# Patient Record
Sex: FEMALE | Race: WHITE | NOT HISPANIC OR LATINO | Employment: FULL TIME | URBAN - METROPOLITAN AREA
[De-identification: names, ages, dates, MRNs, and addresses within clinical notes are randomized per-mention and may not be internally consistent; named-entity substitution may affect disease eponyms.]

---

## 2018-06-21 ENCOUNTER — OFFICE VISIT CONVERTED (OUTPATIENT)
Dept: ONCOLOGY | Facility: HOSPITAL | Age: 33
End: 2018-06-21
Attending: NURSE PRACTITIONER

## 2018-07-03 ENCOUNTER — CONVERSION ENCOUNTER (OUTPATIENT)
Dept: GENERAL RADIOLOGY | Facility: HOSPITAL | Age: 33
End: 2018-07-03

## 2018-08-22 ENCOUNTER — OFFICE VISIT CONVERTED (OUTPATIENT)
Dept: GASTROENTEROLOGY | Facility: CLINIC | Age: 33
End: 2018-08-22
Attending: PHYSICIAN ASSISTANT

## 2018-08-22 ENCOUNTER — CONVERSION ENCOUNTER (OUTPATIENT)
Dept: GASTROENTEROLOGY | Facility: CLINIC | Age: 33
End: 2018-08-22

## 2018-10-29 ENCOUNTER — OFFICE VISIT CONVERTED (OUTPATIENT)
Dept: ONCOLOGY | Facility: HOSPITAL | Age: 33
End: 2018-10-29
Attending: INTERNAL MEDICINE

## 2019-02-06 ENCOUNTER — OFFICE VISIT CONVERTED (OUTPATIENT)
Dept: ONCOLOGY | Facility: HOSPITAL | Age: 34
End: 2019-02-06
Attending: INTERNAL MEDICINE

## 2019-02-06 ENCOUNTER — HOSPITAL ENCOUNTER (OUTPATIENT)
Dept: ONCOLOGY | Facility: HOSPITAL | Age: 34
Discharge: HOME | End: 2019-02-06
Attending: INTERNAL MEDICINE

## 2019-04-23 ENCOUNTER — HOSPITAL ENCOUNTER (OUTPATIENT)
Dept: OTHER | Facility: HOSPITAL | Age: 34
Discharge: HOME | End: 2019-04-23
Attending: INTERNAL MEDICINE

## 2019-04-23 LAB
ALBUMIN SERPL-MCNC: 4.4 G/DL (ref 3.5–5)
ALBUMIN/GLOB SERPL: 1.9 {RATIO} (ref 1.4–2.6)
ALP SERPL-CCNC: 48 U/L (ref 42–98)
ALT SERPL-CCNC: 14 U/L (ref 10–40)
ANION GAP SERPL CALC-SCNC: 15 MMOL/L (ref 8–19)
AST SERPL-CCNC: 13 U/L (ref 15–50)
BASOPHILS # BLD AUTO: 0.02 10*3/UL (ref 0–0.2)
BASOPHILS NFR BLD AUTO: 0.6 % (ref 0–3)
BILIRUB SERPL-MCNC: 0.5 MG/DL (ref 0.2–1.3)
BUN SERPL-MCNC: 8 MG/DL (ref 5–25)
BUN/CREAT SERPL: 10 {RATIO} (ref 6–20)
CALCIUM SERPL-MCNC: 8.6 MG/DL (ref 8.7–10.4)
CHLORIDE SERPL-SCNC: 104 MMOL/L (ref 99–111)
CONV ABS IMM GRAN: 0 10*3/UL (ref 0–0.2)
CONV CO2: 25 MMOL/L (ref 22–32)
CONV IMMATURE GRAN: 0 % (ref 0–1.8)
CONV TOTAL PROTEIN: 6.7 G/DL (ref 6.3–8.2)
CREAT UR-MCNC: 0.84 MG/DL (ref 0.5–0.9)
DEPRECATED RDW RBC AUTO: 45.9 FL (ref 36.4–46.3)
EOSINOPHIL # BLD AUTO: 0.05 10*3/UL (ref 0–0.7)
EOSINOPHIL # BLD AUTO: 1.6 % (ref 0–7)
ERYTHROCYTE [DISTWIDTH] IN BLOOD BY AUTOMATED COUNT: 12.8 % (ref 11.7–14.4)
GFR SERPLBLD BASED ON 1.73 SQ M-ARVRAT: >60 ML/MIN/{1.73_M2}
GLOBULIN UR ELPH-MCNC: 2.3 G/DL (ref 2–3.5)
GLUCOSE SERPL-MCNC: 98 MG/DL (ref 65–99)
HBA1C MFR BLD: 13.4 G/DL (ref 12–16)
HCT VFR BLD AUTO: 39.7 % (ref 37–47)
LDH SERPL-CCNC: 189 U/L (ref 120–240)
LYMPHOCYTES # BLD AUTO: 1.32 10*3/UL (ref 1–5)
MCH RBC QN AUTO: 33 PG (ref 27–31)
MCHC RBC AUTO-ENTMCNC: 33.8 G/DL (ref 33–37)
MCV RBC AUTO: 97.8 FL (ref 81–99)
MONOCYTES # BLD AUTO: 0.16 10*3/UL (ref 0.2–1.2)
MONOCYTES NFR BLD AUTO: 5.2 % (ref 3–10)
NEUTROPHILS # BLD AUTO: 1.55 10*3/UL (ref 2–8)
NEUTROPHILS NFR BLD AUTO: 50 % (ref 30–85)
NRBC CBCN: 0 % (ref 0–0.7)
OSMOLALITY SERPL CALC.SUM OF ELEC: 288 MOSM/KG (ref 273–304)
PLATELET # BLD AUTO: 217 10*3/UL (ref 130–400)
PMV BLD AUTO: 9.7 FL (ref 9.4–12.3)
POTASSIUM SERPL-SCNC: 3.8 MMOL/L (ref 3.5–5.3)
RBC # BLD AUTO: 4.06 10*6/UL (ref 4.2–5.4)
SODIUM SERPL-SCNC: 140 MMOL/L (ref 135–147)
VARIANT LYMPHS NFR BLD MANUAL: 42.6 % (ref 20–45)
WBC # BLD AUTO: 3.1 10*3/UL (ref 4.8–10.8)

## 2019-04-30 LAB
CONV BCR-ABL1  SOURCE: NORMAL
CONV BCR-ABL1 T(9;22) QUAL BY RT-PCR: NOT DETECTED

## 2019-05-01 ENCOUNTER — HOSPITAL ENCOUNTER (OUTPATIENT)
Dept: ONCOLOGY | Facility: HOSPITAL | Age: 34
Discharge: HOME | End: 2019-05-01
Attending: INTERNAL MEDICINE

## 2019-05-01 ENCOUNTER — OFFICE VISIT CONVERTED (OUTPATIENT)
Dept: ONCOLOGY | Facility: HOSPITAL | Age: 34
End: 2019-05-01
Attending: INTERNAL MEDICINE

## 2019-07-26 ENCOUNTER — HOSPITAL ENCOUNTER (OUTPATIENT)
Dept: OTHER | Facility: HOSPITAL | Age: 34
Discharge: HOME | End: 2019-07-26
Attending: NURSE PRACTITIONER

## 2019-07-26 LAB
ALBUMIN SERPL-MCNC: 4.6 G/DL (ref 3.5–5)
ALBUMIN/GLOB SERPL: 2.1 {RATIO} (ref 1.4–2.6)
ALP SERPL-CCNC: 50 U/L (ref 42–98)
ALT SERPL-CCNC: 12 U/L (ref 10–40)
ANION GAP SERPL CALC-SCNC: 15 MMOL/L (ref 8–19)
AST SERPL-CCNC: 12 U/L (ref 15–50)
BASOPHILS # BLD AUTO: 0.02 10*3/UL (ref 0–0.2)
BASOPHILS NFR BLD AUTO: 0.4 % (ref 0–3)
BILIRUB SERPL-MCNC: 0.69 MG/DL (ref 0.2–1.3)
BUN SERPL-MCNC: 9 MG/DL (ref 5–25)
BUN/CREAT SERPL: 12 {RATIO} (ref 6–20)
CALCIUM SERPL-MCNC: 9.1 MG/DL (ref 8.7–10.4)
CHLORIDE SERPL-SCNC: 105 MMOL/L (ref 99–111)
CONV ABS IMM GRAN: 0.01 10*3/UL (ref 0–0.2)
CONV CO2: 25 MMOL/L (ref 22–32)
CONV IMMATURE GRAN: 0.2 % (ref 0–1.8)
CONV TOTAL PROTEIN: 6.8 G/DL (ref 6.3–8.2)
CREAT UR-MCNC: 0.78 MG/DL (ref 0.5–0.9)
DEPRECATED RDW RBC AUTO: 50.2 FL (ref 36.4–46.3)
EOSINOPHIL # BLD AUTO: 0.04 10*3/UL (ref 0–0.7)
EOSINOPHIL # BLD AUTO: 0.8 % (ref 0–7)
ERYTHROCYTE [DISTWIDTH] IN BLOOD BY AUTOMATED COUNT: 13.5 % (ref 11.7–14.4)
GFR SERPLBLD BASED ON 1.73 SQ M-ARVRAT: >60 ML/MIN/{1.73_M2}
GLOBULIN UR ELPH-MCNC: 2.2 G/DL (ref 2–3.5)
GLUCOSE SERPL-MCNC: 99 MG/DL (ref 65–99)
HBA1C MFR BLD: 13.8 G/DL (ref 12–16)
HCT VFR BLD AUTO: 41.2 % (ref 37–47)
LDH SERPL-CCNC: 202 U/L (ref 120–240)
LYMPHOCYTES # BLD AUTO: 1.51 10*3/UL (ref 1–5)
MCH RBC QN AUTO: 33.8 PG (ref 27–31)
MCHC RBC AUTO-ENTMCNC: 33.5 G/DL (ref 33–37)
MCV RBC AUTO: 101 FL (ref 81–99)
MONOCYTES # BLD AUTO: 0.33 10*3/UL (ref 0.2–1.2)
MONOCYTES NFR BLD AUTO: 6.9 % (ref 3–10)
NEUTROPHILS # BLD AUTO: 2.86 10*3/UL (ref 2–8)
NEUTROPHILS NFR BLD AUTO: 60 % (ref 30–85)
NRBC CBCN: 0 % (ref 0–0.7)
OSMOLALITY SERPL CALC.SUM OF ELEC: 291 MOSM/KG (ref 273–304)
PLATELET # BLD AUTO: 224 10*3/UL (ref 130–400)
PMV BLD AUTO: 9.5 FL (ref 9.4–12.3)
POTASSIUM SERPL-SCNC: 4.3 MMOL/L (ref 3.5–5.3)
RBC # BLD AUTO: 4.08 10*6/UL (ref 4.2–5.4)
SODIUM SERPL-SCNC: 141 MMOL/L (ref 135–147)
VARIANT LYMPHS NFR BLD MANUAL: 31.7 % (ref 20–45)
WBC # BLD AUTO: 4.77 10*3/UL (ref 4.8–10.8)

## 2019-08-06 LAB
CONV BCR-ABL1  SOURCE: ABNORMAL
CONV BCR-ABL1  SOURCE: NORMAL
CONV BCR-ABL1 T(9;22) QUAL BY RT-PCR: ABNORMAL
CONV BCR-ABL1, INTERNATIONAL SCALE (PERCENT): 0.07 %
CONV EER BCR-ABL1, MAJOR (P210): NORMAL
P210 BCR-ABL: DETECTED

## 2019-08-07 ENCOUNTER — HOSPITAL ENCOUNTER (OUTPATIENT)
Dept: ONCOLOGY | Facility: HOSPITAL | Age: 34
Discharge: HOME | End: 2019-08-07
Attending: INTERNAL MEDICINE

## 2019-08-07 ENCOUNTER — OFFICE VISIT CONVERTED (OUTPATIENT)
Dept: ONCOLOGY | Facility: HOSPITAL | Age: 34
End: 2019-08-07
Attending: INTERNAL MEDICINE

## 2019-11-19 ENCOUNTER — HOSPITAL ENCOUNTER (OUTPATIENT)
Dept: OTHER | Facility: HOSPITAL | Age: 34
Discharge: HOME | End: 2019-11-19
Attending: NURSE PRACTITIONER

## 2019-11-19 LAB
ALBUMIN SERPL-MCNC: 4.6 G/DL (ref 3.5–5)
ALBUMIN/GLOB SERPL: 1.8 {RATIO} (ref 1.4–2.6)
ALP SERPL-CCNC: 56 U/L (ref 42–98)
ALT SERPL-CCNC: 13 U/L (ref 10–40)
ANION GAP SERPL CALC-SCNC: 21 MMOL/L (ref 8–19)
AST SERPL-CCNC: 13 U/L (ref 15–50)
BASOPHILS # BLD AUTO: 0.02 10*3/UL (ref 0–0.2)
BASOPHILS NFR BLD AUTO: 0.5 % (ref 0–3)
BILIRUB SERPL-MCNC: 0.46 MG/DL (ref 0.2–1.3)
BUN SERPL-MCNC: 9 MG/DL (ref 5–25)
BUN/CREAT SERPL: 11 {RATIO} (ref 6–20)
CALCIUM SERPL-MCNC: 9.3 MG/DL (ref 8.7–10.4)
CHLORIDE SERPL-SCNC: 102 MMOL/L (ref 99–111)
CONV ABS IMM GRAN: 0 10*3/UL (ref 0–0.2)
CONV CO2: 22 MMOL/L (ref 22–32)
CONV IMMATURE GRAN: 0 % (ref 0–1.8)
CONV TOTAL PROTEIN: 7.1 G/DL (ref 6.3–8.2)
CREAT UR-MCNC: 0.84 MG/DL (ref 0.5–0.9)
DEPRECATED RDW RBC AUTO: 47 FL (ref 36.4–46.3)
EOSINOPHIL # BLD AUTO: 0.03 10*3/UL (ref 0–0.7)
EOSINOPHIL # BLD AUTO: 0.8 % (ref 0–7)
ERYTHROCYTE [DISTWIDTH] IN BLOOD BY AUTOMATED COUNT: 12.9 % (ref 11.7–14.4)
GFR SERPLBLD BASED ON 1.73 SQ M-ARVRAT: >60 ML/MIN/{1.73_M2}
GLOBULIN UR ELPH-MCNC: 2.5 G/DL (ref 2–3.5)
GLUCOSE SERPL-MCNC: 97 MG/DL (ref 65–99)
HCT VFR BLD AUTO: 42.5 % (ref 37–47)
HGB BLD-MCNC: 13.8 G/DL (ref 12–16)
LYMPHOCYTES # BLD AUTO: 1.54 10*3/UL (ref 1–5)
LYMPHOCYTES NFR BLD AUTO: 39.2 % (ref 20–45)
MCH RBC QN AUTO: 32.4 PG (ref 27–31)
MCHC RBC AUTO-ENTMCNC: 32.5 G/DL (ref 33–37)
MCV RBC AUTO: 99.8 FL (ref 81–99)
MONOCYTES # BLD AUTO: 0.33 10*3/UL (ref 0.2–1.2)
MONOCYTES NFR BLD AUTO: 8.4 % (ref 3–10)
NEUTROPHILS # BLD AUTO: 2.01 10*3/UL (ref 2–8)
NEUTROPHILS NFR BLD AUTO: 51.1 % (ref 30–85)
NRBC CBCN: 0 % (ref 0–0.7)
OSMOLALITY SERPL CALC.SUM OF ELEC: 291 MOSM/KG (ref 273–304)
PLATELET # BLD AUTO: 241 10*3/UL (ref 130–400)
PMV BLD AUTO: 9.5 FL (ref 9.4–12.3)
POTASSIUM SERPL-SCNC: 4 MMOL/L (ref 3.5–5.3)
RBC # BLD AUTO: 4.26 10*6/UL (ref 4.2–5.4)
SODIUM SERPL-SCNC: 141 MMOL/L (ref 135–147)
WBC # BLD AUTO: 3.93 10*3/UL (ref 4.8–10.8)

## 2019-11-24 LAB
CONV BCR-ABL1  SOURCE: ABNORMAL
CONV BCR-ABL1 T(9;22) QUAL BY RT-PCR: ABNORMAL

## 2019-11-26 LAB
CONV BCR-ABL1  SOURCE: NORMAL
CONV BCR-ABL1, INTERNATIONAL SCALE (PERCENT): 0.03 %
CONV EER BCR-ABL1, MAJOR (P210): NORMAL
P210 BCR-ABL: DETECTED

## 2019-12-31 ENCOUNTER — OFFICE VISIT CONVERTED (OUTPATIENT)
Dept: ONCOLOGY | Facility: HOSPITAL | Age: 34
End: 2019-12-31
Attending: INTERNAL MEDICINE

## 2019-12-31 ENCOUNTER — HOSPITAL ENCOUNTER (OUTPATIENT)
Dept: ONCOLOGY | Facility: HOSPITAL | Age: 34
Discharge: HOME | End: 2019-12-31
Attending: INTERNAL MEDICINE

## 2020-04-27 ENCOUNTER — HOSPITAL ENCOUNTER (OUTPATIENT)
Dept: URGENT CARE | Facility: CLINIC | Age: 35
Discharge: HOME | End: 2020-04-27

## 2020-06-24 ENCOUNTER — HOSPITAL ENCOUNTER (OUTPATIENT)
Dept: OTHER | Facility: HOSPITAL | Age: 35
Discharge: HOME | End: 2020-06-24
Attending: INTERNAL MEDICINE

## 2020-06-24 LAB
ALBUMIN SERPL-MCNC: 4.5 G/DL (ref 3.5–5)
ALBUMIN/GLOB SERPL: 2.3 {RATIO} (ref 1.4–2.6)
ALP SERPL-CCNC: 45 U/L (ref 42–98)
ALT SERPL-CCNC: 10 U/L (ref 10–40)
ANION GAP SERPL CALC-SCNC: 15 MMOL/L (ref 8–19)
AST SERPL-CCNC: 13 U/L (ref 15–50)
BASOPHILS # BLD AUTO: 0.03 10*3/UL (ref 0–0.2)
BASOPHILS NFR BLD AUTO: 0.8 % (ref 0–3)
BILIRUB SERPL-MCNC: 0.49 MG/DL (ref 0.2–1.3)
BUN SERPL-MCNC: 7 MG/DL (ref 5–25)
BUN/CREAT SERPL: 9 {RATIO} (ref 6–20)
CALCIUM SERPL-MCNC: 9 MG/DL (ref 8.7–10.4)
CHLORIDE SERPL-SCNC: 104 MMOL/L (ref 99–111)
CONV ABS IMM GRAN: 0.01 10*3/UL (ref 0–0.2)
CONV CO2: 22 MMOL/L (ref 22–32)
CONV IMMATURE GRAN: 0.3 % (ref 0–1.8)
CONV TOTAL PROTEIN: 6.5 G/DL (ref 6.3–8.2)
CREAT UR-MCNC: 0.79 MG/DL (ref 0.5–0.9)
DEPRECATED RDW RBC AUTO: 43.2 FL (ref 36.4–46.3)
EOSINOPHIL # BLD AUTO: 0.04 10*3/UL (ref 0–0.7)
EOSINOPHIL # BLD AUTO: 1 % (ref 0–7)
ERYTHROCYTE [DISTWIDTH] IN BLOOD BY AUTOMATED COUNT: 11.9 % (ref 11.7–14.4)
GFR SERPLBLD BASED ON 1.73 SQ M-ARVRAT: >60 ML/MIN/{1.73_M2}
GLOBULIN UR ELPH-MCNC: 2 G/DL (ref 2–3.5)
GLUCOSE SERPL-MCNC: 96 MG/DL (ref 65–99)
HCT VFR BLD AUTO: 40.3 % (ref 37–47)
HGB BLD-MCNC: 13.4 G/DL (ref 12–16)
LYMPHOCYTES # BLD AUTO: 1.46 10*3/UL (ref 1–5)
LYMPHOCYTES NFR BLD AUTO: 37 % (ref 20–45)
MCH RBC QN AUTO: 32.4 PG (ref 27–31)
MCHC RBC AUTO-ENTMCNC: 33.3 G/DL (ref 33–37)
MCV RBC AUTO: 97.3 FL (ref 81–99)
MONOCYTES # BLD AUTO: 0.27 10*3/UL (ref 0.2–1.2)
MONOCYTES NFR BLD AUTO: 6.8 % (ref 3–10)
NEUTROPHILS # BLD AUTO: 2.14 10*3/UL (ref 2–8)
NEUTROPHILS NFR BLD AUTO: 54.1 % (ref 30–85)
NRBC CBCN: 0 % (ref 0–0.7)
OSMOLALITY SERPL CALC.SUM OF ELEC: 282 MOSM/KG (ref 273–304)
PLATELET # BLD AUTO: 247 10*3/UL (ref 130–400)
PMV BLD AUTO: 9.6 FL (ref 9.4–12.3)
POTASSIUM SERPL-SCNC: 4.2 MMOL/L (ref 3.5–5.3)
RBC # BLD AUTO: 4.14 10*6/UL (ref 4.2–5.4)
SODIUM SERPL-SCNC: 137 MMOL/L (ref 135–147)
WBC # BLD AUTO: 3.95 10*3/UL (ref 4.8–10.8)

## 2020-06-30 ENCOUNTER — HOSPITAL ENCOUNTER (OUTPATIENT)
Dept: ONCOLOGY | Facility: HOSPITAL | Age: 35
Discharge: HOME | End: 2020-06-30
Attending: INTERNAL MEDICINE

## 2020-06-30 ENCOUNTER — OFFICE VISIT CONVERTED (OUTPATIENT)
Dept: ONCOLOGY | Facility: HOSPITAL | Age: 35
End: 2020-06-30
Attending: INTERNAL MEDICINE

## 2020-06-30 LAB
CONV BCR-ABL1  SOURCE: ABNORMAL
CONV BCR-ABL1 T(9;22) QUAL BY RT-PCR: ABNORMAL

## 2020-07-01 LAB
CONV BCR-ABL1  SOURCE: NORMAL
CONV BCR-ABL1, INTERNATIONAL SCALE (PERCENT): 0.06 %
CONV EER BCR-ABL1, MAJOR (P210): NORMAL
P210 BCR-ABL: DETECTED

## 2020-09-29 ENCOUNTER — HOSPITAL ENCOUNTER (OUTPATIENT)
Dept: MAMMOGRAPHY | Facility: HOSPITAL | Age: 35
Discharge: HOME | End: 2020-09-29
Attending: OBSTETRICS & GYNECOLOGY

## 2021-01-07 ENCOUNTER — HOSPITAL ENCOUNTER (OUTPATIENT)
Dept: OTHER | Facility: HOSPITAL | Age: 36
Discharge: HOME | End: 2021-01-07
Attending: INTERNAL MEDICINE

## 2021-01-07 LAB
ALBUMIN SERPL-MCNC: 4.4 G/DL (ref 3.5–5)
ALBUMIN/GLOB SERPL: 1.9 {RATIO} (ref 1.4–2.6)
ALP SERPL-CCNC: 52 U/L (ref 42–98)
ALT SERPL-CCNC: 13 U/L (ref 10–40)
ANION GAP SERPL CALC-SCNC: 16 MMOL/L (ref 8–19)
AST SERPL-CCNC: 17 U/L (ref 15–50)
BASOPHILS # BLD AUTO: 0.02 10*3/UL (ref 0–0.2)
BASOPHILS NFR BLD AUTO: 0.5 % (ref 0–3)
BILIRUB SERPL-MCNC: 0.64 MG/DL (ref 0.2–1.3)
BUN SERPL-MCNC: 9 MG/DL (ref 5–25)
BUN/CREAT SERPL: 11 {RATIO} (ref 6–20)
CALCIUM SERPL-MCNC: 9 MG/DL (ref 8.7–10.4)
CHLORIDE SERPL-SCNC: 105 MMOL/L (ref 99–111)
CONV ABS IMM GRAN: 0.01 10*3/UL (ref 0–0.2)
CONV CO2: 23 MMOL/L (ref 22–32)
CONV IMMATURE GRAN: 0.3 % (ref 0–1.8)
CONV TOTAL PROTEIN: 6.7 G/DL (ref 6.3–8.2)
CREAT UR-MCNC: 0.81 MG/DL (ref 0.5–0.9)
DEPRECATED RDW RBC AUTO: 45 FL (ref 36.4–46.3)
EOSINOPHIL # BLD AUTO: 0.03 10*3/UL (ref 0–0.7)
EOSINOPHIL # BLD AUTO: 0.8 % (ref 0–7)
ERYTHROCYTE [DISTWIDTH] IN BLOOD BY AUTOMATED COUNT: 12.7 % (ref 11.7–14.4)
GFR SERPLBLD BASED ON 1.73 SQ M-ARVRAT: >60 ML/MIN/{1.73_M2}
GLOBULIN UR ELPH-MCNC: 2.3 G/DL (ref 2–3.5)
GLUCOSE SERPL-MCNC: 90 MG/DL (ref 65–99)
HCT VFR BLD AUTO: 39.6 % (ref 37–47)
HGB BLD-MCNC: 13.3 G/DL (ref 12–16)
LYMPHOCYTES # BLD AUTO: 1.27 10*3/UL (ref 1–5)
LYMPHOCYTES NFR BLD AUTO: 33.2 % (ref 20–45)
MCH RBC QN AUTO: 32.4 PG (ref 27–31)
MCHC RBC AUTO-ENTMCNC: 33.6 G/DL (ref 33–37)
MCV RBC AUTO: 96.6 FL (ref 81–99)
MONOCYTES # BLD AUTO: 0.27 10*3/UL (ref 0.2–1.2)
MONOCYTES NFR BLD AUTO: 7.1 % (ref 3–10)
NEUTROPHILS # BLD AUTO: 2.22 10*3/UL (ref 2–8)
NEUTROPHILS NFR BLD AUTO: 58.1 % (ref 30–85)
NRBC CBCN: 0 % (ref 0–0.7)
OSMOLALITY SERPL CALC.SUM OF ELEC: 288 MOSM/KG (ref 273–304)
PLATELET # BLD AUTO: 245 10*3/UL (ref 130–400)
PMV BLD AUTO: 9.8 FL (ref 9.4–12.3)
POTASSIUM SERPL-SCNC: 3.8 MMOL/L (ref 3.5–5.3)
RBC # BLD AUTO: 4.1 10*6/UL (ref 4.2–5.4)
SODIUM SERPL-SCNC: 140 MMOL/L (ref 135–147)
WBC # BLD AUTO: 3.82 10*3/UL (ref 4.8–10.8)

## 2021-01-14 ENCOUNTER — OFFICE VISIT CONVERTED (OUTPATIENT)
Dept: ONCOLOGY | Facility: HOSPITAL | Age: 36
End: 2021-01-14
Attending: INTERNAL MEDICINE

## 2021-01-14 ENCOUNTER — HOSPITAL ENCOUNTER (OUTPATIENT)
Dept: ONCOLOGY | Facility: HOSPITAL | Age: 36
Discharge: HOME | End: 2021-01-14
Attending: INTERNAL MEDICINE

## 2021-01-18 ENCOUNTER — OFFICE VISIT CONVERTED (OUTPATIENT)
Dept: ORTHOPEDIC SURGERY | Facility: CLINIC | Age: 36
End: 2021-01-18
Attending: ORTHOPAEDIC SURGERY

## 2021-01-19 LAB
CONV BCR-ABL1  SOURCE: ABNORMAL
CONV BCR-ABL1 T(9;22) QUAL BY RT-PCR: ABNORMAL

## 2021-01-21 LAB
CONV BCR-ABL1  SOURCE: NORMAL
CONV BCR-ABL1, INTERNATIONAL SCALE (PERCENT): 0.04 %
CONV EER BCR-ABL1, MAJOR (P210): NORMAL
P210 BCR-ABL: DETECTED

## 2021-04-13 ENCOUNTER — HOSPITAL ENCOUNTER (OUTPATIENT)
Dept: MRI IMAGING | Facility: HOSPITAL | Age: 36
Discharge: HOME | End: 2021-04-13
Attending: OBSTETRICS & GYNECOLOGY

## 2021-04-22 ENCOUNTER — HOSPITAL ENCOUNTER (OUTPATIENT)
Dept: ULTRASOUND IMAGING | Facility: HOSPITAL | Age: 36
Discharge: HOME | End: 2021-04-22
Attending: OBSTETRICS & GYNECOLOGY

## 2021-04-29 ENCOUNTER — HOSPITAL ENCOUNTER (OUTPATIENT)
Dept: MRI IMAGING | Facility: HOSPITAL | Age: 36
Discharge: HOME | End: 2021-04-29
Attending: OBSTETRICS & GYNECOLOGY

## 2021-05-10 NOTE — H&P
History and Physical      Patient Name: Sailaja Mercer   Patient ID: [id number]   Sex: Female   YOB: 1985    Referring Provider: Pepe Aguilar MD    Visit Date: January 18, 2021    Provider: Eric Devine MD   Location: [facility]   Location Address: [address] [place] [address]  [id number]   Location Phone: [phone]          Chief Complaint  · Left shoulder pain       History Of Present Illness  Sailaja Mercer is a 35 year old /White female who presents today to [facility].      Patient presents today for an evaluation of left shoulder pain. Patient started having pain when doing a bar class. She was doing some lifting with a bar when she started having an onset of left shoulder pain in July 2020. She still is working out now and states she has pain around the AC joint and more posteriorly. She denies pain shooting down her arm or under her arm. She denies any trauma or injury to her left shoulder. Patient uses Ibuprofen periodically for relief.       Past Medical History  Anxiety; Leukemia         Past Surgical History  Appendectomy; Colonoscopy; EGD         Medication List  Gleevec 100 mg oral tablet; ibuprofen 200 mg oral tablet; Lo Loestrin Fe 1 mg-10 mcg (24)/10 mcg (2) oral tablet; Zofran 4 mg oral tablet; Zyrtec 10 mg oral tablet         Allergy List  NO KNOWN DRUG ALLERGIES       Allergies Reconciled  Family Medical History  - No Family History of Colorectal Cancer         Social History  Alcohol (Current some day); Tobacco (Never)         Review of Systems  · Constitutional  o Denies  o : fever, chills, weight loss  · Cardiovascular  o Denies  o : chest pain, shortness of breath  · Gastrointestinal  o Denies  o : liver disease, heartburn, nausea, blood in stools  · Genitourinary  o Denies  o : painful urination, blood in urine  · Integument  o Denies  o : rash, itching  · Neurologic  o Denies  o : headache, weakness, loss of  consciousness  · Musculoskeletal  o Denies  o : painful, swollen joints  · Psychiatric  o Denies  o : drug/alcohol addiction, anxiety, depression      Vitals  Date Time BP Position Site L\R Cuff Size HR RR TEMP (F) WT  HT  BMI kg/m2 BSA m2 O2 Sat FR L/min FiO2 HC       01/18/2021 12:59 PM      80 - R   118lbs 8oz 5'   23.14 1.51 98 %            Physical Examination  · Constitutional  o Appearance  o : well developed, well-nourished, no obvious deformities present  · Head and Face  o Head  o :   § Inspection  § : normocephalic  o Face  o :   § Inspection  § : no facial lesions  · Eyes  o Conjunctivae  o : conjunctivae normal  o Sclerae  o : sclerae white  · Ears, Nose, Mouth and Throat  o Ears  o :   § External Ears  § : appearance within normal limits  § Hearing  § : intact  o Nose  o :   § External Nose  § : appearance normal  · Neck  o Inspection/Palpation  o : normal appearance  o Range of Motion  o : full range of motion  · Respiratory  o Respiratory Effort  o : breathing unlabored  o Inspection of Chest  o : normal appearance  o Auscultation of Lungs  o : no audible wheezing or rales  · Cardiovascular  o Heart  o : regular rate  · Gastrointestinal  o Abdominal Examination  o : soft and non-tender  · Skin and Subcutaneous Tissue  o General Inspection  o : intact, no rashes  · Psychiatric  o General  o : Alert and oriented x3  o Judgement and Insight  o : judgment and insight intact  o Mood and Affect  o : mood normal, affect appropriate  · Left Shoulder  o Inspection  o : Sensation grossly intact. Neurovascular intact. Skin intact. Pulses are pleasant. No swelling, skin discoloration or atrophy. Appearance of her shoulder is normal. Mild crepitus with cross body adduction. Mild impingement testing signs. No instability. Good tone of deltoid, biceps, triceps, wrist extensors, and wrist flexors.   · Injection Note/Aspiration Note  o Site  o : left shoulder   o Procedure  o : Procedure: After educating the patient,  patient gave consent for procedure. After using Chloraprep, the joint space was injected. The patient tolerated the procedure well.   o Medication  o : 80 mg of DepoMedrol with 9cc of 1% Lidocaine  · In Office Procedures  o View  o : AP/LATERAL  o Site  o : Left shoulder   o Indication  o : Left shoulder pain   o Study  o : X-rays ordered, taken in the office, and reviewed today.  o Xray  o : No significant osteoarthritis of the AC joint or glenohumeral joint. No signs of fracture or dislocation.  o Comparative Data  o : No comparative data found          Assessment  · Bursitis of left shoulder     726.10/M75.52  · Left shoulder pain, unspecified chronicity     719.41/M25.512      Plan  · Orders  o Depo-Medrol injection 80mg () - - 01/18/2021   Lot [id number] Exp 01 2022 [employer] Administered by SEVERO Devine MD  o Shoulder Intra-articular Injection without US Guidance [facility] (84692) - - 01/18/2021   Lot [id number] Exp 03 01 2022 [employer] Administered by SEVERO Devine MD  o Scapula (Left) [facility] View (54034-DF) - 719.41/M25.512 - 01/18/2021  · Medications  o Medications have been Reconciled  o Transition of Care or Provider Policy  · Instructions  o Dr. Devine saw and examined the patient and agrees with plan.   o X-rays reviewed by Dr. Devine.  o Reviewed the patient's Past Medical, Social, and Family history as well as the ROS at today's visit, no changes.  o Call or return if worsening symptoms.  o Follow Up in 4 weeks.   o Follow up in 6 weeks.  o This note was transcribed by Lottie Chery. [email or web address]  o Discussed diagnosis and treatment plans with the patient. Patient received a left shoulder steroid injection in the subacromial space and tolerated it well.             Electronically Signed by: Lottie Chery-, Other -Author on January 21, 2021 09:05:24 AM  Electronically Co-signed by: Eric Devine MD -Reviewer on January 24, 2021 07:46:12 PM

## 2021-05-14 VITALS — OXYGEN SATURATION: 98 % | BODY MASS INDEX: 23.26 KG/M2 | HEART RATE: 80 BPM | HEIGHT: 60 IN | WEIGHT: 118.5 LBS

## 2021-05-16 VITALS
HEIGHT: 60 IN | WEIGHT: 118 LBS | RESPIRATION RATE: 16 BRPM | SYSTOLIC BLOOD PRESSURE: 115 MMHG | HEART RATE: 78 BPM | DIASTOLIC BLOOD PRESSURE: 71 MMHG | BODY MASS INDEX: 23.16 KG/M2 | OXYGEN SATURATION: 100 %

## 2021-05-28 VITALS
SYSTOLIC BLOOD PRESSURE: 121 MMHG | OXYGEN SATURATION: 100 % | RESPIRATION RATE: 16 BRPM | TEMPERATURE: 97.1 F | DIASTOLIC BLOOD PRESSURE: 82 MMHG | BODY MASS INDEX: 23.25 KG/M2 | HEART RATE: 103 BPM | WEIGHT: 119.05 LBS

## 2021-05-28 VITALS
DIASTOLIC BLOOD PRESSURE: 75 MMHG | WEIGHT: 119.27 LBS | DIASTOLIC BLOOD PRESSURE: 79 MMHG | BODY MASS INDEX: 22.51 KG/M2 | TEMPERATURE: 98.1 F | SYSTOLIC BLOOD PRESSURE: 111 MMHG | SYSTOLIC BLOOD PRESSURE: 115 MMHG | HEART RATE: 92 BPM | BODY MASS INDEX: 23.16 KG/M2 | RESPIRATION RATE: 4 BRPM | BODY MASS INDEX: 23.29 KG/M2 | WEIGHT: 119.71 LBS | TEMPERATURE: 98.5 F | WEIGHT: 114.64 LBS | BODY MASS INDEX: 23.5 KG/M2 | HEART RATE: 104 BPM | SYSTOLIC BLOOD PRESSURE: 113 MMHG | RESPIRATION RATE: 16 BRPM | BODY MASS INDEX: 23.94 KG/M2 | RESPIRATION RATE: 18 BRPM | OXYGEN SATURATION: 100 % | OXYGEN SATURATION: 100 % | SYSTOLIC BLOOD PRESSURE: 115 MMHG | HEART RATE: 91 BPM | RESPIRATION RATE: 16 BRPM | OXYGEN SATURATION: 100 % | SYSTOLIC BLOOD PRESSURE: 130 MMHG | TEMPERATURE: 98.8 F | WEIGHT: 121.91 LBS | TEMPERATURE: 98.5 F | OXYGEN SATURATION: 100 % | OXYGEN SATURATION: 100 % | WEIGHT: 118.61 LBS | RESPIRATION RATE: 16 BRPM | TEMPERATURE: 98.4 F | DIASTOLIC BLOOD PRESSURE: 70 MMHG | DIASTOLIC BLOOD PRESSURE: 66 MMHG | HEIGHT: 60 IN | BODY MASS INDEX: 22.94 KG/M2 | SYSTOLIC BLOOD PRESSURE: 109 MMHG | HEIGHT: 60 IN | RESPIRATION RATE: 16 BRPM | TEMPERATURE: 98.5 F | HEART RATE: 96 BPM | DIASTOLIC BLOOD PRESSURE: 62 MMHG | WEIGHT: 116.84 LBS | HEIGHT: 60 IN | HEIGHT: 60 IN | DIASTOLIC BLOOD PRESSURE: 74 MMHG | HEART RATE: 84 BPM | OXYGEN SATURATION: 100 % | HEART RATE: 94 BPM

## 2021-05-28 VITALS
HEIGHT: 60 IN | DIASTOLIC BLOOD PRESSURE: 55 MMHG | HEART RATE: 90 BPM | RESPIRATION RATE: 16 BRPM | WEIGHT: 118.61 LBS | TEMPERATURE: 98.8 F | BODY MASS INDEX: 23.29 KG/M2 | OXYGEN SATURATION: 100 % | SYSTOLIC BLOOD PRESSURE: 110 MMHG

## 2021-05-28 NOTE — PROGRESS NOTES
Patient: SAILAJA GARCIA     Acct: [id number]     Report: #[id number]  UNIT #: [id number]     : 1985    Encounter Date:2019  PRIMARY CARE: Pepe Aguilar  ***Signed***  --------------------------------------------------------------------------------------------------------------------  NURSE INTAKE      Visit Type      Established Patient Visit            Chief Complaint      cml            Referring Provider/Copies To      Provider Not Found in Lookup:  CANT REMEMBER      Primary Care Provider:  Pepe Aguilar            History and Present Illness      Past Oncology Illness History      Ms. Sailaja Garcia presents 15, transferring care to [facility], for chronic     myeloid leukemia, diagnosed in 2012.  At that time she had a white count of    38,500 with a platelet count of 835,000.  At that time she also had a     basophilia, eosinophilia.  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  She was then referred to Dr. Pizano in [place], [place]     who has been following her since that time.  Per the patient, she quickly     achieved a complete hematologic response and on our laboratory examination it     appears this occurred roughly in September or 2012. She also states that    she has obtained a major molecular response with a BCR-ABL PCR less than 0.1%.      She was started on Imatinib targeted treatment at a dose of 400 mg daily and     unfortunately had significant side effects including swelling, fatigue and d    ifficulty with sleep.  Her dose was decreased to 200 mg daily and her last BCR-    ABL fusin analysis performed on 2014 unfortunately showed a BCR-ABL ratio     of 0.070% which previously was not detected on 2013.            HPI - Oncology Interim      F/u CML-currently on Gleevec therapy and tolerating w/minimal issues.  Lab work     19 showed BCR ABL 0.0678%,  previously 0.1422 11/6/18 with stable CBC and     CMP noted. Pt reports feeling well. She goes back to work () [employer].  Has been having some neck discomfort and upper bilateral pectoral     neuropathy; however, that resolved.  Her neck is still sore.  Full ROM noted.            Cancer Details            CML-dx June 2012            Clinical Staging      CHRONIC PHASE CML            Treatments      Chemotherapy      Began Gleevec Summer 2013            Clinical Trial Participant      No            ECOG Performance Status      0            Most Recent Lab Findings      Laboratory Tests      7/26/19 09:37            PAST, FAMILY   Past Medical History      Past Medical History:  None      Other PMH:        CML      Other Hematology History:        CML            Past Surgical History      Appendectomy            Family History      Family History:  Breast Cancer (MOTHER)            MOTHER AND FATHER LIVING NO CHRONIC ILLNESSES            Social History      Marital Status:        Lives independently:  Yes      Number of Children:  2      Occupation:  TEACHER            Tobacco Use      Tobacco status:  Never smoker            Alcohol Use      Alcohol intake:  None            Substance Use      Substance use:  Denies use            REVIEW OF SYSTEMS      General:  Denies: Fatigue      Eye:  Denies: Corrective Lenses      ENT:  Denies: Headache      Cardiovascular:  Denies: Chest Pain      Respiratory:  Denies: Productive Cough      Gastrointestinal:  Denies: Constipation      Genitourinary (female):  Denies: Decrease Urine Stream      Musculoskeletal:  Denies: Leg Cramps      Integumentary:  Denies: Hair Changes      Neurologic:  Denies: Dizziness            VITAL SIGNS AND SCORES      Vitals      Height 5 ft  / 152.4 cm      Weight 114 lbs 10.227 oz / 52 kg      BSA 1.47 m2      BMI 22.4 kg/m2      Temperature 98.8 F / 37.11 C - Temporal      Pulse 96      Respirations 16      Blood Pressure  109/66 Sitting, Right Arm      Pulse Oximetry 100%, RM AIR            Pain Score      Pain Scale Used:  Numerical      Pain Intensity:  3 (NECK)            Fatigue Score      Fatigue (0-10 scale):  0 (none)            EXAM      General Appearance:  Positive for: Alert, Oriented x3, Cooperative;          Negative for: Acute Distress      Neck:  Positive for: Supple;          Negative for: JVD, Masses      Respiratory:  Positive for: CTAB, Normal Respiratory Effort      Abdomen/Gastro:  Positive for: Normal Active Bowel Sounds, Soft;          Negative for: Distention, Hepatosplenomegaly, Tenderness      Cardiovascular:  Positive for: RRR;          Negative for: Gallop, Murmur, Peripheral Edema, Rub      Psychiatric:  Positive for: Appropriate Affect, Intact Judgement      Lymphatic:  Negative for: Cervical, Infraclavicular, Supraclavicular            PREVENTION      Hx Influenza Vaccination:  No      Influenza Vaccine Declined:  Yes      2 or More Falls Past Year?:  No      Fall Past Year with Injury?:  No      Hx Pneumococcal Vaccination:  No      Encouraged to follow-up with:  PCP regarding preventative exams.      Chart initiated by      CHESTER GOTTI MA            ALLERGY/MEDS      Allergies      Coded Allergies:             NICKEL (Verified  Allergy, Severe, 8/7/19)            Medications      Last Reconciled on 8/7/19 14:52 by MYRON LIM      Ondansetron Hcl (ONDANSETRON HCL) 8 Mg Tablet      8 MG PO Q8H PRN for NAUSEA, #60 TAB 6 Refills         Prov: TRISTEN KIM         7/23/19       Imatinib Mesylate (Gleevec) 400 Mg Tablet      400 MG PO QDAY for 30 Days, #30 TAB         Reported         5/1/19       Norgestrel/Ethinyl Estradiol (Low-Ogestrel 28) 1 Each Tablet      1 TAB PO QDAY, #28 TAB         Reported         9/13/16       Cetirizine Hcl (ZyrTEC) 5 Mg Tablet      10 MG PO QDAY for 30 Days, #60 TAB         Reported         7/12/16      Medications Reviewed:  No Changes made to meds             IMPRESSION/PLAN      Diagnosis      CML (chronic myeloid leukemia) - C92.10            Notes      CML.  Chronic phase.  Patient is doing great on Gleevec 400 mill grams daily.      Blood counts are normal.  BCR/ABL shows major molecular response with     international scale ratio 0.06.  Continue Gleevec.  RTC 3 months for OV with     labs prior      New Diagnostics      * BCR-ABL1 Qual refl BCR QN BCAB, 3 Months         Dx: CML (chronic myeloid leukemia) - C92.10      * CMP Comp Metabolic Panel, 3 Months         Dx: CML (chronic myeloid leukemia) - C92.10      * CBC With Auto Diff, 3 Months         Dx: CML (chronic myeloid leukemia) - C92.10            Patient Education            Chronic Neck Pain      Patient Education Provided:  Yes                 Disclaimer: Converted document may not contain table formatting or lab diagrams. Please see [employer] for the authenticated document.

## 2021-05-28 NOTE — PROGRESS NOTES
Patient: SAILAJA GARCIA     Acct: [id number]     Report: #[id number]  UNIT #: [id number]     : 1985    Encounter Date:2019  PRIMARY CARE: Pepe Aguilar  ***Signed***  --------------------------------------------------------------------------------------------------------------------  NURSE INTAKE      Visit Type      Established Patient Visit            Chief Complaint      CML HERE FOR 3 MONTH F/U            Referring Provider/Copies To      Provider Not Found in Lookup:  CANT REMEMBER      Primary Care Provider:  Pepe Aguilar            History and Present Illness      Past Oncology Illness History      Ms. Sailaja Garcia presents 15, transferring care to [facility], for chronic     myeloid leukemia, diagnosed in 2012.  At that time she had a white count of    38,500 with a platelet count of 835,000.  At that time she also had a     basophilia, eosinophilia.  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  She was then referred to Dr. Pizano in [place], [place]     who has been following her since that time.  Per the patient, she quickly     achieved a complete hematologic response and on our laboratory examination it     appears this occurred roughly in September or 2012. She also states that    she has obtained a major molecular response with a BCR-ABL PCR less than 0.1%.      She was started on Imatinib targeted treatment at a dose of 400 mg daily and     unfortunately had significant side effects including swelling, fatigue and     difficulty with sleep.  Her dose was decreased to 200 mg daily and her last BCR-    ABL fusin analysis performed on 2014 unfortunately showed a BCR-ABL ratio     of 0.070% which previously was not detected on 2013.            HPI - Oncology Interim      F/U CML--taking Gleevec 400mg qd w/minimal issues--does experience nausea;     however, Zofran  manages-takes together each day.  She does have some fatigue;     however, she is a  and a mother.  She denies n/v or diarrhea     at this time.  Lab work from 4/23/18 BCR ABL undetectable.  No fever, SOA or     distress at this time.            Cancer Details            CML-dx June 2012            Clinical Staging      CHRONIC PHASE CML            Treatments      Chemotherapy      Began Gleevec Summer 2013            Clinical Trial Participant      No            ECOG Performance Status      0            Most Recent Lab Findings      Laboratory Tests      4/23/19 07:40            PAST, FAMILY   Past Medical History      Other PMH      NONE      Other Hematology/Oncology      CML            Past Surgical History      Appendectomy            Family History      Family History:  Breast Cancer (MOTHER)            Social History      Marital Status:        Lives independently:  Yes      Number of Children:  2      Occupation:  TEACHER            REVIEW OF SYSTEMS      General:  Denies: Appetite Change, Fatigue, Fever, Night Sweats, Weight Gain,     Weight Loss      Eye:  Denies: Blurred Vision, Corrective Lenses, Diplopia, Eye Irritation, Eye     Pain, Eye Redness, Spots in Vision, Vision Loss      ENT:  Denies: Headache, Hearing Loss, Hoarseness, Seizures, Sinus Congestion, S    ore Throat      Cardiovascular:  Denies: Chest Pain, Edema Ankles, Edema Legs, Irregular     Heartbeat, Palpitations      Respiratory:  Denies: Coughing Blood, Productive Cough, Shortness of Air,     Wheezing      Gastrointestinal:  Admits: Nausea;          Denies: Bloody Stools, Constipation, Diarrhea, Frequent Heartburn, Problem     Swallowing, Tarry Stools, Unable to Control Bowels, Vomiting      Genitourinary (female):  Denies: Blood in Urine, Decrease Urine Stream, Frequent    Urination, Incontinence, Painful Urination      Musculoskeletal:  Denies: Back Pain, Leg Cramps, Muscle Pain, Muscle Weakness,     Painful  Joints, Swollen Joints      Integumentary:  Denies: Bleeds Easily, Bruises Easily, Hair Changes, Jaundice,     Lesions, Mole Changes, Nail Changes, Pigment Changes, Rash, Skin Discoloration      Neurologic:  Denies: Dizziness, Fainting, Numbness\Tingling, Paralysis, Seizures      Psychiatric:  Denies: Anxiety, Confused, Depression, Disoriented, Memory Loss      Endocrine:  Denies: Cold Intolerance, Diabetes, Excessive Sweating, Excessive     Thirst, Excessive Urination, Heat Intolerance, Flushing, Hyperthyroidism,     Hypothyroidism      Hematologic/Lymphatic:  Denies: Bruising, Bleeding, Enlarged Lymph Nodes,     Recurrent Infections, Transfusions            VITAL SIGNS AND SCORES      Vitals      Height 5 ft  / 152.4 cm      Weight 121 lbs 14.630 oz / 55.3 kg      BSA 1.51 m2      BMI 23.8 kg/m2      Temperature 98.5 F / 36.94 C - Temporal      Pulse 92      Respirations 16      Blood Pressure 111/70 Sitting, Right Arm      Pulse Oximetry 100%, ROOM AIR            Pain Score      Pain Scale Used:  Numerical      Pain Intensity:  0            Fatigue Score      Fatigue (0-10 scale):  0 (none)            EXAM      General Appearance:  Positive for: Alert, Oriented x3, Cooperative;          Negative for: Acute Distress      Neck:  Positive for: Supple;          Negative for: JVD, Masses      Respiratory:  Positive for: CTAB, Normal Respiratory Effort      Abdomen/Gastro:  Positive for: Normal Active Bowel Sounds, Soft;          Negative for: Distention, Hepatosplenomegaly, Tenderness      Cardiovascular:  Positive for: RRR;          Negative for: Gallop, Murmur, Peripheral Edema, Rub      Psychiatric:  Positive for: Appropriate Affect, Intact Judgement            PREVENTION      Influenza Vaccine Declined:  Yes      2 or More Falls Past Year?:  No      Fall Past Year with Injury?:  No      Encouraged to follow-up with:  PCP regarding preventative exams.      Chart initiated by      NAOMY RUFFIN             ALLERGY/MEDS      Allergies      Coded Allergies:             NICKEL (Verified  Allergy, Severe, 2/6/19)            Medications      Last Reconciled on 5/1/19 15:45 by MYRON LIM      Imatinib Mesylate (Gleevec) 400 Mg Tablet      400 MG PO QDAY for 30 Days, #30 TAB         Reported         5/1/19       Ondansetron Hcl (ONDANSETRON HCL) 8 Mg Tablet      8 MG PO Q8H PRN for NAUSEA, #30 TAB 3 Refills         Prov: TRISTEN KIM         10/29/18       Norgestrel/Ethinyl Estradiol (Low-Ogestrel 28) 1 Each Tablet      1 TAB PO QDAY, #28 TAB         Reported         9/13/16       Cetirizine Hcl (ZyrTEC) 5 Mg Tablet      10 MG PO QDAY for 30 Days, #60 TAB         Reported         7/12/16      Medications Reviewed:  No Changes made to meds            IMPRESSION/PLAN      Impression      CML.  Chronic phase.            Diagnosis      CML (chronic myeloid leukemia) - C92.10            Notes      Patient is on Gleevec.  She is doing great.  BCR able is negative.  CBC     demonstrates only very slight leukocytopenia.  No need for dose adjustment.      Continue Gleevec.  Recheck in 3 months      Changed Medications      * Imatinib Mesylate (Gleevec) 400 MG TABLET: 400 MG PO QDAY 30 Days #30         Replaced 100 MG TABLET: 400 MG PO QDAY      New Diagnostics      * BCR-ABL1 Qual refl BCR QN BCAB, 3 Months         Dx: CML (chronic myeloid leukemia) - C92.10      * LDH, 3 Months         Dx: CML (chronic myeloid leukemia) - C92.10      * CMP Comp Metabolic Panel, 3 Months         Dx: CML (chronic myeloid leukemia) - C92.10      * CBC With Auto Diff, 3 Months         Dx: CML (chronic myeloid leukemia) - C92.10            Patient Education      Patient Education Provided:  Yes            Topics Patient Counseled on      Discussed possibility of Gleevec withdrawal after 10yr treatment                 Disclaimer: Converted document may not contain table formatting or lab diagrams. Please see [employer]  [facility] for the authenticated document.

## 2021-05-28 NOTE — PROGRESS NOTES
Patient: IRENA GARCIA     Acct: [id number]     Report: #[id number]  UNIT #: [id number]     : 1985    Encounter Date:2020  PRIMARY CARE: Pepe Aguilar  ***Signed***  --------------------------------------------------------------------------------------------------------------------  NURSE INTAKE      Visit Type      Established Patient Visit            Chief Complaint      CML F/U      Intent of Therapy:  Curative            Referring Provider/Copies To      Primary Care Provider:  Pepe Aguilar      Copies To:   Pepe Aguilar            History and Present Illness      Past Oncology Illness History      chronic myeloid leukemia, diagnosed in 2012.  At that time she had a white     count of 38,500 with a platelet count of 835,000.  At that time she also had a     basophilia, eosinophilia.  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  She was then referred to Dr. Pizano in [place], [place]     who has been following her since that time.  Per the patient, she quickly     achieved a complete hematologic response and on our laboratory examination it     appears this occurred roughly in September or 2012. She also states that    she has obtained a major molecular response with a BCR-ABL PCR less than 0.1%.      She was started on Imatinib targeted treatment at a dose of 400 mg daily and     unfortunately had significant side effects including swelling, fatigue and     difficulty with sleep.  Her dose was decreased to 200 mg daily and her last BCR-    ABL fusin analysis performed on 2014 unfortunately showed a BCR-ABL ratio     of 0.070% which previously was not detected on 2013.            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 She is on gleevec. She is     compliant with the regimen. She reports nausea with the gleevec but zofran is      helpful. She reports good energy and appetite. She denies fever, chills, weight     loss, night sweats, bruising            Cancer Details            CML-dx June 2012            Clinical Staging      CHRONIC PHASE CML            Treatments      Chemotherapy      Began Gleevec Summer 2013            Clinical Trial Participant      No            ECOG Performance Status      0            Most Recent Lab Findings      Laboratory Tests      6/24/20 09:57            PAST, FAMILY   Past Medical History      Past Medical History:  None      Other PMH:        CML      Other Hematology History:        CML            Past Surgical History      Appendectomy            Family History      Family History:  Breast Cancer (MOTHER)            MOTHER AND FATHER LIVING NO CHRONIC ILLNESSES            Social History      Marital Status:        Lives independently:  Yes      Number of Children:  2      Occupation:  TEACHER            Tobacco Use      Tobacco status:  Never smoker            Alcohol Use      Alcohol intake:  None            Substance Use      Substance use:  Denies use            REVIEW OF SYSTEMS      General:  Denies: Fatigue, Fever, Night Sweats, Weight Loss      ENT:  Denies Headache, Denies Hearing Loss      Cardiovascular:  Denies Chest Pain, Denies Palpitations      Respiratory:  Denies: Coughing Blood      Gastrointestinal:  Denies Bloody Stools, Denies Diarrhea      Musculoskeletal:  Denies Back Pain, Denies Muscle Pain      Neurologic:  Denies Numbness\Tingling      Psychiatric:  Denies Anxiety, Denies Depression      Hematologic/Lymphatic:  Denies Bruising, Denies Bleeding            VITAL SIGNS AND SCORES      Vitals      Weight 119 lbs 4.302 oz / 54.1 kg      Temperature 98.1 F / 36.72 C - Temporal      Pulse 84      Respirations 16      Blood Pressure 115/62 Sitting      Pulse Oximetry 100%, RM AIR            Pain Score      Pain Scale Used:  Numerical            Fatigue Score      Fatigue (0-10  scale):  0 (none)            EXAM      General Appearance:  Positive for: Alert, Cooperative;          Negative for: Acute Distress      Neck:  Positive for: Supple;          Negative for: JVD, Masses      Respiratory:  Positive for: CTAB, Normal Respiratory Effort      Abdomen/Gastro:  Positive for: Normal Active Bowel Sounds, Soft;          Negative for: Distention, Hepatosplenomegaly, Tenderness      Cardiovascular:  Positive for: RRR;          Negative for: Gallop, Murmur, Peripheral Edema, Rub      Psychiatric:  Positive for: Appropriate Affect, Intact Judgement      Lymphatic:  Negative for: Cervical, Infraclavicular, Supraclavicular            PREVENTION      Hx Influenza Vaccination:  No      Influenza Vaccine Declined:  Yes      2 or More Falls Past Year?:  No      Fall Past Year with Injury?:  No      Hx Pneumococcal Vaccination:  No      Encouraged to follow-up with:  PCP regarding preventative exams.      Chart initiated by      CHESTER GOTTI MA            ALLERGY/MEDS      Allergies      Coded Allergies:             NICKEL (Verified  Allergy, Severe, 6/30/20)           NO KNOWN DRUG ALLERGIES (Verified  Allergy, Unknown, 6/30/20)            Medications      Last Reconciled on 6/30/20 12:09 by ELVER SHARP      Ondansetron Hcl (ONDANSETRON HCL) 8 Mg Tablet      8 MG PO Q8H PRN for NAUSEA, #60 TAB 6 Refills         Prov: ELVER SHARP         6/30/20       Imatinib Mesylate (Gleevec) 400 Mg Tablet      400 MG PO QDAY for 30 Days, #30 TAB 6 Refills         Prov: ELVER SHARP         4/8/20       Norgestrel/Ethinyl Estradiol (Low-Ogestrel 28) 1 Each Tablet      1 TAB PO QDAY, #28 TAB         Reported         9/13/16       Cetirizine Hcl (zyrTEC) 5 Mg Tablet      10 MG PO QDAY for 30 Days, #60 TAB         Reported         7/12/16      Medications Reviewed:  Changes made to meds            IMPRESSION/PLAN      Diagnosis      CML (chronic myeloid leukemia) - C92.10      Pt is doing well on gleevec. Labs  are good. BCR/ABL is positive on qualititative    PCR, quantitaive level is pending. Cont gleevec. Refill zofran as needed for     nausea. RTC 6 mo for ov and labs.             Notes      Renewed Medications      * ONDANSETRON HCL 8 MG TABLET: 8 MG PO Q8H PRN NAUSEA #60         Dx: CML (chronic myeloid leukemia) - C92.10      Discontinued Medications      * Cephalexin Monohydrate (Keflex) 500 MG CAPSULE: 500 MG PO QID #40      * ACETAMINOPHEN ES (Tylenol Extra Strength) 500 MG TABLET: 500 MG PO Q6H PRN       PAIN OR FEVER #30      New Diagnostics      * CBC With Auto Diff, 6 Months         Dx: CML (chronic myeloid leukemia) - C92.10      * CMP Comp Metabolic Panel, 6 Months         Dx: CML (chronic myeloid leukemia) - C92.10      * BCR-ABL1 Qual refl BCR QN BCAB, 6 Months         Dx: CML (chronic myeloid leukemia) - C92.10            Patient Education      Patient Education Provided:  Yes            Electronically signed by ELVER SHARP  06/30/2020 12:10       Disclaimer: Converted document may not contain table formatting or lab diagrams. Please see [facility] for the authenticated document.

## 2021-05-28 NOTE — PROGRESS NOTES
Patient: IRENA GARCIA     Acct: [id number]     Report: #[id number]  UNIT #: [id number]     : 1985    Encounter Date:2019  PRIMARY CARE: Pepe Aguilar  ***Signed***  --------------------------------------------------------------------------------------------------------------------  NURSE INTAKE      Visit Type      Established Patient Visit            Chief Complaint      CML FOLLOW UP            Referring Provider/Copies To      Primary Care Provider:  Pepe Aguilar      Copies To:   Pepe Aguilar            History and Present Illness      Past Oncology Illness History      chronic myeloid leukemia, diagnosed in 2012.  At that time she had a white     count of 38,500 with a platelet count of 835,000.  At that time she also had a     basophilia, eosinophilia.  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  She was then referred to Dr. Pizano in [place], [place]     who has been following her since that time.  Per the patient, she quickly     achieved a complete hematologic response and on our laboratory examination it     appears this occurred roughly in September or 2012. She also states that    she has obtained a major molecular response with a BCR-ABL PCR less than 0.1%.      She was started on Imatinib targeted treatment at a dose of 400 mg daily and     unfortunately had significant side effects including swelling, fatigue and     difficulty with sleep.  Her dose was decreased to 200 mg daily and her last BCR-    ABL fusin analysis performed on 2014 unfortunately showed a BCR-ABL ratio     of 0.070% which previously was not detected on 2013.            HPI - Oncology Interim      Patient returns for follow-up and ongoing treatment of her CML.  She is taking     Gleevec daily.  She denies any problems or side effects from medication.  She is    compliant with her regimen.   She denies fever, chills, weight loss, night     sweats, lymphadenopathy.  She denies excessive bruising or bleeding.  She     reports good energy level and is working full-time.            Cancer Details            CML-dx June 2012            Clinical Staging      CHRONIC PHASE CML            Treatments      Chemotherapy      Began Gleevec Summer 2013            Clinical Trial Participant      No            ECOG Performance Status      0            Most Recent Lab Findings            Item Value  Date Time             White Blood Count 3.93 10*3/uL L 11/19/19 0727             Red Blood Count 4.26 10*6/uL 11/19/19 0727             Hemoglobin 13.8 g/dL 11/19/19 0727             Hematocrit 42.5 % 11/19/19 0727             Mean Corpuscular Volume 99.8 fL H 11/19/19 0727             Mean Corpuscular Hemoglobin 32.4 pg H 11/19/19 0727             Mean Corpuscular Hemoglobin Concent 32.5 L 11/19/19 0727             Red Cell Distribution Width 12.9 % 11/19/19 0727             RDW Standard Deviation 47.0 fL H 11/19/19 0727             Platelet Count 241 10*3/uL 11/19/19 0727             Mean Platelet Volume 9.5 fL 11/19/19 0727             Granulocytes (%) 51.1 % 11/19/19 0727             Granulocytes # 2.01 10*3/uL 11/19/19 0727             Lymphocytes # (Auto) 1.54 10*3/uL 11/19/19 0727             Monocytes # (Auto) 0.33 10*3/uL 11/19/19 0727             Eosinophils # (Auto) 0.03 10*3/uL 11/19/19 0727             Basophils # (Auto) 0.02 10*3/uL 11/19/19 0727             Immature Granulocytes % 0.0 % 11/19/19 0727             Lymphocytes % 39.2 % 11/19/19 0727             Monocytes % 8.4 % 11/19/19 0727             Eosinophils % 0.8 % 11/19/19 0727             Basophils % 0.5 % 11/19/19 0727             Sodium Level 141 mmol/L 11/19/19 0727             Potassium Level 4.0 mmol/L 11/19/19 0727             Chloride Level 102 mmol/L 11/19/19 0727             Carbon Dioxide Level 22 mmol/L 11/19/19 0727             Anion Gap  21 mmol/L H 11/19/19 0727             Blood Urea Nitrogen 9 mg/dL 11/19/19 0727             Creatinine 0.84 mg/dL 11/19/19 0727             Glomerular Filtration Rate Calc >60 mL/min/{1.73_m2} 11/19/19 0727             BUN/Creatinine Ratio 11 {ratio} 11/19/19 0727             Glucose Level 97 mg/dL 11/19/19 0727             Serum Osmolality 291 11/19/19 0727             Calcium Level 9.3 mg/dL 11/19/19 0727             Total Bilirubin 0.46 mg/dL 11/19/19 0727             Aspartate Amino Transf (AST/SGOT) 13 U/L L 11/19/19 0727             Alanine Aminotransferase (ALT/SGPT) 13 U/L 11/19/19 0727             Alkaline Phosphatase 56 U/L 11/19/19 0727             Total Protein 7.1 g/dL 11/19/19 0727             Albumin 4.6 g/dL 11/19/19 0727             Globulin 2.5 g/dL 11/19/19 0727             Albumin/Globulin Ratio 1.8 {ratio} 11/19/19 0727            Item Value  Date Time             BCR/abl1 International Scale % 0.0311 % 11/26/19 1034            PAST, FAMILY   Past Medical History      Past Medical History:  None      Other PMH:        CML      Other Hematology History:        CML            Past Surgical History      Appendectomy            Family History      Family History:  Breast Cancer (MOTHER)            MOTHER AND FATHER LIVING NO CHRONIC ILLNESSES            Social History      Marital Status:        Lives independently:  Yes      Number of Children:  2      Occupation:  TEACHER            Tobacco Use      Tobacco status:  Never smoker            Alcohol Use      Alcohol intake:  None            Substance Use      Substance use:  Denies use            REVIEW OF SYSTEMS      General:  Denies: Fatigue      ENT:  Denies Headache, Denies Hearing Loss      Gastrointestinal:  Admits Nausea/Vomiting      Musculoskeletal:  Denies Muscle Pain      Integumentary:  Denies Itching      Neurologic:  Denies Dizziness, Denies Numbness\Tingling      Psychiatric:  Denies Anxiety, Denies Depression            VITAL  SIGNS AND SCORES      Vitals      Weight 118 lbs 9.720 oz / 53.8 kg      Temperature 98.4 F / 36.89 C - Temporal      Pulse 94      Respirations 4      Blood Pressure 130/79 Sitting, Left Arm      Pulse Oximetry 100%, RM AIR            Pain Score      Pain Scale Used:  Numerical      Pain Intensity:  0            Fatigue Score      Fatigue (0-10 scale):  0 (none)            EXAM      General Appearance:  Positive for: Alert, Oriented x3, Cooperative;          Negative for: Acute Distress      Eye:  Positive for: Anicteric Sclerae, Moist Conjunctiva      Neck:  Positive for: Supple;          Negative for: JVD, Masses      Respiratory:  Positive for: CTAB, Normal Respiratory Effort      Abdomen/Gastro:  Positive for: Normal Active Bowel Sounds, Soft;          Negative for: Distention, Hepatosplenomegaly, Tenderness      Cardiovascular:  Positive for: RRR;          Negative for: Gallop, Murmur, Peripheral Edema, Rub      Psychiatric:  Positive for: Appropriate Affect, Intact Judgement      Lymphatic:  Negative for: Cervical, Infraclavicular, Supraclavicular            PREVENTION      Hx Influenza Vaccination:  No      Influenza Vaccine Declined:  Yes      2 or More Falls Past Year?:  No      Fall Past Year with Injury?:  No      Hx Pneumococcal Vaccination:  No      Encouraged to follow-up with:  PCP regarding preventative exams.      Chart initiated by      CHESTER GOTTI MA            ALLERGY/MEDS      Allergies      Coded Allergies:             NICKEL (Verified  Allergy, Severe, 12/31/19)            Medications      Last Reconciled on 12/31/19 12:46 by ELVER SHARP      Ondansetron Hcl (ONDANSETRON HCL) 8 Mg Tablet      8 MG PO Q8H PRN for NAUSEA, #60 TAB 6 Refills         Prov: TRISTEN KIM         7/23/19       Imatinib Mesylate (Gleevec) 400 Mg Tablet      400 MG PO QDAY for 30 Days, #30 TAB         Reported         5/1/19       Norgestrel/Ethinyl Estradiol (Low-Ogestrel 28) 1 Each Tablet      1 TAB PO QDAY,  #28 TAB         Reported         9/13/16       Cetirizine Hcl (zyrTEC) 5 Mg Tablet      10 MG PO QDAY for 30 Days, #60 TAB         Reported         7/12/16      Medications Reviewed:  No Changes made to meds            IMPRESSION/PLAN      Diagnosis      CML (chronic myeloid leukemia) - C92.10            Notes      Chronic phase.  On Gleevec.  Tolerating well.  BCR/ABL international scale ratio    0.03%.  Continue Gleevec.  RTC 6 months for OV with labs prior.      New Diagnostics      * CBC With Auto Diff, 6 Months         Dx: CML (chronic myeloid leukemia) - C92.10      * CMP Comp Metabolic Panel, 6 Months         Dx: CML (chronic myeloid leukemia) - C92.10      * BCR-ABL1 Qual refl BCR QN BCAB, 6 Months         Dx: CML (chronic myeloid leukemia) - C92.10            Patient Education      Patient Education Provided:  Yes            Electronically signed by ELVER SHARP  12/31/2019 12:47       Disclaimer: Converted document may not contain table formatting or lab diagrams. Please see [facility] for the authenticated document.

## 2021-05-28 NOTE — PROGRESS NOTES
Patient: IRENA GARCIA     Acct: [id number]     Report: #[id number]  UNIT #: [id number]     : 1985    Encounter Date:2021  PRIMARY CARE: Pepe Aguilar  ***Signed***  --------------------------------------------------------------------------------------------------------------------  NURSE INTAKE      Visit Type      Established Patient Visit            Chief Complaint      CML F/U            History and Present Illness      Past Oncology Illness History      Pt returns for followup of her chronic phase CM diagnosed in 2012.  At that    time she had a white count of 38,500 with a platelet count of 835,000.  At that     time she also had a basophilia, eosinophilia.  She ultimately underwent a bone     marrow biopsy examination which showed chromosomal analysis compatible with CML     with translocation 9;22.  She was then referred to Dr. Pizano in [place], [place] who has been following her since that time.  Per the patient, she     quickly achieved a complete hematologic response and on our laboratory     examination it appears this occurred roughly in September or 2012. She     also states that she has obtained a major molecular response with a BCR-ABL PCR     less than 0.1%.  She was started on Imatinib targeted treatment at a dose of 400    mg daily and unfortunately had significant side effects including swelling,     fatigue and difficulty with sleep.  Her dose was decreased to 200 mg daily and     her last BCR-ABL fusin analysis performed on 2014 unfortunately showed a     BCR-ABL ratio of 0.070% which previously was not detected on 2013.            HPI - Oncology Interim      Patient returns for ongoing treatment of her CML.  She is on Gleevec.  She is     taking her medication daily.  She reports occasional nausea with her Gleevec but    Zofran helps.  She is eating drinking well, her weight is maintained.  She notes    excellent energy level.  She denies  excessive bruising or bleeding.  She denies     fever, chills, infectious signs or symptoms.            Cancer Details            CML-dx June 2012            Clinical Staging      CHRONIC PHASE CML            Treatments      Chemotherapy      Began Gleevec Summer 2013            Clinical Trial Participant      No            ECOG Performance Status      0            Most Recent Lab Findings      Laboratory Tests      1/7/21 08:46            PAST, FAMILY   Past Medical History      Past Medical History:  None      Other PMH:        CML      Other Hematology History:        CML            Past Surgical History      Appendectomy            Family History      Family History:  Breast Cancer            MOTHER AND FATHER LIVING NO CHRONIC ILLNESSES            Social History      Lives independently:  Yes      Number of Children:  2      Occupation:  TEACHER            Tobacco Use      Tobacco status:  Never smoker            Substance Use      Substance use:  Denies use            REVIEW OF SYSTEMS      General:  Denies: Fatigue, Fever      ENT:  Denies Hoarseness      Cardiovascular:  Denies Palpitations      Respiratory:  Denies: Shortness of Air      Gastrointestinal:  Denies Diarrhea, Denies Problem Swallowing      Musculoskeletal:  Denies Painful Joints      Integumentary:  Denies Rash      Neurologic:  Denies Numbness\Tingling            VITAL SIGNS AND SCORES      Vitals      Weight 119 lbs 0.774 oz / 54 kg      Temperature 97.1 F / 36.17 C - Temporal      Pulse 103      Respirations 16      Blood Pressure 121/82 Sitting, Left Arm      Pulse Oximetry 100%, RM AIR            Pain Score      Pain Scale Used:  Numerical      Pain Intensity:  0            Fatigue Score      Fatigue (0-10 scale):  0 (none)            EXAM      General Appearance:  Positive for: Alert, Cooperative;          Negative for: Acute Distress      Neck:  Positive for: Supple;          Negative for: JVD, Masses      Respiratory:  Positive for:  CTAB, Normal Respiratory Effort      Abdomen/Gastro:  Positive for: Normal Active Bowel Sounds, Soft;          Negative for: Hepatosplenomegaly, Tenderness      Cardiovascular:  Positive for: RRR;          Negative for: Gallop, Murmur, Peripheral Edema, Rub      Psychiatric:  Positive for: Appropriate Affect, Intact Judgement      Lymphatic:  Negative for: Cervical, Infraclavicular, Supraclavicular            PREVENTION      Influenza Vaccine Declined:  Yes      2 or More Falls in Past Year?:  No      Fall Past Year with Injury?:  No      Encouraged to follow-up with:  PCP regarding preventative exams.      Chart initiated by      CHESTER GOTTI MA            ALLERGY/MEDS      Allergies      Coded Allergies:             NICKEL (Verified  Allergy, Severe, 1/14/21)           NO KNOWN DRUG ALLERGIES (Verified  Allergy, Unknown, 1/14/21)            Medications      Last Reconciled on 1/14/21 15:54 by ELVER SHARP      Ondansetron Hcl (ONDANSETRON HCL) 8 Mg Tablet      8 MG PO Q8H PRN for NAUSEA, #60 TAB 6 Refills         Prov: ELVER SHARP         6/30/20       Imatinib Mesylate (Gleevec) 400 Mg Tablet      400 MG PO QDAY for 30 Days, #30 TAB 6 Refills         Prov: ELVER SHARP         4/8/20       Norgestrel/Ethinyl Estradiol (Low-Ogestrel 28) 1 Each Tablet      1 TAB PO QDAY, #28 TAB         Reported         9/13/16       Cetirizine Hcl (zyrTEC) 5 Mg Tablet      10 MG PO QDAY for 30 Days, #60 TAB         Reported         7/12/16      Medications Reviewed:  No Changes made to meds            IMPRESSION/PLAN      Diagnosis      CML (chronic myeloid leukemia) - C92.10      Chronic phase.  Patient is on Gleevec.  Tolerating well.  CBC demonstrates     slight leukocytopenia but the hemoglobin, ANC, platelet count are normal.      BCR/ABL is pending but previously showed major molecular response.  This office     will call her with the results when they return.  Continue Gleevec.  RTC 6 mo    nths for OV with labs  prior.            Notes      New Diagnostics      * CCC CBC With Auto Diff, 6 Months         Dx: CML (chronic myeloid leukemia) - C92.10      * CCC Comp Metabolic Panel, 6 Months         Dx: CML (chronic myeloid leukemia) - C92.10      * Bcr-Abl1 Major Quantitative, 6 Months         Dx: CML (chronic myeloid leukemia) - C92.10            Pain      Pain Zero Today            Advanced Care Plan Discussion      Declines Discussion [id number]            Patient Education      Patient Education Provided:  Yes            Electronically signed by ELVER SHARP  01/14/2021 15:54       Disclaimer: Converted document may not contain table formatting or lab diagrams. Please see [facility] for the authenticated document.

## 2021-05-28 NOTE — PROGRESS NOTES
Patient: SAILAJA GARCIA     Acct: [id number]     Report: #[id number]  UNIT #: [id number]     : 1985    Encounter Date:2018  PRIMARY CARE: Pepe Aguilar  ***Signed***  --------------------------------------------------------------------------------------------------------------------  Chief Complaint      2018      CML      Intent of Therapy:  Palliative            Vitals      Height 5 ft  / 152.40 cm      Weight 118 lbs 9.720 oz / 53.8 kg      BSA 1.52 m2      BMI 23.2 kg/m2      Temperature 98.8 F / 37.11 C - Temporal      Pulse 90      Respirations 16      Blood Pressure 110/55 Sitting, Right Arm      Pulse Oximetry 100%, RM AIR            Allergies      Coded Allergies:             NICKEL (Verified  Allergy, Severe, 18)            Medications      Last Reconciled on 18 15:13 by MYRON HIGGINS      Norgestrel/Ethinyl Estradiol (Low-Ogestrel 28) 1 Each Tablet      1 TAB PO QDAY, #28 TAB         Reported         16       Cetirizine Hcl (ZyrTEC*) 5 Mg Tablet      10 MG PO QDAY for 30 Days, #60 TAB         Reported         16       Imatinib Mesylate (Gleevec) 100 Mg Tablet      400 MG PO QDAY, TAB         Reported         16      Current Medications      Current Medications Reviewed 18            Pain and Fatigue Scales      Pain Assessment:           Experiencing any pain?:  No      Fatigue:           Experiencing any fatigue?:  No            Chemo Status      On Oral Chemotherapy?:  Yes      Comment:        Gleevec            Other      Clinical Trial Participant?:  No            Past Oncology Illness History      Ms. Sailaja Garcia presents to my clinic today for a consult for her chronic     myeloid leukemia which was diagnosed in 2012.  At that time she had a     white count of 38,500 with a platelet count of 835,000.  At that time she also     had a basophilia, eosinophilia.  She ultimately underwent a bone marrow biopsy     examination which showed  "chromosomal analysis compatible with CML with     translocation 9;22.  She was then referred to Dr. Pizano in [place], [place]     who has been following her since that time.  Per the patient, she quickly     achieved a complete hematologic response and on our laboratory examination it     appears this occurred roughly in September or October 2012. She also states     that she has obtained a major molecular response with a BCR-ABL PCR less than     0.1%.  She was started on Imatinib targeted treatment at a dose of 400 mg daily     and unfortunately had significant side effects including swelling, fatigue and     difficulty with sleep.  Her dose was decreased to 200 mg daily and her last BCR-    ABL fusin analysis performed on 03/27/2014 unfortunately showed a BCR-ABL ratio     of 0.070% which previously was not detected on 11/27/2013.             She initially presented to my clinic for the first time on 05/23/14 as she     transferred her care to our service as she lives in [place].            Interim History      Date:  Jun 21, 2018      Presents after a long hiatus for her chronic phase CML on Gleevec 400 mg daily.       Last seen November 2017.  She reports \"I had no vacation time to come in\".      Denies fever, chills, night sweats or weight loss.            REVIEW OF SYSTEMS      General:  Denies: Appetite change, Fatigue, Weight loss      Eyes:  Denies: Blurred vision, Corrective lenses      Ears, nose, mouth, throat:  Denies: Headache, Seizures, Visual Changes      Cardiovascular:  Denies: Chest pain, Irregular heartbeat, Palpitations      Respiratory:  Denies: Shortness of Air, Productive cough, Coughing blood      Gastrointestinal:  Denies: Nausea, Vomiting, Constipation, Diarrhea      Kidney/Bladder:  Denies: Painful Urination, Blood in urine      Musculoskeletal:  Denies: New Back pain, Muscle weakness      Skin:  DENIES: Easy Bleeding, Nail changes, Rash      Neurological:  Denies: Dizziness, " Numbness\Tingling      Psychiatric:  Complains of: AAO X 3      Endocrine:  DiabetesThyroid Disorder      Hematologic/lymphatic:  Denies: Bruising, Bleeding, Enlarged Lymph Nodes      Reproductive:  Denies Pregnant, Denies Menopause            EXAM      General Appearance:  Alert, Oriented X3, Cooperative, No acute distress      Eyes:  Anicteric Sclerae, Moist Conjunctiva, PERRLA      HEENT:  Orophraynx clear, No Erythema, No Exudates, No Pallor      Neck:  Supple, Full ROM, No Carotid Bruits      Respiratory:  CTAB, No Diminished Breath, No Crackels      Abdomen\Gastro:  Soft, No Masses      Cardio:  RRR, No Murmur, No, Peripheral Edema, Normal PMI      Skin:  Normal Temperature, Normal Tone, No Rash, lesions, ulcers      Psychiatric:  Appropriate Affect, Intact Judgement, AAO x3      Neuro:  Cranial Nerve II-XII Inta, No Focal Sensory Deficit      Muscularskeletal:  Normal Gait and Station, Full ROM of extremeties      Extremities:  No Digital Cyanosis, No Digital Ischemia, Normal Gait and station    , No Weakness      Lymphatic:  No Cervical, No Supraclavicular, No Infraclavicular, No Axillary            Current Plan      I reviewed with Ms. Mercer her previous work-up and diagnosis as well as     treatment up to today's date.  At her very first appointment I had discussed     the possibility of her getting pregnant in the near future and what this would     entail.             Unfortunately at her initial appointment I noted her BCR-ABL ratio had     increased from undetectable to 0.070% while she was on 300mg Imatinib.  This     indicated that she perhaps might have started to lose control on this decreased     dose.  She was then increased to 400 mg daily with a marked improvement in her     BCR-ABL PCR IS.              In regards to possible planning for pregnancy, I had recommended that she get     back to having an undetectable fusion protein and remain on this medication for     at least a few years prior  to trying to conceive. I had recommended that she     remain on the TKI during her attempt to conceive in the event that it might     take multiple months to get pregnant. I again note that Imatinib is a pregnancy     category D medication. As noted above she self-discontinued her TKI against my     advice prior to getting pregnant and had an approximate 3 month period where     she was without TKI.             As her initial BCR/ABL PCR IS had increased to 15% I felt it prudent for her to     be seen at the [facility] for their opinion     and to have her seen initially for the possibility that she could require care     there at a later time if her CML were to progress. She was seen by the     Malignant Hematology Chair, Dr. Tima Meng, on 09/24/15. The     recommendations were close follow-up with repeat labs every 4-6 weeks during     pregnancy with CBC, CMP, LDH and BCR-ABL. If she were to progress to     accelerated or blast phase Hydrea or interferon could be considered. The goal     at that time would be to delay delivery as long as possible.I would then also     consider a repeat referral to the [facility] for evaluation and     management by both high risk OB and hematology. After she delivers she would     then be put back on the Tyrosine kinase inhibitor and we would monitor her     closely to see if she was able to achieve a complete remission.  She voiced her     understanding of this and was in agreement.            Current Plan: 6/21/18      Discussed with patient hard to assist in managing CML when she does not come     in.  Stressed importance of seeing her every 3 - 4 months.  Discussed with     patient that I would see her at a later day appointment if this would help her     be seen more regularly.  Will obtain CBC, CMP and BCR - Abl today.  Will     continue Gleevec 400 mg daily. Follow up in 3-4 months.              ONCOLOGICAL AND  TREATMENT HISTORY:            1. 06/2012 - Diagnosed with CML (WBC 38.5, Platelet count 835,000, basophilia     and eosinophilia)      2. 06/2012 - Bone marrow with 9;22 translocation      3. 06/2012 - Initially placed on Gleevec 400 mg daily then decreased to 200 mg     secondary to side effects.       4. 10/2012 - Received complete hematological response and major molecular     response with BCR-Abl PCR less garret 0.1%      5. 03/27/14 - BCR-Abl ratio 0.07%  (not detected 11/27/13)      6. 05/23/14 - Transferred to [facility] CCC to Dr. Alatorre, Gleevec increased 400 mg daily      7. 08/10/15 - Attempted to get pregnant and off TKI x 3 months      8. 03/2016 - Gleevec restarted after delivery of child            Available for immediate release. Please forward a copy to Dr. Sher Wilde,     Dr. Ernestine Mg, and Dr. Mohan Aguilar.            Item Value  Date Time             BCR/abl1 to abl1 Internat Scale % 0.0119 % 9/11/17 1911             BCR/abl1 to abl1 Internat Scale % 0.0655 % 3/23/17 0800             BCR/abl1 to abl1 Internat Scale % 0.1270 % 12/15/16 0840             BCR/abl1 to abl1 Internat Scale % 0.2947 % 9/21/16 1402             BCR/abl1 to abl1 Internat Scale % 0.3420 % 7/20/16 1548             BCR/abl1 to abl1 Internat Scale % 4.0290 % 5/18/16 1751             BCR/abl1 to abl1 Internat Scale % 21.2362 % 2/25/16 1914             BCR/abl1 to abl1 Internat Scale % 18.3155 % 1/27/16 1712             BCR/abl1 to abl1 Internat Scale % 15.2277 % 1/5/16 1350             BCR/abl1 to abl1 Internat Scale % 19.1669 % 11/30/15 0920            CML (chronic myeloid leukemia) - C92.10            Notes      New Diagnostics      * CBC, As Soon As Possible       Dx: CML (chronic myeloid leukemia) - C92.10      * CMP Comp Metabolic Panel, Stat       Dx: CML (chronic myeloid leukemia) - C92.10      * BCR-ABL1 Qual refl BCR QN BCAB, Stat       Dx: CML (chronic myeloid leukemia) - C92.10      Follow-up:  3 Months      Next Visit Labs:   CBC, CMP, Other      Intensive Monitor for Toxicity:  Labs Reviewed, Chemo Orders Reviewd, Meds\    Narcotics Reviewed      Review/Other:  Received Lab Test, Ordered Lab Test, Reviewed Medicine Test      ECOG Score:  0      Clinical Staging      CHRONIC PHASE CML      Medications changed:  Medication renewed            PREVENTION      Hx Influenza Vaccination:  Yes      Influenza Vaccine Declined:  No      2 or More Falls Past Year?:  No      Fall Past Year with Injury?:  No      Hx Pneumococcal Vaccination:  No      Encouraged to follow-up with:  PCP regarding preventative exams.      Chart initiated by      CHESTER GOTTI MA                 Disclaimer: Converted document may not contain table formatting or lab diagrams. Please see [employer] for the authenticated document.

## 2021-05-28 NOTE — PROGRESS NOTES
Patient: SAILAJA GARCIA     Acct: [id number]     Report: #[id number]  UNIT #: [id number]     : 1985    Encounter Date:10/29/2018  PRIMARY CARE: Pepe Aguilar  ***Signed***  --------------------------------------------------------------------------------------------------------------------  NURSE INTAKE      Visit Type      Established Patient Visit            Chief Complaint      CML            History and Present Illness      Past Oncology Illness History      Ms. Sailaja Garcia presents 15, transferring care to [facility], for chronic     myeloid leukemia, diagnosed in 2012.  At that time she had a white count of    38,500 with a platelet count of 835,000.  At that time she also had a     basophilia, eosinophilia.  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  She was then referred to Dr. Pizano in [place], [place]     who has been following her since that time.  Per the patient, she quickly     achieved a complete hematologic response and on our laboratory examination it     appears this occurred roughly in September or 2012. She also states that    she has obtained a major molecular response with a BCR-ABL PCR less than 0.1%.      She was started on Imatinib targeted treatment at a dose of 400 mg daily and     unfortunately had significant side effects including swelling, fatigue and     difficulty with sleep.  Her dose was decreased to 200 mg daily and her last BCR-    ABL fusin analysis performed on 2014 unfortunately showed a BCR-ABL ratio     of 0.070% which previously was not detected on 2013.            HPI - Oncology Interim      Returns to office.  Reports feeling well.  Taking Zofran with Gleevec helps     tremendously with nausea.  She remains fatigued; however, she continues to work     as  with minimal difficulty.  She denies fever or      lymphadenopathy at this time.  She denies night sweats or weight loss as well.      Tolerating 400mg Gleevec daily at this time.            Cancer Details            CML-dx June 2012            Clinical Staging      CHRONIC PHASE CML            Treatments      Chemotherapy      Gleevec 400mg qd            Clinical Trial Participant      No            ECOG Performance Status      0            VITAL SIGNS AND SCORES      Vitals      Height 5 ft  / 152.4 cm      Weight 116 lbs 13.501 oz / 53.0 kg      BSA 1.49 m2      BMI 22.8 kg/m2      Temperature 98.5 F / 36.94 C - Temporal      Pulse 91      Respirations 18      Blood Pressure 113/75 Sitting, Right Arm      Pulse Oximetry 100%, RM AIR            Pain Score      Experiencing any pain?:  No      Pain Scale Used:  Numerical      Pain Intensity:  0            Fatigue Score      Experiencing any fatigue?:  Yes      Fatigue (0-10 scale):  3            EXAM      General Appearance:  Positive for: Alert, Oriented x3, Cooperative;          Negative for: Acute Distress      Eye:  Positive for: Anicteric Sclerae, Moist Conjunctiva      Respiratory:  Positive for: CTAB, Normal Respiratory Effort      Abdomen/Gastro:  Positive for: Normal Active Bowel Sounds, Soft;          Negative for: Distention, Hepatosplenomegaly, Tenderness      Cardiovascular:  Positive for: RRR;          Negative for: Gallop, Murmur, Peripheral Edema, Rub      Psychiatric:  Positive for: Appropriate Affect, Intact Judgement      Upper Extremities:  Negative for: Clubbing, Digital Cyanosis, Digital Ischemia      Lymphatic:  Negative for: Axillary, Cervical, Infraclavicular            PREVENTION      Hx Influenza Vaccination:  Yes      Influenza Vaccine Declined:  No      2 or More Falls Past Year?:  No      Fall Past Year with Injury?:  No      Hx Pneumococcal Vaccination:  No      Encouraged to follow-up with:  PCP regarding preventative exams.      Chart initiated by      MISHEL CHILDRESS CMA             ALLERGY/MEDS      Allergies      Coded Allergies:             NICKEL (Verified  Allergy, Severe, 6/21/18)            Medications      Last Reconciled on 6/21/18 15:13 by MYRON HIGGINS      Norgestrel/Ethinyl Estradiol (Low-Ogestrel 28) 1 Each Tablet      1 TAB PO QDAY, #28 TAB         Reported         9/13/16       Cetirizine Hcl (ZyrTEC) 5 Mg Tablet      10 MG PO QDAY for 30 Days, #60 TAB         Reported         7/12/16       Imatinib Mesylate (Gleevec) 100 Mg Tablet      400 MG PO QDAY, TAB         Reported         5/11/16      Current Medications      Current Medications Reviewed 10/29/18            IMPRESSION/PLAN      Impression      CML.  Chronic phase.            Diagnosis      CML (chronic myeloid leukemia) - C92.10      Patient is on Gleevec 400 mg daily.  Doing well.  Continue current therapy.      Recheck next visit.  Continue Zofran as needed for nausea      New Medications      * Ondansetron HCl (Zofran) 8 MG TABLET: 8 MG PO Q8H PRN NAUSEA #30      New Diagnostics      * CBC With Auto Diff, Routine      * CMP Comp Metabolic Panel, Routine      * LDH, Routine      * BCR-ABL1 Qual refl BCR QN BCAB, Routine            Patient Education      Patient Education Provided:  Yes                 Disclaimer: Converted document may not contain table formatting or lab diagrams. Please see [employer] for the authenticated document.

## 2021-05-28 NOTE — PROGRESS NOTES
Patient: SAILAJA GARCIA     Acct: [id number]     Report: #[id number]  UNIT #: [id number]     : 1985    Encounter Date:2019  PRIMARY CARE: Pepe Aguilar  ***Signed***  --------------------------------------------------------------------------------------------------------------------  NURSE INTAKE      Visit Type      Established Patient Visit            Chief Complaint      CML            Referring Provider/Copies To      Provider Not Found in Lookup:  CANT REMEMBER      Primary Care Provider:  Pepe Aguilar            History and Present Illness      Past Oncology Illness History      Ms. Sailaja Garcia presents 15, transferring care to [facility], for chronic     myeloid leukemia, diagnosed in 2012.  At that time she had a white count of    38,500 with a platelet count of 835,000.  At that time she also had a     basophilia, eosinophilia.  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  She was then referred to Dr. Pizano in [place], [place]     who has been following her since that time.  Per the patient, she quickly     achieved a complete hematologic response and on our laboratory examination it     appears this occurred roughly in September or 2012. She also states that    she has obtained a major molecular response with a BCR-ABL PCR less than 0.1%.      She was started on Imatinib targeted treatment at a dose of 400 mg daily and     unfortunately had significant side effects including swelling, fatigue and     difficulty with sleep.  Her dose was decreased to 200 mg daily and her last BCR-    ABL fusin analysis performed on 2014 unfortunately showed a BCR-ABL ratio     of 0.070% which previously was not detected on 2013.            HPI - Oncology Interim      33-year-old white female with history of chronic phase CML.  She is on Gleevec.     She is taking her medication  daily.  She denies any problems or side effects     from the medication.  She continues to work full-time as a .  She     reports good energy level.  She denies fever, chills, weight loss, night sweats     or lymphadenopathy.  She denies excessive bruising or bleeding.  She states that    she recently had the flu but has had no other infections since her last visit.            Cancer Details            CML-dx June 2012            Clinical Staging      CHRONIC PHASE CML            Clinical Trial Participant      No            ECOG Performance Status      0            Most Recent Lab Findings      Laboratory Tests            Item Value  Date Time             White Blood Count 3.35 10*3/uL L 1/28/19 0436             Red Blood Count 3.76 10*6/uL L 1/28/19 0436             Hemoglobin 12.50 g/dL 1/28/19 0436             Hematocrit 37.2 % 1/28/19 0436             Mean Corpuscular Volume 98.9 fL 1/28/19 0436             Mean Corpuscular Hemoglobin 33.4 pg H 1/28/19 0436             Mean Corpuscular Hemoglobin Concent 33.7 % 1/28/19 0436             Red Cell Distribution Width 11.7 % 1/28/19 0436             Platelet Count 143.00 10*3/uL 1/28/19 0436             Mean Platelet Volume 7.2 fL L 1/28/19 0436             Granulocytes (%) 74.2 % 1/28/19 0436             Lymphocytes (%) (Auto) 16.30 % L 1/28/19 0436             Monocytes (%) (Auto) 8.97 % 1/28/19 0436             Eosinophils (%) (Auto) 0.27 % 1/28/19 0436             Basophils (%) (Auto) 0.27 % 1/28/19 0436             Granulocytes # 2.49 10*3/uL 1/28/19 0436             Lymphocytes # (Auto) 0.55 10*3/uL L 1/28/19 0436             Monocytes # (Auto) 0.30 10*3/uL 1/28/19 0436             Eosinophils # (Auto) 0.01 10*3/uL 1/28/19 0436             Basophils # (Auto) 0.01 10*3/uL 1/28/19 0436             Sodium Level 137 mmol/L 1/28/19 0436             Potassium Level 3.6 mmol/L 1/28/19 0436             Chloride Level 104 mmol/L 1/28/19 0436              Carbon Dioxide Level 23 mmol/L 1/28/19 0436             Anion Gap 14 mmol/L 1/28/19 0436             Blood Urea Nitrogen 5 mg/dL 1/28/19 0436             Creatinine 0.71 mg/dL 1/28/19 0436             Glomerular Filtration Rate Calc >60 mL/min/{1.73_m2} 1/28/19 0436             BUN/Creatinine Ratio 7 {ratio} 1/28/19 0436             Glucose Level 105 mg/dL H 1/28/19 0436             Serum Osmolality 282 1/28/19 0436             Calcium Level 8.2 mg/dL L 1/28/19 0436             Total Bilirubin 0.28 mg/dL 1/28/19 0436             Aspartate Amino Transf (AST/SGOT) 12 U/L L 1/28/19 0436             Alanine Aminotransferase (ALT/SGPT) 10 U/L 1/28/19 0436             Alkaline Phosphatase 43 U/L 1/28/19 0436             Total Protein 6.3 g/dL 1/28/19 0436             Albumin 4.0 g/dL 1/28/19 0436             Globulin 2.3 g/dL 1/28/19 0436             Albumin/Globulin Ratio 1.7 {ratio} 1/28/19 0436             Lipase 26 U/L 1/28/19 0436             Total Beta HCG <0.5 m(iU)/mL 1/28/19 0436            PAST, FAMILY   Past Medical History      Other PMH      CML      Other Hematology/Oncology      CML            Past Surgical History      Appendectomy            Family History      Family History:  Breast Cancer (MOTHER)      MOTHER AND FATHER STILL LIVING.            Social History      Marital Status:        Lives independently:  Yes      Number of Children:  2      Occupation:  TEACHER            REVIEW OF SYSTEMS      General:  Denies: Appetite Change, Fatigue, Fever, Night Sweats, Weight Gain,     Weight Loss      Eye:  Denies: Blurred Vision, Corrective Lenses, Diplopia, Eye Irritation, Eye     Pain, Eye Redness, Spots in Vision, Vision Loss      ENT:  Denies: Headache, Hearing Loss, Hoarseness, Seizures, Sinus Congestion,     Sore Throat      Cardiovascular:  Denies: Chest Pain, Edema Ankles, Edema Legs, Irregular     Heartbeat, Palpitations      Respiratory:  Denies: Coughing Blood, Productive Cough,  Shortness of Air,     Wheezing      Gastrointestinal:  Denies: Bloody Stools, Constipation, Diarrhea, Frequent     Heartburn, Nausea, Problem Swallowing, Tarry Stools, Unable to Control Bowels,     Vomiting      Genitourinary (female):  Denies: Blood in Urine, Decrease Urine Stream, Frequent     Urination, Incontinence, Painful Urination      Musculoskeletal:  Denies: Back Pain, Leg Cramps, Muscle Pain, Muscle Weakness,     Painful Joints, Swollen Joints      Integumentary:  Denies: Bleeds Easily, Bruises Easily, Hair Changes, Jaundice,     Lesions, Mole Changes, Nail Changes, Pigment Changes, Rash, Skin Discoloration      Neurologic:  Denies: Dizziness, Fainting, Numbness\Tingling, Paralysis, Seizures      Psychiatric:  Denies: Anxiety, Confused, Depression, Disoriented, Memory Loss      Endocrine:  Denies: Cold Intolerance, Diabetes, Excessive Sweating, Excessive     Thirst, Excessive Urination, Heat Intolerance, Flushing, Hyperthyroidism, H    ypothyroidism      Hematologic/Lymphatic:  Denies: Bruising, Bleeding, Enlarged Lymph Nodes,     Recurrent Infections, Transfusions            VITAL SIGNS AND SCORES      Vitals      Height 5 ft  / 152.4 cm      Weight 119 lbs 11.356 oz / 54.3 kg      BSA 1.50 m2      BMI 23.4 kg/m2      Temperature 98.5 F / 36.94 C - Temporal      Pulse 104      Respirations 16      Blood Pressure 115/74 Sitting, Left Arm      Pulse Oximetry 100%, RM AIR            Pain Score      Pain Scale Used:  Numerical      Pain Intensity:  0            Fatigue Score      Fatigue (0-10 scale):  0 (none)            EXAM      General Appearance:  Positive for: Alert, Oriented x3, Cooperative;          Negative for: Acute Distress      Respiratory:  Positive for: CTAB, Normal Respiratory Effort      Abdomen/Gastro:  Positive for: Normal Active Bowel Sounds, Soft;          Negative for: Distention, Hepatosplenomegaly, Tenderness      Cardiovascular:  Positive for: RRR;          Negative for: Gallop,  Murmur, Peripheral Edema, Rub      Psychiatric:  Positive for: Appropriate Affect, Intact Judgement            PREVENTION      Hx Influenza Vaccination:  Yes      Influenza Vaccine Declined:  No      2 or More Falls Past Year?:  No      Fall Past Year with Injury?:  No      Hx Pneumococcal Vaccination:  No      Encouraged to follow-up with:  PCP regarding preventative exams.      Chart initiated by      CHESTER GOTTI MA            ALLERGY/MEDS      Allergies      Coded Allergies:             NICKEL (Verified  Allergy, Severe, 2/6/19)            Medications      Last Reconciled on 2/6/19 17:19 by ELVER SHARP      Ondansetron HCl (Zofran) 8 Mg Tablet      8 MG PO Q8H PRN for NAUSEA, #30 TAB 3 Refills         Prov: TRISTEN KIM         10/29/18       Norgestrel/Ethinyl Estradiol (Low-Ogestrel 28) 1 Each Tablet      1 TAB PO QDAY, #28 TAB         Reported         9/13/16       Cetirizine Hcl (ZyrTEC) 5 Mg Tablet      10 MG PO QDAY for 30 Days, #60 TAB         Reported         7/12/16       Imatinib Mesylate (Gleevec) 100 Mg Tablet      400 MG PO QDAY, TAB         Reported         5/11/16      Medications Reviewed:  No Changes made to meds            IMPRESSION/PLAN      Impression      CML.  Chronic phase.            Diagnosis      CML (chronic myeloid leukemia) - C92.10      Patient is on Gleevec 400 mill grams daily.  Tolerating well.  Excellent     response to therapy.  Continue Gleevec 400 mill grams daily.  RTC 3 months for     OV with labs prior including BCR/ABL.            Notes      New Diagnostics      * CBC With Auto Diff, 3 Months         Dx: CML (chronic myeloid leukemia) - C92.10      * CMP Comp Metabolic Panel, 3 Months         Dx: CML (chronic myeloid leukemia) - C92.10      * BCR-ABL1 Qual refl BCR QN BCAB, 3 Months         Dx: CML (chronic myeloid leukemia) - C92.10      * LDH, 3 Months         Dx: CML (chronic myeloid leukemia) - C92.10            Patient Education      Patient Education  Provided:  Yes                 Disclaimer: Converted document may not contain table formatting or lab diagrams. Please see [employer] for the authenticated document.

## 2021-06-16 DIAGNOSIS — C92.10 CHRONIC MYELOID LEUKEMIA (HCC): Primary | ICD-10-CM

## 2021-06-28 PROBLEM — F41.9 ANXIETY: Status: ACTIVE | Noted: 2021-06-28

## 2021-06-28 PROBLEM — C80.1 CANCER: Status: ACTIVE | Noted: 2021-06-28

## 2021-06-28 PROBLEM — C95.90 LEUKEMIA: Status: ACTIVE | Noted: 2021-06-28

## 2021-06-28 RX ORDER — BUPROPION HYDROCHLORIDE 75 MG/1
TABLET ORAL
COMMUNITY
End: 2022-06-01

## 2021-06-28 RX ORDER — IMATINIB MESYLATE 100 MG/1
TABLET, FILM COATED ORAL
COMMUNITY
End: 2022-01-06

## 2021-06-28 RX ORDER — AMOXICILLIN AND CLAVULANATE POTASSIUM 500; 125 MG/1; MG/1
1 TABLET, FILM COATED ORAL 2 TIMES DAILY
COMMUNITY
Start: 2021-04-30 | End: 2022-02-21 | Stop reason: SDUPTHER

## 2021-06-28 RX ORDER — IBUPROFEN 200 MG
TABLET ORAL
COMMUNITY
End: 2022-06-01

## 2021-06-28 RX ORDER — NORETHINDRONE ACETATE AND ETHINYL ESTRADIOL, ETHINYL ESTRADIOL AND FERROUS FUMARATE 1MG-10(24)
KIT ORAL
COMMUNITY
End: 2022-06-01 | Stop reason: SDUPTHER

## 2021-06-28 RX ORDER — CETIRIZINE HYDROCHLORIDE 10 MG/1
TABLET ORAL
COMMUNITY
End: 2022-06-01

## 2021-06-28 RX ORDER — BROMPHENIRAMINE MALEATE, PSEUDOEPHEDRINE HYDROCHLORIDE, AND DEXTROMETHORPHAN HYDROBROMIDE 2; 30; 10 MG/5ML; MG/5ML; MG/5ML
SYRUP ORAL
COMMUNITY
Start: 2021-05-16 | End: 2022-02-21 | Stop reason: SDUPTHER

## 2021-06-28 RX ORDER — LORATADINE 10 MG/1
TABLET ORAL
COMMUNITY
End: 2022-06-01

## 2021-06-28 RX ORDER — ONDANSETRON 4 MG/1
TABLET, FILM COATED ORAL
COMMUNITY
End: 2021-08-02 | Stop reason: SDUPTHER

## 2021-06-29 ENCOUNTER — LAB (OUTPATIENT)
Dept: ONCOLOGY | Facility: HOSPITAL | Age: 36
End: 2021-06-29

## 2021-06-29 DIAGNOSIS — C92.10 CHRONIC MYELOID LEUKEMIA (HCC): ICD-10-CM

## 2021-06-29 LAB
ALBUMIN SERPL-MCNC: 4.52 G/DL (ref 3.5–5.2)
ALBUMIN/GLOB SERPL: 2.4 G/DL
ALP SERPL-CCNC: 53 U/L (ref 39–117)
ALT SERPL W P-5'-P-CCNC: 10 U/L (ref 1–33)
ANION GAP SERPL CALCULATED.3IONS-SCNC: 7.2 MMOL/L (ref 5–15)
AST SERPL-CCNC: 11 U/L (ref 1–32)
BASOPHILS # BLD AUTO: 0.01 10*3/MM3 (ref 0–0.2)
BASOPHILS NFR BLD AUTO: 0.1 % (ref 0–1.5)
BILIRUB SERPL-MCNC: 0.4 MG/DL (ref 0–1.2)
BUN SERPL-MCNC: 13 MG/DL (ref 6–20)
BUN/CREAT SERPL: 16.3 (ref 7–25)
CALCIUM SPEC-SCNC: 9.1 MG/DL (ref 8.6–10.5)
CHLORIDE SERPL-SCNC: 103 MMOL/L (ref 98–107)
CO2 SERPL-SCNC: 27.8 MMOL/L (ref 22–29)
CREAT SERPL-MCNC: 0.8 MG/DL (ref 0.57–1)
DEPRECATED RDW RBC AUTO: 43.8 FL (ref 37–54)
EOSINOPHIL # BLD AUTO: 0.04 10*3/MM3 (ref 0–0.4)
EOSINOPHIL NFR BLD AUTO: 0.6 % (ref 0.3–6.2)
ERYTHROCYTE [DISTWIDTH] IN BLOOD BY AUTOMATED COUNT: 12.6 % (ref 12.3–15.4)
GFR SERPL CREATININE-BSD FRML MDRD: 82 ML/MIN/1.73
GLOBULIN UR ELPH-MCNC: 1.9 GM/DL
GLUCOSE SERPL-MCNC: 94 MG/DL (ref 65–99)
HCT VFR BLD AUTO: 37.1 % (ref 34–46.6)
HGB BLD-MCNC: 12.7 G/DL (ref 12–15.9)
IMM GRANULOCYTES # BLD AUTO: 0.01 10*3/MM3 (ref 0–0.05)
IMM GRANULOCYTES NFR BLD AUTO: 0.1 % (ref 0–0.5)
LYMPHOCYTES # BLD AUTO: 3.02 10*3/MM3 (ref 0.7–3.1)
LYMPHOCYTES NFR BLD AUTO: 44 % (ref 19.6–45.3)
MCH RBC QN AUTO: 32.3 PG (ref 26.6–33)
MCHC RBC AUTO-ENTMCNC: 34.2 G/DL (ref 31.5–35.7)
MCV RBC AUTO: 94.4 FL (ref 79–97)
MONOCYTES # BLD AUTO: 0.43 10*3/MM3 (ref 0.1–0.9)
MONOCYTES NFR BLD AUTO: 6.3 % (ref 5–12)
NEUTROPHILS NFR BLD AUTO: 3.35 10*3/MM3 (ref 1.7–7)
NEUTROPHILS NFR BLD AUTO: 48.9 % (ref 42.7–76)
PLAT MORPH BLD: NORMAL
PLATELET # BLD AUTO: 249 10*3/MM3 (ref 140–450)
PMV BLD AUTO: 9.3 FL (ref 6–12)
POTASSIUM SERPL-SCNC: 3.5 MMOL/L (ref 3.5–5.2)
PROT SERPL-MCNC: 6.4 G/DL (ref 6–8.5)
RBC # BLD AUTO: 3.93 10*6/MM3 (ref 3.77–5.28)
RBC MORPH BLD: NORMAL
SODIUM SERPL-SCNC: 138 MMOL/L (ref 136–145)
WBC # BLD AUTO: 6.86 10*3/MM3 (ref 3.4–10.8)
WBC MORPH BLD: NORMAL

## 2021-06-29 PROCEDURE — 85025 COMPLETE CBC W/AUTO DIFF WBC: CPT

## 2021-06-29 PROCEDURE — 85007 BL SMEAR W/DIFF WBC COUNT: CPT

## 2021-06-29 PROCEDURE — 81206 BCR/ABL1 GENE MAJOR BP: CPT

## 2021-06-29 PROCEDURE — 36415 COLL VENOUS BLD VENIPUNCTURE: CPT

## 2021-06-29 PROCEDURE — 80053 COMPREHEN METABOLIC PANEL: CPT

## 2021-06-29 PROCEDURE — 81207 BCR/ABL1 GENE MINOR BP: CPT

## 2021-07-04 LAB
INTERPRETATION: POSITIVE
LAB DIRECTOR NAME PROVIDER: NORMAL
LABORATORY COMMENT REPORT: NORMAL
REF LAB TEST METHOD: NORMAL
T(ABL1,BCR)B2A2/CONTROL BLD/T: NORMAL %
T(ABL1,BCR)B3A2/CONTROL BLD/T: 0.06 %
T(ABL1,BCR)E1A2/CONTROL BLD/T: NORMAL %

## 2021-07-12 PROBLEM — D72.829 LEUKOCYTOSIS: Status: ACTIVE | Noted: 2021-07-12

## 2021-07-12 RX ORDER — ACETAMINOPHEN 325 MG/1
TABLET ORAL
COMMUNITY
End: 2022-06-01

## 2021-07-12 RX ORDER — AMOXICILLIN AND CLAVULANATE POTASSIUM 875; 125 MG/1; MG/1
1 TABLET, FILM COATED ORAL 2 TIMES DAILY
COMMUNITY
Start: 2021-06-27 | End: 2022-02-18 | Stop reason: SDUPTHER

## 2021-07-12 RX ORDER — DOXYLAMINE SUCCINATE AND PYRIDOXINE HYDROCHLORIDE, DELAYED RELEASE TABLETS 10 MG/10 MG 10; 10 MG/1; MG/1
TABLET, DELAYED RELEASE ORAL
COMMUNITY
End: 2022-06-01

## 2021-07-12 RX ORDER — ALBUTEROL SULFATE 90 UG/1
AEROSOL, METERED RESPIRATORY (INHALATION)
COMMUNITY
Start: 2021-06-27

## 2021-07-12 RX ORDER — PROMETHAZINE HYDROCHLORIDE 25 MG/1
25 TABLET ORAL
COMMUNITY
End: 2022-06-01

## 2021-07-14 ENCOUNTER — APPOINTMENT (OUTPATIENT)
Dept: ONCOLOGY | Facility: HOSPITAL | Age: 36
End: 2021-07-14

## 2021-07-15 ENCOUNTER — OFFICE VISIT (OUTPATIENT)
Dept: ONCOLOGY | Facility: HOSPITAL | Age: 36
End: 2021-07-15

## 2021-07-15 VITALS
SYSTOLIC BLOOD PRESSURE: 114 MMHG | RESPIRATION RATE: 14 BRPM | OXYGEN SATURATION: 100 % | TEMPERATURE: 96.4 F | DIASTOLIC BLOOD PRESSURE: 62 MMHG | BODY MASS INDEX: 22.73 KG/M2 | WEIGHT: 116.4 LBS | HEART RATE: 96 BPM

## 2021-07-15 DIAGNOSIS — C92.10 CML (CHRONIC MYELOCYTIC LEUKEMIA) (HCC): Primary | ICD-10-CM

## 2021-07-15 PROBLEM — C95.90 LEUKEMIA: Status: RESOLVED | Noted: 2021-06-28 | Resolved: 2021-07-15

## 2021-07-15 PROBLEM — C80.1 CANCER: Status: RESOLVED | Noted: 2021-06-28 | Resolved: 2021-07-15

## 2021-07-15 PROCEDURE — 99213 OFFICE O/P EST LOW 20 MIN: CPT | Performed by: INTERNAL MEDICINE

## 2021-07-15 PROCEDURE — G0463 HOSPITAL OUTPT CLINIC VISIT: HCPCS

## 2021-07-15 RX ORDER — ONDANSETRON 8 MG/1
8 TABLET, ORALLY DISINTEGRATING ORAL EVERY 8 HOURS PRN
Qty: 30 TABLET | Refills: 3 | Status: CANCELLED | OUTPATIENT
Start: 2021-07-15

## 2021-07-15 NOTE — ASSESSMENT & PLAN NOTE
Patient has been on Gleevec since 2012.  Tolerating well.  Her BCR/ABL very good at 0.05.  Her blood counts look good.  She reports mild nausea from Gleevec for which she takes Zofran to good effect.  She requests refill.  She is interested in the possibility of coming off of therapy.  I will for her to [facility] bone marrow transplant service for that discussion.  Otherwise I will plan to see her back in 6 months with repeat lab work including CBC, CMP, BCR/ABL.

## 2021-07-15 NOTE — PROGRESS NOTES
Chief Complaint  cml and Follow-up    Pepe Aguilar MD Pike, Samuel Phillip, MD    Subjective          Sailaja Mercer presents to [facility] for ongoing treatment of her CML, chronic phase.  She is on Gleevec.  Tolerating well.  She takes her medication daily.  She does report some nausea but easily relieved with 1 dose of Zofran in the morning.  She denies fever, chills, weight loss, night sweats.  She reports excellent appetite and energy level.  She denies swelling.  She denies diarrhea.    Oncology/Hematology History Overview Note   CML-dx June 2012            Clinical Staging      CHRONIC PHASE CML            Treatments      Chemotherapy      Began Gleevec Summer 2012        Cancer (CMS/HCC) (Resolved)   6/28/2021 Initial Diagnosis    Cancer (CMS/HCC)     Leukemia (CMS/HCC) (Resolved)   6/28/2021 Initial Diagnosis    Leukemia (CMS/HCC)     CML (chronic myelocytic leukemia) (CMS/HCC)   7/15/2021 Initial Diagnosis    CML (chronic myelocytic leukemia) (CMS/HCC)     7/15/2021 Cancer Staged    Staging form: Chronic Myeloid Leukemia (Cml, AJCC 8th Edition  - Clinical: Ph+, Additional clonal changes: Absent - Signed by Sachin Walton MD on 7/15/2021      Chemotherapy    Gleevec initiated 2012         Review of Systems   Constitutional: Negative for appetite change, diaphoresis, fatigue, fever, unexpected weight gain and unexpected weight loss.   HENT: Negative for hearing loss, mouth sores, sore throat, swollen glands, trouble swallowing and voice change.    Eyes: Negative for blurred vision.   Respiratory: Negative for cough, shortness of breath and wheezing.    Cardiovascular: Negative for chest pain and palpitations.   Gastrointestinal: Negative for abdominal pain, blood in stool, constipation, diarrhea, nausea and vomiting.   Endocrine: Negative for cold intolerance and heat intolerance.   Genitourinary: Negative for difficulty urinating, dysuria, frequency,  hematuria and urinary incontinence.   Musculoskeletal: Negative for arthralgias, back pain and myalgias.   Skin: Negative for rash, skin lesions and bruise.   Neurological: Negative for dizziness, seizures, weakness, numbness and headache.   Hematological: Does not bruise/bleed easily.   Psychiatric/Behavioral: Negative for depressed mood. The patient is not nervous/anxious.      Current Outpatient Medications on File Prior to Visit   Medication Sig Dispense Refill   • acetaminophen (Tylenol) 325 MG tablet Take  by mouth.     • albuterol sulfate  (90 Base) MCG/ACT inhaler INHALE 1 TO 2 PUFFS BY MOUTH FOUR TIMES DAILY AS NEEDED     • brompheniramine-pseudoephedrine-DM 30-2-10 MG/5ML syrup TAKE 10 ML BY MOUTH EVERY 4 HOURS AS NEEDED     • buPROPion (WELLBUTRIN) 75 MG tablet Wellbutrin 75 mg oral tablet bid   Suspended     • cetirizine (ZyrTEC Allergy) 10 MG tablet Zyrtec 10 mg oral tablet take 2 tablets (20 mg) by oral route once daily   Active     • Cetirizine HCl 10 MG capsule Take  by mouth.     • doxylamine-pyridoxine (DICLEGIS) 10-10 MG tablet delayed-release EC tablet Take  by mouth.     • ibuprofen (ADVIL,MOTRIN) 200 MG tablet ibuprofen 200 mg oral tablet prn   Active     • imatinib (Gleevec) 100 MG chemo tablet Gleevec 100 mg oral tablet take 1 tablet by oral route 4 times a day   Active     • loratadine (Claritin) 10 MG tablet Claritin 10 mg oral tablet take 1 tablet (10 mg) by oral route once daily   Suspended     • Norethin-Eth Estrad-Fe Biphas (Lo Loestrin Fe) 1 MG-10 MCG / 10 MCG tablet Lo Loestrin Fe 1 mg-10 mcg (24)/10 mcg (2) oral tablet take 1 tablet by oral route once daily   Active     • ondansetron (Zofran) 4 MG tablet Zofran 4 mg oral tablet take 0.5 tablet by oral route daily   Active     • Prenatal Vit-Fe Fumarate-FA (PRENATAL PO) Take  by mouth.     • Prochlorperazine Maleate (COMPAZINE PO) Compazine oral prn   Suspended     • promethazine (PHENERGAN) 25 MG tablet Take 25 mg by mouth.      • amoxicillin-clavulanate (AUGMENTIN) 500-125 MG per tablet Take 1 tablet by mouth 2 (Two) Times a Day.     • amoxicillin-clavulanate (AUGMENTIN) 875-125 MG per tablet Take 1 tablet by mouth 2 (Two) Times a Day.       No current facility-administered medications on file prior to visit.       No Known Allergies  Past Medical History:   Diagnosis Date   • CML (chronic myelocytic leukemia) (CMS/HCC)    • CML (chronic myelocytic leukemia) (CMS/HCC) 7/15/2021     Past Surgical History:   Procedure Laterality Date   • APPENDECTOMY     • BREAST BIOPSY Bilateral      Social History     Socioeconomic History   • Marital status:      Spouse name: Not on file   • Number of children: Not on file   • Years of education: Not on file   • Highest education level: Not on file   Tobacco Use   • Smoking status: Never Smoker     Family History   Problem Relation Age of Onset   • Breast cancer Mother    • Breast cancer Maternal Grandmother    • Lung cancer Paternal Grandmother        Objective   Physical Exam  Vitals reviewed.   Constitutional:       General: She is not in acute distress.     Appearance: She is normal weight.   HENT:      Head: Normocephalic and atraumatic.   Cardiovascular:      Rate and Rhythm: Normal rate and regular rhythm.      Heart sounds: Normal heart sounds. No murmur heard.   No gallop.    Pulmonary:      Effort: Pulmonary effort is normal.      Breath sounds: Normal breath sounds.   Abdominal:      General: Abdomen is flat. Bowel sounds are normal. There is no distension.      Palpations: Abdomen is soft.      Tenderness: There is no abdominal tenderness.   Musculoskeletal:      Cervical back: Neck supple. No tenderness.      Right lower leg: No edema.      Left lower leg: No edema.   Neurological:      Mental Status: She is alert and oriented to person, place, and time.   Psychiatric:         Mood and Affect: Mood normal.         Behavior: Behavior normal.         Vitals:    07/15/21 0906   BP:  114/62   Pulse: 96   Resp: 14   Temp: 96.4 °F (35.8 °C)   SpO2: 100%   Weight: 52.8 kg (116 lb 6.5 oz)   PainSc: 0-No pain     ECOG score: 0         PHQ-9 Total Score: 0                  Result Review :   The following data was reviewed by: Sachin Walton MD on 07/15/2021:  Lab Results   Component Value Date    HGB 12.7 06/29/2021    HCT 37.1 06/29/2021    MCV 94.4 06/29/2021     06/29/2021    WBC 6.86 06/29/2021    NEUTROABS 3.35 06/29/2021    LYMPHSABS 3.02 06/29/2021    MONOSABS 0.43 06/29/2021    EOSABS 0.04 06/29/2021    BASOSABS 0.01 06/29/2021     Lab Results   Component Value Date    GLUCOSE 94 06/29/2021    BUN 13 06/29/2021    CREATININE 0.80 06/29/2021     06/29/2021    K 3.5 06/29/2021     06/29/2021    CO2 27.8 06/29/2021    CALCIUM 9.1 06/29/2021    PROTEINTOT 6.4 06/29/2021    ALBUMIN 4.52 06/29/2021    BILITOT 0.4 06/29/2021    ALKPHOS 53 06/29/2021    AST 11 06/29/2021    ALT 10 06/29/2021           Assessment and Plan    Diagnoses and all orders for this visit:    1. CML (chronic myelocytic leukemia) (CMS/[facility]) (Primary)  Assessment & Plan:  Patient has been on Gleevec since 2012.  Tolerating well.  Her BCR/ABL very good at 0.05.  Her blood counts look good.  She reports mild nausea from Gleevec for which she takes Zofran to good effect.  She requests refill.  She is interested in the possibility of coming off of therapy.  I will for her to [facility] bone marrow transplant service for that discussion.  Otherwise I will plan to see her back in 6 months with repeat lab work including CBC, CMP, BCR/ABL.    Orders:  -     CBC & Differential; Future  -     Comprehensive Metabolic Panel; Future  -     BCR-ABL1, CML / ALL, PCR, Quant; Future  -     Ambulatory Referral to Oncology    Other orders  -     Cancel: ondansetron ODT (Zofran ODT) 8 MG disintegrating tablet; Place 1 tablet on the tongue Every 8 (Eight) Hours As Needed for Nausea or Vomiting.  Dispense: 30 tablet;  Refill: 3          Patient Follow Up: 6 months      Patient was given instructions and counseling regarding her condition or for health maintenance advice. Please see specific information pulled into the AVS if appropriate.     Sachin Walton MD    7/15/2021

## 2021-07-30 RX ORDER — ONDANSETRON 4 MG/1
TABLET, FILM COATED ORAL
Status: CANCELLED | OUTPATIENT
Start: 2021-07-30

## 2021-08-02 DIAGNOSIS — R11.0 NAUSEA: Primary | ICD-10-CM

## 2021-08-02 RX ORDER — ONDANSETRON 4 MG/1
8 TABLET, FILM COATED ORAL EVERY 8 HOURS PRN
Qty: 30 TABLET | Refills: 3 | Status: SHIPPED | OUTPATIENT
Start: 2021-08-02 | End: 2022-05-31

## 2021-12-08 ENCOUNTER — DOCUMENTATION (OUTPATIENT)
Dept: MAMMOGRAPHY | Facility: HOSPITAL | Age: 36
End: 2021-12-08

## 2021-12-08 ENCOUNTER — TELEPHONE (OUTPATIENT)
Dept: OBSTETRICS AND GYNECOLOGY | Facility: CLINIC | Age: 36
End: 2021-12-08

## 2021-12-08 DIAGNOSIS — R92.8 ABNORMAL FINDINGS ON DIAGNOSTIC IMAGING OF BREAST: Primary | ICD-10-CM

## 2021-12-08 NOTE — TELEPHONE ENCOUNTER
Patient had an MRI guided breast biopsy 4/29/21 and they recommended a 6 mos follow up breast MRI which has not been done. Patient saw you for WWE on 5/25/21  Last. Please sign attached order.   Thank you.

## 2021-12-28 ENCOUNTER — LAB (OUTPATIENT)
Dept: LAB | Facility: HOSPITAL | Age: 36
End: 2021-12-28

## 2021-12-28 ENCOUNTER — HOSPITAL ENCOUNTER (OUTPATIENT)
Dept: MRI IMAGING | Facility: HOSPITAL | Age: 36
Discharge: HOME | End: 2021-12-28

## 2021-12-28 DIAGNOSIS — R92.8 ABNORMAL FINDINGS ON DIAGNOSTIC IMAGING OF BREAST: ICD-10-CM

## 2021-12-28 DIAGNOSIS — C92.10 CML (CHRONIC MYELOCYTIC LEUKEMIA): ICD-10-CM

## 2021-12-28 LAB
ALBUMIN SERPL-MCNC: 4.6 G/DL (ref 3.5–5.2)
ALBUMIN/GLOB SERPL: 1.8 G/DL
ALP SERPL-CCNC: 54 U/L (ref 39–117)
ALT SERPL W P-5'-P-CCNC: 12 U/L (ref 1–33)
ANION GAP SERPL CALCULATED.3IONS-SCNC: 11.4 MMOL/L (ref 5–15)
AST SERPL-CCNC: 13 U/L (ref 1–32)
BASOPHILS # BLD AUTO: 0.01 10*3/MM3 (ref 0–0.2)
BASOPHILS NFR BLD AUTO: 0.2 % (ref 0–1.5)
BILIRUB SERPL-MCNC: 0.6 MG/DL (ref 0–1.2)
BUN SERPL-MCNC: 12 MG/DL (ref 6–20)
BUN/CREAT SERPL: 13.6 (ref 7–25)
CALCIUM SPEC-SCNC: 9.3 MG/DL (ref 8.6–10.5)
CHLORIDE SERPL-SCNC: 101 MMOL/L (ref 98–107)
CO2 SERPL-SCNC: 26.6 MMOL/L (ref 22–29)
CREAT SERPL-MCNC: 0.88 MG/DL (ref 0.57–1)
DEPRECATED RDW RBC AUTO: 42.6 FL (ref 37–54)
EOSINOPHIL # BLD AUTO: 0.03 10*3/MM3 (ref 0–0.4)
EOSINOPHIL NFR BLD AUTO: 0.7 % (ref 0.3–6.2)
ERYTHROCYTE [DISTWIDTH] IN BLOOD BY AUTOMATED COUNT: 11.9 % (ref 12.3–15.4)
GFR SERPL CREATININE-BSD FRML MDRD: 73 ML/MIN/1.73
GLOBULIN UR ELPH-MCNC: 2.6 GM/DL
GLUCOSE SERPL-MCNC: 108 MG/DL (ref 65–99)
HCT VFR BLD AUTO: 42.1 % (ref 34–46.6)
HGB BLD-MCNC: 14.3 G/DL (ref 12–15.9)
IMM GRANULOCYTES # BLD AUTO: 0.01 10*3/MM3 (ref 0–0.05)
IMM GRANULOCYTES NFR BLD AUTO: 0.2 % (ref 0–0.5)
LYMPHOCYTES # BLD AUTO: 1.7 10*3/MM3 (ref 0.7–3.1)
LYMPHOCYTES NFR BLD AUTO: 39.6 % (ref 19.6–45.3)
MCH RBC QN AUTO: 32.9 PG (ref 26.6–33)
MCHC RBC AUTO-ENTMCNC: 34 G/DL (ref 31.5–35.7)
MCV RBC AUTO: 97 FL (ref 79–97)
MONOCYTES # BLD AUTO: 0.28 10*3/MM3 (ref 0.1–0.9)
MONOCYTES NFR BLD AUTO: 6.5 % (ref 5–12)
NEUTROPHILS NFR BLD AUTO: 2.26 10*3/MM3 (ref 1.7–7)
NEUTROPHILS NFR BLD AUTO: 52.8 % (ref 42.7–76)
NRBC BLD AUTO-RTO: 0 /100 WBC (ref 0–0.2)
PLATELET # BLD AUTO: 282 10*3/MM3 (ref 140–450)
PMV BLD AUTO: 10.4 FL (ref 6–12)
POTASSIUM SERPL-SCNC: 3.5 MMOL/L (ref 3.5–5.2)
PROT SERPL-MCNC: 7.2 G/DL (ref 6–8.5)
RBC # BLD AUTO: 4.34 10*6/MM3 (ref 3.77–5.28)
SODIUM SERPL-SCNC: 139 MMOL/L (ref 136–145)
WBC NRBC COR # BLD: 4.29 10*3/MM3 (ref 3.4–10.8)

## 2021-12-28 PROCEDURE — 85025 COMPLETE CBC W/AUTO DIFF WBC: CPT

## 2021-12-28 PROCEDURE — 77049 MRI BREAST C-+ W/CAD BI: CPT

## 2021-12-28 PROCEDURE — A9577 INJ MULTIHANCE: HCPCS | Performed by: OBSTETRICS & GYNECOLOGY

## 2021-12-28 PROCEDURE — 36415 COLL VENOUS BLD VENIPUNCTURE: CPT

## 2021-12-28 PROCEDURE — 0 GADOBENATE DIMEGLUMINE 529 MG/ML SOLUTION: Performed by: OBSTETRICS & GYNECOLOGY

## 2021-12-28 PROCEDURE — 80053 COMPREHEN METABOLIC PANEL: CPT

## 2021-12-28 RX ADMIN — GADOBENATE DIMEGLUMINE 15 ML: 529 INJECTION, SOLUTION INTRAVENOUS at 10:17

## 2022-01-03 LAB — REF LAB TEST METHOD: NORMAL

## 2022-01-06 DIAGNOSIS — C92.10 CML (CHRONIC MYELOCYTIC LEUKEMIA): Primary | ICD-10-CM

## 2022-01-06 RX ORDER — IMATINIB MESYLATE 400 MG/1
400 TABLET, FILM COATED ORAL DAILY
Qty: 30 TABLET | Refills: 5 | Status: SHIPPED | OUTPATIENT
Start: 2022-01-06 | End: 2022-01-11 | Stop reason: SDUPTHER

## 2022-01-11 DIAGNOSIS — C92.10 CML (CHRONIC MYELOCYTIC LEUKEMIA): ICD-10-CM

## 2022-01-11 RX ORDER — IMATINIB MESYLATE 400 MG/1
400 TABLET, FILM COATED ORAL DAILY
Qty: 30 TABLET | Refills: 5 | Status: SHIPPED | OUTPATIENT
Start: 2022-01-11 | End: 2022-01-18 | Stop reason: SDUPTHER

## 2022-01-11 NOTE — TELEPHONE ENCOUNTER
Caller: MARANDA     Relationship: [facility]    Best call back number: [phone]    Requested Prescriptions:   Requested Prescriptions     Pending Prescriptions Disp Refills   • imatinib (Gleevec) 400 MG chemo tablet 30 tablet 5     Sig: Take 1 tablet by mouth Daily.        Pharmacy where request should be sent: [facility] - [place], [place] - [address] - [phone]  - [phone] FX   Additional details provided by patient: PT IS OUT OF THE MEDICATION    Does the patient have less than a 3 day supply:  [x] Yes  [] No

## 2022-01-17 ENCOUNTER — TELEPHONE (OUTPATIENT)
Dept: ONCOLOGY | Facility: HOSPITAL | Age: 37
End: 2022-01-17

## 2022-01-17 NOTE — TELEPHONE ENCOUNTER
Caller: Sailaja Mercer    Relationship: Self    Best call back number: [phone]    What is the best time to reach you: ASAP    Who are you requesting to speak with (clinical staff, provider,  specific staff member): NURSE    Do you know the name of the person who called:     What was the call regarding: PT SAYS RX WAS FOR IMATINIB BUT SHE NEEDS NAME BRAND GLEEVEC    Do you require a callback: YES

## 2022-01-18 DIAGNOSIS — C92.10 CML (CHRONIC MYELOCYTIC LEUKEMIA): ICD-10-CM

## 2022-01-18 RX ORDER — IMATINIB MESYLATE 400 MG/1
400 TABLET, FILM COATED ORAL DAILY
Qty: 30 TABLET | Refills: 5 | Status: SHIPPED | OUTPATIENT
Start: 2022-01-18 | End: 2022-10-21

## 2022-01-25 ENCOUNTER — TELEPHONE (OUTPATIENT)
Dept: ONCOLOGY | Facility: OTHER | Age: 37
End: 2022-01-25

## 2022-01-25 ENCOUNTER — APPOINTMENT (OUTPATIENT)
Dept: ONCOLOGY | Facility: HOSPITAL | Age: 37
End: 2022-01-25

## 2022-01-25 NOTE — TELEPHONE ENCOUNTER
PT CALLED TO SEE IF HER APPT FOR TODAY COULD BE PUSHED BACK LATER OR R/S. W/T PT TO OFFICE TO FURTHER ASSIST.

## 2022-02-15 ENCOUNTER — TELEPHONE (OUTPATIENT)
Dept: ONCOLOGY | Facility: HOSPITAL | Age: 37
End: 2022-02-15

## 2022-02-15 NOTE — TELEPHONE ENCOUNTER
Pharmacy Name:  [facility]     Pharmacy representative name: RACHEAL     Pharmacy representative phone number: [phone]    What medication are you calling in regards to: GLEEVEC     What question does the pharmacy have: PHARMACY IS REQUESTING MOST RECENT LAB WORK.  PLEASE FAX TO: [phone]    Who is the provider that prescribed the medication: LILA     Additional notes: PLEASE CALL RACHEAL WITH ANY QUESTIONS

## 2022-02-18 RX ORDER — TRIAMCINOLONE ACETONIDE 55 UG/1
2 SPRAY, METERED NASAL DAILY
COMMUNITY
Start: 2021-07-21 | End: 2022-11-06

## 2022-02-18 RX ORDER — FAMOTIDINE 20 MG/1
TABLET, FILM COATED ORAL
COMMUNITY
Start: 2021-07-21 | End: 2022-06-01

## 2022-02-18 RX ORDER — LEVOCETIRIZINE DIHYDROCHLORIDE 5 MG/1
5 TABLET, FILM COATED ORAL DAILY
COMMUNITY
Start: 2021-07-21 | End: 2022-06-01

## 2022-02-18 RX ORDER — MONTELUKAST SODIUM 10 MG/1
10 TABLET ORAL
COMMUNITY
Start: 2022-01-18 | End: 2022-11-06

## 2022-02-21 ENCOUNTER — OFFICE VISIT (OUTPATIENT)
Dept: ONCOLOGY | Facility: HOSPITAL | Age: 37
End: 2022-02-21

## 2022-02-21 VITALS
SYSTOLIC BLOOD PRESSURE: 116 MMHG | OXYGEN SATURATION: 100 % | TEMPERATURE: 98.6 F | DIASTOLIC BLOOD PRESSURE: 67 MMHG | BODY MASS INDEX: 22.39 KG/M2 | HEART RATE: 83 BPM | RESPIRATION RATE: 16 BRPM | WEIGHT: 114.64 LBS

## 2022-02-21 DIAGNOSIS — C92.10 CML (CHRONIC MYELOCYTIC LEUKEMIA): Primary | ICD-10-CM

## 2022-02-21 PROCEDURE — 99214 OFFICE O/P EST MOD 30 MIN: CPT | Performed by: INTERNAL MEDICINE

## 2022-02-21 PROCEDURE — G0463 HOSPITAL OUTPT CLINIC VISIT: HCPCS

## 2022-02-21 PROCEDURE — G0463 HOSPITAL OUTPT CLINIC VISIT: HCPCS | Performed by: INTERNAL MEDICINE

## 2022-02-21 NOTE — PROGRESS NOTES
Chief Complaint  cml and Follow-up    Pepe Aguilar MD  Provider, No Known    Subjective          Sailaja Mercer presents to [facility] for ongoing treatment of her CML.  She is on Gleevec started in 2012.  She is compliant with her medication, taking every day.  She denies any side effects or issues from it.  She denies fever, chills, illness, night sweats or adenopathy.  She notes good energy level.  She has good appetite and her weight is maintained.  She denies excessive bruising or bleeding.  She denies any fluid retention or swelling.    Oncology/Hematology History Overview Note   CML-dx June 2012            Clinical Staging      CHRONIC PHASE CML            Treatments      Chemotherapy      Began Gleevec Summer 2012        Cancer (HCC) (Resolved)   6/28/2021 Initial Diagnosis    Cancer (CMS/HCC)     Leukemia (HCC) (Resolved)   6/28/2021 Initial Diagnosis    Leukemia (CMS/HCC)     CML (chronic myelocytic leukemia) (HCC)   7/15/2021 Initial Diagnosis    CML (chronic myelocytic leukemia) (CMS/HCC)     7/15/2021 Cancer Staged    Staging form: Chronic Myeloid Leukemia (Cml, AJCC 8th Edition  - Clinical: Ph+, Additional clonal changes: Absent - Signed by Sachin Walton MD on 7/15/2021      Chemotherapy    Gleevec initiated 2012         Review of Systems   Constitutional: Negative for appetite change, diaphoresis, fatigue, fever, unexpected weight gain and unexpected weight loss.   HENT: Negative for hearing loss, mouth sores, sore throat, swollen glands, trouble swallowing and voice change.    Eyes: Negative for blurred vision.   Respiratory: Negative for cough, shortness of breath and wheezing.    Cardiovascular: Negative for chest pain and palpitations.   Gastrointestinal: Negative for abdominal pain, blood in stool, constipation, diarrhea, nausea and vomiting.   Endocrine: Negative for cold intolerance and heat intolerance.   Genitourinary: Negative for  difficulty urinating, dysuria, frequency, hematuria and urinary incontinence.   Musculoskeletal: Negative for arthralgias, back pain and myalgias.   Skin: Negative for rash, skin lesions and wound.   Neurological: Negative for dizziness, seizures, weakness, numbness and headache.   Hematological: Does not bruise/bleed easily.   Psychiatric/Behavioral: Negative for depressed mood. The patient is not nervous/anxious.      Current Outpatient Medications on File Prior to Visit   Medication Sig Dispense Refill   • acetaminophen (Tylenol) 325 MG tablet Take  by mouth.     • albuterol sulfate  (90 Base) MCG/ACT inhaler INHALE 1 TO 2 PUFFS BY MOUTH FOUR TIMES DAILY AS NEEDED     • buPROPion (WELLBUTRIN) 75 MG tablet Wellbutrin 75 mg oral tablet bid   Suspended     • cetirizine (ZyrTEC Allergy) 10 MG tablet Zyrtec 10 mg oral tablet take 2 tablets (20 mg) by oral route once daily   Active     • doxylamine-pyridoxine (DICLEGIS) 10-10 MG tablet delayed-release EC tablet Take  by mouth.     • famotidine (PEPCID) 20 MG tablet TAKE 1 TABLET BY MOUTH 2 HOURS PRIOR TO CLUSTER THERAPY     • ibuprofen (ADVIL,MOTRIN) 200 MG tablet ibuprofen 200 mg oral tablet prn   Active     • imatinib (Gleevec) 400 MG chemo tablet Take 1 tablet by mouth Daily. 30 tablet 5   • levocetirizine (XYZAL) 5 MG tablet Take 5 mg by mouth Daily.     • loratadine (Claritin) 10 MG tablet Claritin 10 mg oral tablet take 1 tablet (10 mg) by oral route once daily   Suspended     • montelukast (SINGULAIR) 10 MG tablet Take 10 mg by mouth every night at bedtime.     • Norethin-Eth Estrad-Fe Biphas (Lo Loestrin Fe) 1 MG-10 MCG / 10 MCG tablet Lo Loestrin Fe 1 mg-10 mcg (24)/10 mcg (2) oral tablet take 1 tablet by oral route once daily   Active     • ondansetron (Zofran) 4 MG tablet Take 2 tablets by mouth Every 8 (Eight) Hours As Needed for Nausea or Vomiting. 30 tablet 3   • Prenatal Vit-Fe Fumarate-FA (PRENATAL PO) Take  by mouth.     • Prochlorperazine  Maleate (COMPAZINE PO) Compazine oral prn   Suspended     • promethazine (PHENERGAN) 25 MG tablet Take 25 mg by mouth.     • Triamcinolone Acetonide (Nasacort Allergy 24HR) 55 MCG/ACT nasal inhaler 2 sprays into the nostril(s) as directed by provider Daily.     • [DISCONTINUED] amoxicillin-clavulanate (AUGMENTIN) 500-125 MG per tablet Take 1 tablet by mouth 2 (Two) Times a Day.     • [DISCONTINUED] brompheniramine-pseudoephedrine-DM 30-2-10 MG/5ML syrup TAKE 10 ML BY MOUTH EVERY 4 HOURS AS NEEDED       No current facility-administered medications on file prior to visit.       No Known Allergies  Past Medical History:   Diagnosis Date   • Anxiety    • Cancer (HCC)    • CML (chronic myelocytic leukemia) (HCC)     2+ years ago   • CML (chronic myelocytic leukemia) (HCC) 7/15/2021     Past Surgical History:   Procedure Laterality Date   • APPENDECTOMY  06/30/2012   • BREAST BIOPSY Bilateral    • COLONOSCOPY  2015   • ENDOSCOPY  2015     Social History     Socioeconomic History   • Marital status:    Tobacco Use   • Smoking status: Never Smoker   Substance and Sexual Activity   • Alcohol use: Never     Comment: Current some day   • Drug use: Never     Family History   Problem Relation Age of Onset   • Breast cancer Mother    • Cancer Mother         Unspecified   • Breast cancer Maternal Grandmother    • Lung cancer Paternal Grandmother        Objective   Physical Exam  Vitals reviewed. Exam conducted with a chaperone present.   Constitutional:       General: She is not in acute distress.     Appearance: Normal appearance.   Cardiovascular:      Rate and Rhythm: Normal rate and regular rhythm.      Heart sounds: Normal heart sounds. No murmur heard.  No gallop.    Pulmonary:      Effort: Pulmonary effort is normal.      Breath sounds: Normal breath sounds.   Abdominal:      General: Abdomen is flat. Bowel sounds are normal. There is no distension.      Palpations: Abdomen is soft.      Tenderness: There is no  abdominal tenderness.   Musculoskeletal:      Cervical back: Neck supple.      Right lower leg: No edema.      Left lower leg: No edema.   Lymphadenopathy:      Cervical: No cervical adenopathy.   Neurological:      Mental Status: She is alert and oriented to person, place, and time.   Psychiatric:         Mood and Affect: Mood normal.         Behavior: Behavior normal.         Vitals:    02/21/22 1408   BP: 116/67   Pulse: 83   Resp: 16   Temp: 98.6 °F (37 °C)   SpO2: 100%   Weight: 52 kg (114 lb 10.2 oz)   PainSc: 0-No pain     ECOG score: 0         PHQ-9 Total Score: 0                  Result Review :   The following data was reviewed by: Sachin Walton MD on 02/21/2022:  Lab Results   Component Value Date    HGB 14.3 12/28/2021    HCT 42.1 12/28/2021    MCV 97.0 12/28/2021     12/28/2021    WBC 4.29 12/28/2021    NEUTROABS 2.26 12/28/2021    LYMPHSABS 1.70 12/28/2021    MONOSABS 0.28 12/28/2021    EOSABS 0.03 12/28/2021    BASOSABS 0.01 12/28/2021     Lab Results   Component Value Date    GLUCOSE 108 (H) 12/28/2021    BUN 12 12/28/2021    CREATININE 0.88 12/28/2021     12/28/2021    K 3.5 12/28/2021     12/28/2021    CO2 26.6 12/28/2021    CALCIUM 9.3 12/28/2021    PROTEINTOT 7.2 12/28/2021    ALBUMIN 4.60 12/28/2021    BILITOT 0.6 12/28/2021    ALKPHOS 54 12/28/2021    AST 13 12/28/2021    ALT 12 12/28/2021     BCR/ABL PCR 0.06%          Assessment and Plan    Diagnoses and all orders for this visit:    1. CML (chronic myelocytic leukemia) (HCC) (Primary)  Assessment & Plan:  Chronic phase.  Patient is on Gleevec.  Her most recent PCR for BCR/ABL demonstrates 0.06% consistent with a major molecular response.  CBC is normal.  Tolerating Gleevec well.  We discussed possibly switching to a 2nd generation TKI but she is comfortable with how she feels on Gleevec and is not interested in switching at this point.    Orders:  -     BCR-ABL1, CML / ALL, PCR, Quant; Future  -     CBC & Differential;  Future  -     Comprehensive Metabolic Panel; Future          Patient Follow Up: June 2022    Patient was given instructions and counseling regarding her condition or for health maintenance advice. Please see specific information pulled into the AVS if appropriate.     Sachin Walton MD    2/21/2022

## 2022-03-28 ENCOUNTER — TELEPHONE (OUTPATIENT)
Dept: OBSTETRICS AND GYNECOLOGY | Facility: CLINIC | Age: 37
End: 2022-03-28

## 2022-03-28 RX ORDER — NORETHINDRONE ACETATE AND ETHINYL ESTRADIOL, ETHINYL ESTRADIOL AND FERROUS FUMARATE 1MG-10(24)
1 KIT ORAL DAILY
Qty: 28 TABLET | Refills: 2 | Status: SHIPPED | OUTPATIENT
Start: 2022-03-28 | End: 2022-06-01

## 2022-04-19 ENCOUNTER — APPOINTMENT (OUTPATIENT)
Dept: CT IMAGING | Facility: HOSPITAL | Age: 37
End: 2022-04-19

## 2022-04-19 ENCOUNTER — HOSPITAL ENCOUNTER (EMERGENCY)
Facility: HOSPITAL | Age: 37
Discharge: HOME | End: 2022-04-19
Attending: EMERGENCY MEDICINE | Admitting: EMERGENCY MEDICINE

## 2022-04-19 VITALS
RESPIRATION RATE: 18 BRPM | SYSTOLIC BLOOD PRESSURE: 138 MMHG | BODY MASS INDEX: 23.16 KG/M2 | HEIGHT: 60 IN | OXYGEN SATURATION: 100 % | HEART RATE: 95 BPM | WEIGHT: 117.95 LBS | TEMPERATURE: 99.4 F | DIASTOLIC BLOOD PRESSURE: 83 MMHG
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
## 2022-04-19 DIAGNOSIS — S19.9XXA INJURY OF NECK, INITIAL ENCOUNTER: ICD-10-CM

## 2022-04-19 PROCEDURE — 99283 EMERGENCY DEPT VISIT LOW MDM: CPT

## 2022-04-19 PROCEDURE — 70450 CT HEAD/BRAIN W/O DYE: CPT

## 2022-04-19 PROCEDURE — 72125 CT NECK SPINE W/O DYE: CPT

## 2022-04-19 RX ORDER — CYCLOBENZAPRINE HCL 10 MG
10 TABLET ORAL 2 TIMES DAILY PRN
Qty: 10 TABLET | Refills: 0 | Status: SHIPPED | OUTPATIENT
Start: 2022-04-19 | End: 2022-06-01

## 2022-04-19 RX ORDER — ACETAMINOPHEN 325 MG/1
975 TABLET ORAL ONCE
Status: COMPLETED | OUTPATIENT
Start: 2022-04-19 | End: 2022-04-19

## 2022-04-19 RX ADMIN — ACETAMINOPHEN 975 MG: 325 TABLET ORAL at 11:28
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
## 2022-05-30 DIAGNOSIS — R11.0 NAUSEA: ICD-10-CM

## 2022-05-31 RX ORDER — ONDANSETRON 4 MG/1
TABLET, FILM COATED ORAL
Qty: 30 TABLET | Refills: 3 | Status: SHIPPED | OUTPATIENT
Start: 2022-05-31 | End: 2022-08-01 | Stop reason: SDUPTHER

## 2022-06-01 ENCOUNTER — OFFICE VISIT (OUTPATIENT)
Dept: OBSTETRICS AND GYNECOLOGY | Facility: CLINIC | Age: 37
End: 2022-06-01

## 2022-06-01 VITALS
DIASTOLIC BLOOD PRESSURE: 69 MMHG | SYSTOLIC BLOOD PRESSURE: 104 MMHG | BODY MASS INDEX: 23.09 KG/M2 | HEIGHT: 60 IN | WEIGHT: 117.6 LBS | HEART RATE: 80 BPM

## 2022-06-01 DIAGNOSIS — Z01.419 WOMEN'S ANNUAL ROUTINE GYNECOLOGICAL EXAMINATION: Primary | ICD-10-CM

## 2022-06-01 DIAGNOSIS — Z30.09 BIRTH CONTROL COUNSELING: ICD-10-CM

## 2022-06-01 DIAGNOSIS — Z91.89 INCREASED RISK OF BREAST CANCER: ICD-10-CM

## 2022-06-01 PROCEDURE — G0123 SCREEN CERV/VAG THIN LAYER: HCPCS | Performed by: OBSTETRICS & GYNECOLOGY

## 2022-06-01 PROCEDURE — 99395 PREV VISIT EST AGE 18-39: CPT | Performed by: OBSTETRICS & GYNECOLOGY

## 2022-06-01 RX ORDER — NORETHINDRONE ACETATE AND ETHINYL ESTRADIOL, ETHINYL ESTRADIOL AND FERROUS FUMARATE 1MG-10(24)
1 KIT ORAL DAILY
Qty: 28 TABLET | Refills: 11 | Status: SHIPPED | OUTPATIENT
Start: 2022-06-01 | End: 2022-06-27

## 2022-06-01 NOTE — PROGRESS NOTES
"Well Woman Visit    CC: DENIA      HPI:   37 y.o. who presents for a well woman exam. She has no complaints. Menses is very light with lo lo estrin. Desires to stay on this OCP.  No other concerns or problems.  The patient reports that her mother and 2 of her mother's sisters all have breast cancer.  The earliest was at 42.      History: PMHx, Meds, Allergies, PSHx, Social Hx, and POBHx all reviewed and updated.    /69   Pulse 80   Ht 152.4 cm (60\")   Wt 53.3 kg (117 lb 9.6 oz)   LMP  (LMP Unknown) Comment: 2 MONTHS AGO  Breastfeeding No   BMI 22.97 kg/m²     Physical Exam  Vitals and nursing note reviewed. Exam conducted with a chaperone present.   Constitutional:       General: She is not in acute distress.     Appearance: Normal appearance. She is not ill-appearing.   HENT:      Head: Normocephalic and atraumatic.   Eyes:      Extraocular Movements: Extraocular movements intact.   Neck:      Thyroid: No thyroid mass or thyromegaly.   Pulmonary:      Effort: Pulmonary effort is normal.   Chest:   Breasts: Breasts are symmetrical.      Right: Normal. No swelling, bleeding, inverted nipple, mass, nipple discharge, skin change, tenderness, axillary adenopathy or supraclavicular adenopathy.      Left: Normal. No swelling, bleeding, inverted nipple, mass, nipple discharge, skin change, tenderness, axillary adenopathy or supraclavicular adenopathy.       Abdominal:      General: There is no distension.      Palpations: Abdomen is soft. There is no mass.      Tenderness: There is no abdominal tenderness. There is no guarding or rebound.      Hernia: There is no hernia in the left inguinal area or right inguinal area.   Genitourinary:     General: Normal vulva.      Exam position: Lithotomy position.      Pubic Area: No rash.       Labia:         Right: No rash, tenderness, lesion or injury.         Left: No rash, tenderness, lesion or injury.       Urethra: No prolapse, urethral pain, urethral swelling " or urethral lesion.      Vagina: Normal. No signs of injury. No vaginal discharge, tenderness or prolapsed vaginal walls.      Cervix: Normal.      Uterus: Normal.       Adnexa: Right adnexa normal and left adnexa normal.   Lymphadenopathy:      Upper Body:      Right upper body: No supraclavicular or axillary adenopathy.      Left upper body: No supraclavicular or axillary adenopathy.   Skin:     General: Skin is warm and dry.   Neurological:      Mental Status: She is alert and oriented to person, place, and time.   Psychiatric:         Mood and Affect: Mood normal.         Behavior: Behavior normal.         Thought Content: Thought content normal.       Lifetime risk of breast cancer by breast cancer risk assessment tool is greater than 20%    ASSESSMENT AND PLAN:    Diagnoses and all orders for this visit:    1. Women's annual routine gynecological examination (Primary)  Assessment & Plan:  Pap  Multivitamin folic acid    Orders:  -     IGP,rfx Aptima HPV All Pth    2. Birth control counseling  Assessment & Plan:  The risks, benefits, alternatives of the model contraceptives been discussed including the risk of VTE.  Patient desires to stay on lo Loestrin.    Orders:  -     Norethin-Eth Estrad-Fe Biphas (Lo Loestrin Fe) 1 MG-10 MCG / 10 MCG tablet; Take 1 tablet by mouth Daily.  Dispense: 28 tablet; Refill: 11    3. Increased risk of breast cancer  Overview:  Lifetime risk greater than 20%  Patient's mother and 2 maternal aunts with breast cancer.  Earliest at age 42  The patient and her mother have had genetic testing and are negative    Assessment & Plan:  Recommend both mammogram and MRI yearly    Orders:  -     Mammo Screening Digital Tomosynthesis Bilateral With CAD; Future  -     MRI Breast Bilateral With & Without Contrast; Future      Counseling:     All BC Options R/B/A/SE/E of each reviewed  OCP/Hormone use risk JANA  Track menses, RTO IF <q21d, >7d long, or heavy    Preventative:   MMG  Breast MRI  s/p  FLU vaccine this season  s/p COVID vaccine    She understands the importance of having any ordered tests to be performed in a timely fashion.  The risks of not performing them include, but are not limited to, advanced cancer stages, bone loss from osteoporosis and/or subsequent increase in morbidity and/or mortality.  She is encouraged to review her results online and/or contact or office if she has questions.     Follow Up:  Return for Annual physical.    Santana Holt MD  06/01/2022

## 2022-06-02 PROBLEM — Z30.09 BIRTH CONTROL COUNSELING: Status: ACTIVE | Noted: 2022-06-02

## 2022-06-02 PROBLEM — Z91.89 INCREASED RISK OF BREAST CANCER: Status: ACTIVE | Noted: 2022-06-02

## 2022-06-03 NOTE — ASSESSMENT & PLAN NOTE
The risks, benefits, alternatives of the model contraceptives been discussed including the risk of VTE.  Patient desires to stay on lo Loestrin.

## 2022-06-06 LAB
CONV .: NORMAL
CYTOLOGIST CVX/VAG CYTO: NORMAL
CYTOLOGY CVX/VAG DOC CYTO: NORMAL
CYTOLOGY CVX/VAG DOC THIN PREP: NORMAL
DX ICD CODE: NORMAL
HIV 1 & 2 AB SER-IMP: NORMAL
OTHER STN SPEC: NORMAL
STAT OF ADQ CVX/VAG CYTO-IMP: NORMAL

## 2022-06-26 DIAGNOSIS — Z30.09 BIRTH CONTROL COUNSELING: ICD-10-CM

## 2022-06-27 ENCOUNTER — TELEPHONE (OUTPATIENT)
Dept: ONCOLOGY | Facility: HOSPITAL | Age: 37
End: 2022-06-27

## 2022-06-27 RX ORDER — NORETHINDRONE ACETATE AND ETHINYL ESTRADIOL, ETHINYL ESTRADIOL AND FERROUS FUMARATE 1MG-10(24)
1 KIT ORAL DAILY
Qty: 28 TABLET | Refills: 11 | OUTPATIENT
Start: 2022-06-27

## 2022-06-27 RX ORDER — NORETHINDRONE ACETATE AND ETHINYL ESTRADIOL, ETHINYL ESTRADIOL AND FERROUS FUMARATE 1MG-10(24)
1 KIT ORAL DAILY
Qty: 28 TABLET | Refills: 11 | Status: SHIPPED | OUTPATIENT
Start: 2022-06-27 | End: 2022-08-01 | Stop reason: SDUPTHER

## 2022-06-27 RX ORDER — NORETHINDRONE ACETATE AND ETHINYL ESTRADIOL, ETHINYL ESTRADIOL AND FERROUS FUMARATE 1MG-10(24)
1 KIT ORAL DAILY
Qty: 28 TABLET | Refills: 11 | Status: SHIPPED | OUTPATIENT
Start: 2022-06-27 | End: 2023-04-04 | Stop reason: SDUPTHER

## 2022-06-27 NOTE — TELEPHONE ENCOUNTER
Caller: Sailaja Mercer    Relationship to patient: Self    Best call back number: [phone]    Chief complaint: PATIENT CURRENTLY SCHEDULED 7/13/22. PATIENT REQUESTING TO RESCHEDULE    Type of visit: FU    Requested date: NEXT AVAILABLE

## 2022-07-20 ENCOUNTER — LAB (OUTPATIENT)
Dept: LAB | Facility: HOSPITAL | Age: 37
End: 2022-07-20

## 2022-07-20 DIAGNOSIS — C92.10 CML (CHRONIC MYELOCYTIC LEUKEMIA): ICD-10-CM

## 2022-07-20 LAB
ALBUMIN SERPL-MCNC: 4.9 G/DL (ref 3.5–5.2)
ALBUMIN/GLOB SERPL: 2.5 G/DL
ALP SERPL-CCNC: 49 U/L (ref 39–117)
ALT SERPL W P-5'-P-CCNC: 11 U/L (ref 1–33)
ANION GAP SERPL CALCULATED.3IONS-SCNC: 10 MMOL/L (ref 5–15)
AST SERPL-CCNC: 11 U/L (ref 1–32)
BASOPHILS # BLD AUTO: 0.02 10*3/MM3 (ref 0–0.2)
BASOPHILS NFR BLD AUTO: 0.3 % (ref 0–1.5)
BILIRUB SERPL-MCNC: 0.6 MG/DL (ref 0–1.2)
BUN SERPL-MCNC: 10 MG/DL (ref 6–20)
BUN/CREAT SERPL: 12.7 (ref 7–25)
CALCIUM SPEC-SCNC: 9.6 MG/DL (ref 8.6–10.5)
CHLORIDE SERPL-SCNC: 103 MMOL/L (ref 98–107)
CO2 SERPL-SCNC: 27 MMOL/L (ref 22–29)
CREAT SERPL-MCNC: 0.79 MG/DL (ref 0.57–1)
DEPRECATED RDW RBC AUTO: 44.4 FL (ref 37–54)
EGFRCR SERPLBLD CKD-EPI 2021: 99.6 ML/MIN/1.73
EOSINOPHIL # BLD AUTO: 0.02 10*3/MM3 (ref 0–0.4)
EOSINOPHIL NFR BLD AUTO: 0.3 % (ref 0.3–6.2)
ERYTHROCYTE [DISTWIDTH] IN BLOOD BY AUTOMATED COUNT: 12.4 % (ref 12.3–15.4)
GLOBULIN UR ELPH-MCNC: 2 GM/DL
GLUCOSE SERPL-MCNC: 78 MG/DL (ref 65–99)
HCT VFR BLD AUTO: 39 % (ref 34–46.6)
HGB BLD-MCNC: 13.3 G/DL (ref 12–15.9)
IMM GRANULOCYTES # BLD AUTO: 0.02 10*3/MM3 (ref 0–0.05)
IMM GRANULOCYTES NFR BLD AUTO: 0.3 % (ref 0–0.5)
LYMPHOCYTES # BLD AUTO: 2.25 10*3/MM3 (ref 0.7–3.1)
LYMPHOCYTES NFR BLD AUTO: 33.1 % (ref 19.6–45.3)
MCH RBC QN AUTO: 33.6 PG (ref 26.6–33)
MCHC RBC AUTO-ENTMCNC: 34.1 G/DL (ref 31.5–35.7)
MCV RBC AUTO: 98.5 FL (ref 79–97)
MONOCYTES # BLD AUTO: 0.52 10*3/MM3 (ref 0.1–0.9)
MONOCYTES NFR BLD AUTO: 7.7 % (ref 5–12)
NEUTROPHILS NFR BLD AUTO: 3.96 10*3/MM3 (ref 1.7–7)
NEUTROPHILS NFR BLD AUTO: 58.3 % (ref 42.7–76)
NRBC BLD AUTO-RTO: 0 /100 WBC (ref 0–0.2)
PLATELET # BLD AUTO: 257 10*3/MM3 (ref 140–450)
PMV BLD AUTO: 10.6 FL (ref 6–12)
POTASSIUM SERPL-SCNC: 3.4 MMOL/L (ref 3.5–5.2)
PROT SERPL-MCNC: 6.9 G/DL (ref 6–8.5)
RBC # BLD AUTO: 3.96 10*6/MM3 (ref 3.77–5.28)
SODIUM SERPL-SCNC: 140 MMOL/L (ref 136–145)
WBC NRBC COR # BLD: 6.79 10*3/MM3 (ref 3.4–10.8)

## 2022-07-20 PROCEDURE — 36415 COLL VENOUS BLD VENIPUNCTURE: CPT

## 2022-07-20 PROCEDURE — 80053 COMPREHEN METABOLIC PANEL: CPT

## 2022-07-20 PROCEDURE — 85025 COMPLETE CBC W/AUTO DIFF WBC: CPT

## 2022-07-25 LAB — REF LAB TEST METHOD: NORMAL

## 2022-08-01 ENCOUNTER — OFFICE VISIT (OUTPATIENT)
Dept: ONCOLOGY | Facility: HOSPITAL | Age: 37
End: 2022-08-01

## 2022-08-01 VITALS
RESPIRATION RATE: 16 BRPM | BODY MASS INDEX: 22.91 KG/M2 | SYSTOLIC BLOOD PRESSURE: 133 MMHG | WEIGHT: 117.28 LBS | OXYGEN SATURATION: 100 % | DIASTOLIC BLOOD PRESSURE: 80 MMHG | HEART RATE: 92 BPM | TEMPERATURE: 97.5 F

## 2022-08-01 DIAGNOSIS — R11.0 NAUSEA: ICD-10-CM

## 2022-08-01 DIAGNOSIS — C92.10 CML (CHRONIC MYELOCYTIC LEUKEMIA): ICD-10-CM

## 2022-08-01 PROCEDURE — G0463 HOSPITAL OUTPT CLINIC VISIT: HCPCS | Performed by: INTERNAL MEDICINE

## 2022-08-01 PROCEDURE — 99214 OFFICE O/P EST MOD 30 MIN: CPT | Performed by: INTERNAL MEDICINE

## 2022-08-01 RX ORDER — ONDANSETRON 4 MG/1
8 TABLET, FILM COATED ORAL EVERY 8 HOURS PRN
Qty: 30 TABLET | Refills: 3 | Status: SHIPPED | OUTPATIENT
Start: 2022-08-01

## 2022-08-01 RX ORDER — ERGOCALCIFEROL 1.25 MG/1
50000 CAPSULE ORAL
COMMUNITY
Start: 2022-07-06 | End: 2023-02-01

## 2022-08-01 RX ORDER — LANOLIN ALCOHOL/MO/W.PET/CERES
2000 CREAM (GRAM) TOPICAL DAILY
COMMUNITY
Start: 2022-07-06 | End: 2023-02-01

## 2022-08-01 NOTE — ASSESSMENT & PLAN NOTE
Chronic phase.  Patient has been on Gleevec since 2012.  PCR for BCR/ABL 0.03 consistent with major molecular response.  The other blood counts are normal.  She is doing well.  Gleevec refilled today.  Continue same.  I will see her back in 6 months for ongoing surveillance with lab work prior including PCR. Zofran refilled today as needed for nausea from [place].

## 2022-08-01 NOTE — PROGRESS NOTES
She hadChief Complaint  CML    Provider, No Known  Provider, No Known    Subjective          Sailaja Mercer presents to [facility] for ongoing treatment of her chronic phase CML.  She is on Gleevec since 2012.  She is taking medication as prescribed.  Occasional nausea, but Zofran helps.  She denies fever, chills, illness, night sweats or adenopathy.  She denies excessive bruising or bleeding.  She reports good energy level.  Overall she feels well.    Oncology/Hematology History Overview Note   CML-dx June 2012            Clinical Staging      CHRONIC PHASE CML            Treatments      Chemotherapy      Began Gleevec Summer 2012        Cancer (HCC) (Resolved)   6/28/2021 Initial Diagnosis    Cancer (CMS/HCC)     Leukemia (HCC) (Resolved)   6/28/2021 Initial Diagnosis    Leukemia (CMS/HCC)     CML (chronic myelocytic leukemia) (HCC)   7/15/2021 Initial Diagnosis    CML (chronic myelocytic leukemia) (CMS/HCC)     7/15/2021 Cancer Staged    Staging form: Chronic Myeloid Leukemia (Cml, AJCC 8th Edition  - Clinical: Ph+, Additional clonal changes: Absent - Signed by Sachin Walton MD on 7/15/2021      Chemotherapy    Gleevec initiated 2012         Review of Systems   Constitutional: Negative for fatigue.   HENT: Positive for sinus pressure.    Musculoskeletal: Positive for neck pain (pt feels the pain in back of neck (swollen lymphnode)).   Hematological: Positive for adenopathy.   All other systems reviewed and are negative.    Current Outpatient Medications on File Prior to Visit   Medication Sig Dispense Refill   • albuterol sulfate  (90 Base) MCG/ACT inhaler INHALE 1 TO 2 PUFFS BY MOUTH FOUR TIMES DAILY AS NEEDED     • imatinib (Gleevec) 400 MG chemo tablet Take 1 tablet by mouth Daily. 30 tablet 5   • Lo Loestrin Fe 1 MG-10 MCG / 10 MCG tablet TAKE 1 TABLET BY MOUTH DAILY 28 tablet 11   • montelukast (SINGULAIR) 10 MG tablet Take 10 mg by mouth every night at  bedtime.     • Multivitamin Adults tablet tablet Take 1 tablet by mouth Daily.     • Triamcinolone Acetonide (NASACORT) 55 MCG/ACT nasal inhaler 2 sprays into the nostril(s) as directed by provider Daily.     • vitamin B-12 (CYANOCOBALAMIN) 1000 MCG tablet Take 2,000 mcg by mouth Daily.     • vitamin D (ERGOCALCIFEROL) 1.25 MG (17354 UT) capsule capsule Take 50,000 Units by mouth Every 7 (Seven) Days.     • [DISCONTINUED] ondansetron (ZOFRAN) 4 MG tablet TAKE 2 TABLETS BY MOUTH EVERY 8 HOURS AS NEEDED FOR NAUSEA OR VOMITING 30 tablet 3   • [DISCONTINUED] Lo Loestrin Fe 1 MG-10 MCG / 10 MCG tablet TAKE 1 TABLET BY MOUTH DAILY 28 tablet 11   • [DISCONTINUED] Prochlorperazine Maleate (COMPAZINE PO) Compazine oral prn   Suspended       No current facility-administered medications on file prior to visit.       No Known Allergies  Past Medical History:   Diagnosis Date   • Anxiety    • Cancer (HCC)    • CML (chronic myelocytic leukemia) (HCC)     2+ years ago   • CML (chronic myelocytic leukemia) (HCC) 7/15/2021     Past Surgical History:   Procedure Laterality Date   • APPENDECTOMY  06/30/2012   • BREAST BIOPSY Bilateral    • COLONOSCOPY  2015   • ENDOSCOPY  2015     Social History     Socioeconomic History   • Marital status:    Tobacco Use   • Smoking status: Never Smoker   • Smokeless tobacco: Never Used   Vaping Use   • Vaping Use: Never used   Substance and Sexual Activity   • Alcohol use: Never     Comment: Current some day   • Drug use: Never   • Sexual activity: Yes     Partners: Male     Birth control/protection: OCP     Family History   Problem Relation Age of Onset   • Breast cancer Mother    • Cancer Mother         Unspecified   • Breast cancer Maternal Grandmother    • Lung cancer Paternal Grandmother        Objective   Physical Exam  Vitals reviewed. Exam conducted with a chaperone present.   Constitutional:       Appearance: Normal appearance.   Cardiovascular:      Rate and Rhythm: Normal rate and  regular rhythm.      Heart sounds: Normal heart sounds. No murmur heard.    No gallop.   Pulmonary:      Effort: Pulmonary effort is normal.      Breath sounds: Normal breath sounds.   Abdominal:      General: Abdomen is flat. Bowel sounds are normal.      Palpations: Abdomen is soft.   Musculoskeletal:      Cervical back: Neck supple.      Right lower leg: No edema.      Left lower leg: No edema.   Lymphadenopathy:      Cervical: No cervical adenopathy.   Neurological:      Mental Status: She is alert and oriented to person, place, and time.   Psychiatric:         Mood and Affect: Mood normal.         Behavior: Behavior normal.         Vitals:    08/01/22 1031   BP: 133/80   Pulse: 92   Resp: 16   Temp: 97.5 °F (36.4 °C)   SpO2: 100%   Weight: 53.2 kg (117 lb 4.6 oz)   PainSc:   4   PainLoc: Neck     ECOG score: 0         PHQ-9 Total Score:                    Result Review :   The following data was reviewed by: Sachin Walton MD on 08/01/2022:  Lab Results   Component Value Date    HGB 13.3 07/20/2022    HCT 39.0 07/20/2022    MCV 98.5 (H) 07/20/2022     07/20/2022    WBC 6.79 07/20/2022    NEUTROABS 3.96 07/20/2022    LYMPHSABS 2.25 07/20/2022    MONOSABS 0.52 07/20/2022    EOSABS 0.02 07/20/2022    BASOSABS 0.02 07/20/2022     Lab Results   Component Value Date    GLUCOSE 78 07/20/2022    BUN 10 07/20/2022    CREATININE 0.79 07/20/2022     07/20/2022    K 3.4 (L) 07/20/2022     07/20/2022    CO2 27.0 07/20/2022    CALCIUM 9.6 07/20/2022    PROTEINTOT 6.9 07/20/2022    ALBUMIN 4.90 07/20/2022    BILITOT 0.6 07/20/2022    ALKPHOS 49 07/20/2022    AST 11 07/20/2022    ALT 11 07/20/2022     No results found for: MG, PHOS, FREET4, TSH    PCR for BCR/ABL 0.03          Assessment and Plan    Diagnoses and all orders for this visit:    1. CML (chronic myelocytic leukemia) (HCC)  Assessment & Plan:  Chronic phase.  Patient has been on Gleevec since 2012.  PCR for BCR/ABL 0.03 consistent with major  molecular response.  The other blood counts are normal.  She is doing well.  Gleevec refilled today.  Continue same.  I will see her back in 6 months for ongoing surveillance with lab work prior including PCR. Zofran refilled today as needed for nausea from [place].    Orders:  -     CBC & Differential; Future  -     Comprehensive Metabolic Panel; Future  -     BCR-ABL1, CML / ALL, PCR, Quant; Future    2. Nausea  -     ondansetron (ZOFRAN) 4 MG tablet; Take 2 tablets by mouth Every 8 (Eight) Hours As Needed for Nausea or Vomiting.  Dispense: 30 tablet; Refill: 3          Patient Follow Up: 6 months    Patient was given instructions and counseling regarding her condition or for health maintenance advice. Please see specific information pulled into the AVS if appropriate.     Sachin Walton MD    8/1/2022

## 2022-08-15 ENCOUNTER — TELEPHONE (OUTPATIENT)
Dept: OBSTETRICS AND GYNECOLOGY | Facility: CLINIC | Age: 37
End: 2022-08-15

## 2022-08-15 NOTE — TELEPHONE ENCOUNTER
Unsuccessful x3 attempts to schedule breast MRI. Unable to contact letter sent to address on file.

## 2022-09-30 ENCOUNTER — SPECIALTY PHARMACY (OUTPATIENT)
Dept: PHARMACY | Facility: HOSPITAL | Age: 37
End: 2022-09-30

## 2022-10-07 ENCOUNTER — SPECIALTY PHARMACY (OUTPATIENT)
Dept: PHARMACY | Facility: HOSPITAL | Age: 37
End: 2022-10-07

## 2022-10-12 ENCOUNTER — HOSPITAL ENCOUNTER (OUTPATIENT)
Dept: MAMMOGRAPHY | Facility: HOSPITAL | Age: 37
Discharge: HOME | End: 2022-10-12

## 2022-10-12 ENCOUNTER — HOSPITAL ENCOUNTER (OUTPATIENT)
Dept: MRI IMAGING | Facility: HOSPITAL | Age: 37
Discharge: HOME | End: 2022-10-12

## 2022-10-12 ENCOUNTER — SPECIALTY PHARMACY (OUTPATIENT)
Dept: PHARMACY | Facility: HOSPITAL | Age: 37
End: 2022-10-12

## 2022-10-12 DIAGNOSIS — Z91.89 INCREASED RISK OF BREAST CANCER: ICD-10-CM

## 2022-10-12 PROCEDURE — 77067 SCR MAMMO BI INCL CAD: CPT

## 2022-10-12 PROCEDURE — 0 GADOBENATE DIMEGLUMINE 529 MG/ML SOLUTION: Performed by: OBSTETRICS & GYNECOLOGY

## 2022-10-12 PROCEDURE — 77063 BREAST TOMOSYNTHESIS BI: CPT

## 2022-10-12 PROCEDURE — A9577 INJ MULTIHANCE: HCPCS | Performed by: OBSTETRICS & GYNECOLOGY

## 2022-10-12 PROCEDURE — 77049 MRI BREAST C-+ W/CAD BI: CPT

## 2022-10-12 RX ADMIN — GADOBENATE DIMEGLUMINE 10 ML: 529 INJECTION, SOLUTION INTRAVENOUS at 08:58

## 2022-10-13 ENCOUNTER — SPECIALTY PHARMACY (OUTPATIENT)
Dept: PHARMACY | Facility: HOSPITAL | Age: 37
End: 2022-10-13

## 2022-10-13 NOTE — PROGRESS NOTES
Specialty Pharmacy Patient Management Program  Oncology 6-Month Clinical Assessment       Sailaja Mercer is a 37 y.o. female with CML. Today's encounter was conducted via telephone. to assess adherence and side effects.    Regimen: Gleevec(imatinib) 400 mg Take 1 tab daily    Reason for Outreach: Routine medication check-in .    {History:33}   Problem list reviewed by Rosmery Grande RPH on 10/13/2022 at  2:36 PM  Medicines reviewed by Rosmery Grande RPH on 10/13/2022 at  2:36 PM  Allergies reviewed by Rosmery Grande RPH on 10/13/2022 at  2:36 PM    Quality of Life Assessment  Quality of Life Improvement Scale: Moderately better     Goals     • Specialty Pharmacy General Goal      Patient-identified goal of therapy: Patient will adhere to medication regimen by %  Clinical goal/therapeutic target: Patient will adhere to medication regimen by %              Adherence Questions  Medication(s) assessed: Gleevec  On average, how many doses/injections does the patient miss per month?: 2  What are the identified reasons for non-adherence or missed doses? : no problems identfied  What is the estimated medication adherence level?: %  Based on the patient/caregiver response and refill history, does this patient require an MTP to track adherence improvements?: no    Assessments:  • Medication Assessment  o Medication Tolerability: Patient denies side effects, aside from nausea (takes ondansetron when needed) and fatigue on occasion, which is manageable and not bothersome to patient. Advised patient to alert office if this changes. .  o Therapeutically Appropriate: Yes  o Administration: Patient is taking every day at the same time .   o Patient can self administer medications: yes  o Medication Follow-Up Plan: routine follow-up  • Drug-drug interactions  o Completed medication reconciliation today to assess for drug interactions. Patient denies starting or stopping any medications.    o Assessed  medication list for interactions, no significant drug interactions noted.   o Advised patient to call the clinic if any new medications are started so we can assess for drug-drug interactions.  • Vaccines are coordinated by the patient's oncologist and primary care provider.  • Quality of Life: 8/10  • Adherence:  o Missed doses: Patient reports missing 2 doses in the last 60 days.  o Reasons for missed doses: Patient was out to dinner and did not have medication with her  • Medication availability/affordability: Patient has had no issues obtaining medication from pharmacy.  • Questions/concerns about medications: none at this time    All questions addressed and patient had no additional concerns. Patient has pharmacy contact information      Syeda Grande, [place], [facility]  Oncology Clinical Pharmacist  10/13/2022  14:38 EDT

## 2022-10-20 DIAGNOSIS — C92.10 CML (CHRONIC MYELOCYTIC LEUKEMIA): ICD-10-CM

## 2022-10-21 ENCOUNTER — SPECIALTY PHARMACY (OUTPATIENT)
Dept: PHARMACY | Facility: HOSPITAL | Age: 37
End: 2022-10-21

## 2022-10-21 RX ORDER — IMATINIB MESYLATE 400 MG/1
TABLET, FILM COATED ORAL
Qty: 30 TABLET | Refills: 11 | Status: SHIPPED | OUTPATIENT
Start: 2022-10-21

## 2022-10-21 NOTE — PROGRESS NOTES
Specialty Pharmacist Refill Note   Refills of Oral Specialty Medication - Gleevec (imatinib)    Received refill request from pharmacy. Reviewed most recent oncology office visit note dated 8/1/22 and labs dated 7/20/22.     • Drug-Drug Interactions: The current medication list was reviewed and there are no relevant drug-drug interactions.  • Medication Allergies: The patient has NKDA    Plan for continuation of imatinib with no dose adjustments. Released script for continuation of treatment.     Drug: Gleevec (imatinib)  Strength: 400 mg  Directions: Take 1 tablet by mouth daily  Quantity: 30  Refills: 11    Pharmacy prescription sent to: [facility]      Syeda Grande PharmD, BCPS  Oncology Clinical Pharmacist  10/21/2022  08:22 EDT

## 2022-11-10 ENCOUNTER — OFFICE VISIT (OUTPATIENT)
Dept: INTERNAL MEDICINE | Facility: CLINIC | Age: 37
End: 2022-11-10

## 2022-11-10 VITALS
OXYGEN SATURATION: 100 % | HEART RATE: 94 BPM | TEMPERATURE: 98.4 F | HEIGHT: 60 IN | WEIGHT: 119.8 LBS | DIASTOLIC BLOOD PRESSURE: 68 MMHG | BODY MASS INDEX: 23.52 KG/M2 | RESPIRATION RATE: 18 BRPM | SYSTOLIC BLOOD PRESSURE: 118 MMHG

## 2022-11-10 DIAGNOSIS — Z13.220 SCREENING FOR LIPID DISORDERS: ICD-10-CM

## 2022-11-10 DIAGNOSIS — Z01.89 ROUTINE LAB DRAW: ICD-10-CM

## 2022-11-10 DIAGNOSIS — E53.8 VITAMIN B12 DEFICIENCY: ICD-10-CM

## 2022-11-10 DIAGNOSIS — C92.10 CML (CHRONIC MYELOCYTIC LEUKEMIA): ICD-10-CM

## 2022-11-10 DIAGNOSIS — E55.9 VITAMIN D DEFICIENCY: ICD-10-CM

## 2022-11-10 DIAGNOSIS — R10.84 GENERALIZED ABDOMINAL PAIN: ICD-10-CM

## 2022-11-10 DIAGNOSIS — Z23 NEED FOR INFLUENZA VACCINATION: ICD-10-CM

## 2022-11-10 DIAGNOSIS — Z76.89 ENCOUNTER TO ESTABLISH CARE: Primary | ICD-10-CM

## 2022-11-10 PROBLEM — F41.9 ANXIETY: Status: RESOLVED | Noted: 2021-06-28 | Resolved: 2022-11-10

## 2022-11-10 PROCEDURE — 99203 OFFICE O/P NEW LOW 30 MIN: CPT

## 2022-11-10 PROCEDURE — 90686 IIV4 VACC NO PRSV 0.5 ML IM: CPT

## 2022-11-10 PROCEDURE — 90471 IMMUNIZATION ADMIN: CPT

## 2022-11-10 RX ORDER — MONTELUKAST SODIUM 10 MG/1
10 TABLET ORAL NIGHTLY
Qty: 90 TABLET | Refills: 1 | Status: SHIPPED | OUTPATIENT
Start: 2022-11-10 | End: 2023-02-01

## 2022-11-10 NOTE — ASSESSMENT & PLAN NOTE
Patient reports generalized abdominal discomfort for the last couple weeks.  Patient states she felt like she has been retaining fluid.  Patient states that she has had some persistent abdominal discomfort on the left side, nausea and fatigue.  Patient states she feels better when she is laying down.  Patient has tried to adjust her diet and then her symptoms improved but when she started eating again symptoms returned.  Patient was having occasional obstipation and diarrhea.  Denies vomiting or fever, dysuria, frequency.  Plan: We will start out with getting lab work.  We will follow-up with patient in a couple of weeks.

## 2022-11-10 NOTE — PROGRESS NOTES
Chief Complaint  Establish Care (Pt states that she is wanting to establish care with Liliam. ) and GI Problem (Pt states that about 3 weeks ago she started to notice she is retaining water and she hasn't used the restroom so she was constipated. She states that she can feel a fullness on her left side, and with certain foods she can feel it. )    History of Present Illness  SUBJECTIVE  Sailaja Mercer presents to [facility] to establish care from Dr. Aguilar.    Medical problems include: CML, allergies    Pt is on gleevac daily for CML. She sees Dr. Walton.   Flu-Will get today  Mammo-annual, MRI every other year  PAP-UTD  Colonoscopy in 2015 by Dr. Swift.  Unremarkable.  COVID-19 vaccine-initial series, declines booster    2.5 weeks ago-pt feels like she has been retaining fluid. She was constipated so she increased her probiotic. She states that she has had persistent abdominal discomfort, nausea, fatigue. She felt better when she was laying down.   She tried to adjust her diet.    Denies vomiting or fever.     Patient denies any chest pain, shortness of breath, palpitations.      Past Medical History:   Diagnosis Date   • Anxiety    • Cancer (HCC)    • CML (chronic myelocytic leukemia) (HCC)     2+ years ago   • CML (chronic myelocytic leukemia) (HCC) 7/15/2021      Family History   Problem Relation Age of Onset   • Breast cancer Mother    • Cancer Mother         Unspecified   • Breast cancer Maternal Grandmother    • Lung cancer Paternal Grandmother       Past Surgical History:   Procedure Laterality Date   • APPENDECTOMY  06/30/2012   • BREAST BIOPSY Bilateral    • COLONOSCOPY  2015   • ENDOSCOPY  2015        Current Outpatient Medications:   •  albuterol sulfate  (90 Base) MCG/ACT inhaler, INHALE 1 TO 2 PUFFS BY MOUTH FOUR TIMES DAILY AS NEEDED, Disp: , Rfl:   •  imatinib (Gleevec) 400 MG chemo tablet, Take 1 tablet by mouth once daily, Disp: 30 tablet, Rfl: 11  •  Lo  "Loestrin Fe 1 MG-10 MCG / 10 MCG tablet, TAKE 1 TABLET BY MOUTH DAILY, Disp: 28 tablet, Rfl: 11  •  Multivitamin Adults tablet tablet, Take 1 tablet by mouth Daily., Disp: , Rfl:   •  ondansetron (ZOFRAN) 4 MG tablet, Take 2 tablets by mouth Every 8 (Eight) Hours As Needed for Nausea or Vomiting., Disp: 30 tablet, Rfl: 3  •  vitamin B-12 (CYANOCOBALAMIN) 1000 MCG tablet, Take 2,000 mcg by mouth Daily., Disp: , Rfl:   •  vitamin D (ERGOCALCIFEROL) 1.25 MG (39952 UT) capsule capsule, Take 50,000 Units by mouth Every 7 (Seven) Days., Disp: , Rfl:   •  montelukast (Singulair) 10 MG tablet, Take 1 tablet by mouth Every Night., Disp: 90 tablet, Rfl: 1    OBJECTIVE  Vital Signs:   /68 (BP Location: Right arm)   Pulse 94   Temp 98.4 °F (36.9 °C) (Temporal)   Resp 18   Ht 152.4 cm (60\")   Wt 54.3 kg (119 lb 12.8 oz)   LMP  (LMP Unknown) Comment: PT REPORTS BIRTHCONTROL MAKES PERIODS IRREGULAR  SpO2 100%   BMI 23.40 kg/m²    Estimated body mass index is 23.4 kg/m² as calculated from the following:    Height as of this encounter: 152.4 cm (60\").    Weight as of this encounter: 54.3 kg (119 lb 12.8 oz).     Wt Readings from Last 3 Encounters:   11/10/22 54.3 kg (119 lb 12.8 oz)   11/06/22 53.3 kg (117 lb 8 oz)   08/01/22 53.2 kg (117 lb 4.6 oz)     BP Readings from Last 3 Encounters:   11/10/22 118/68   11/06/22 117/81   08/01/22 133/80       Physical Exam  Vitals and nursing note reviewed.   Constitutional:       Appearance: Normal appearance.   HENT:      Head: Normocephalic.   Eyes:      Extraocular Movements: Extraocular movements intact.      Conjunctiva/sclera: Conjunctivae normal.   Cardiovascular:      Rate and Rhythm: Normal rate and regular rhythm.      Heart sounds: Normal heart sounds. No murmur heard.  Pulmonary:      Effort: Pulmonary effort is normal.      Breath sounds: Normal breath sounds. No wheezing or rales.   Abdominal:      General: Bowel sounds are normal.      Palpations: Abdomen is soft. "      Tenderness: There is no abdominal tenderness.          Comments: tenderness   Musculoskeletal:         General: No swelling. Normal range of motion.   Skin:     General: Skin is warm and dry.   Neurological:      General: No focal deficit present.      Mental Status: She is alert. Mental status is at baseline.   Psychiatric:         Mood and Affect: Mood normal.         Thought Content: Thought content normal.          Result Review    MRI Breast Bilateral With & Without Contrast    Result Date: 10/12/2022  Right breast:  Within normal limits.  Left breast:  Apparent skin thickening superior posterior left breast is most compatible with Sydnee artifact related to skin contact with the breast coil.  Otherwise within normal limits.  Given high risk and extreme breast density consider annual breast MRI screening.  BIRADS: DIAGNOSTIC CATEGORY 2--BENIGN FINDING   RECOMMENDATION(S): ROUTINE MAMMOGRAM AND CLINICAL EVALUATION IN 12 MONTHS.    PLEASE NOTE:  A NORMAL MRI DOES NOT EXCLUDE THE POSSIBILITY OF BREAST CANCER.  A CLINICALLY SUSPICIOUS PALPABLE LUMP SHOULD BE BIOPSIED.      Husam Amato M.D.       Electronically Signed and Approved By: Husam Amato M.D. on 10/12/2022 at 9:46             Mammo Screening Digital Tomosynthesis Bilateral With CAD    Result Date: 10/12/2022   Benign mammogram. Suggest routine mammographic screening.  RECOMMENDATION(S):  ROUTINE MAMMOGRAM AND CLINICAL EVALUATION IN 12 MONTHS.   BIRADS:  DIAGNOSTIC CATEGORY 1--NEGATIVE.   BREAST COMPOSITION: Extremely dense, which lowers the sensitivity of mammography.  PLEASE NOTE:  A NORMAL MAMMOGRAM DOES NOT EXCLUDE THE POSSIBILITY OF BREAST CANCER. ANY CLINICALLY SUSPICIOUS PALPABLE LUMP SHOULD BE BIOPSIED.      Husam Amato M.D.       Electronically Signed and Approved By: Husam Amato M.D. on 10/12/2022 at 7:58                The above data has been reviewed by MYRON Choi 11/10/2022 13:49 EST.          Patient Care  Team:  Liliam Estrada APRN as PCP - General (Internal Medicine)  Sachin Walton MD as Consulting Physician (Hematology and Oncology)    BMI is within normal parameters. No other follow-up for BMI required.       ASSESSMENT & PLAN    Diagnoses and all orders for this visit:    1. Encounter to establish care (Primary)    2. CML (chronic myelocytic leukemia) (HCC)    3. Routine lab draw  -     CBC & Differential; Future  -     Comprehensive Metabolic Panel; Future  -     TSH; Future    4. Vitamin D deficiency  -     Vitamin D,25-Hydroxy; Future    5. Vitamin B12 deficiency  -     Vitamin B12 & Folate; Future    6. Screening for lipid disorders  -     Lipid Panel; Future    7. Generalized abdominal pain  Assessment & Plan:  Patient reports generalized abdominal discomfort for the last couple weeks.  Patient states she felt like she has been retaining fluid.  Patient states that she has had some persistent abdominal discomfort on the left side, nausea and fatigue.  Patient states she feels better when she is laying down.  Patient has tried to adjust her diet and then her symptoms improved but when she started eating again symptoms returned.  Patient was having occasional obstipation and diarrhea.  Denies vomiting or fever, dysuria, frequency.  Plan: We will start out with getting lab work.  We will follow-up with patient in a couple of weeks.    Orders:  -     Urinalysis With Microscopic - Urine, Clean Catch; Future  -     Lipase; Future  -     Amylase; Future    8. Need for influenza vaccination  -     FluLaval/Fluzone >6 mos ([id number])    Other orders  -     montelukast (Singulair) 10 MG tablet; Take 1 tablet by mouth Every Night.  Dispense: 90 tablet; Refill: 1       Tobacco Use: Low Risk    • Smoking Tobacco Use: Never   • Smokeless Tobacco Use: Never   • Passive Exposure: Not on file       Follow Up     Return in about 2 weeks (around 11/24/2022) for Annual physical.    Please note that portions of this note were  completed with a voice recognition program.    Patient was given instructions and counseling regarding her condition or for health maintenance advice. Please see specific information pulled into the AVS if appropriate.   I have reviewed information obtained and documented by others and I have confirmed the accuracy of this documented note.    MYRON Choi

## 2022-11-11 ENCOUNTER — LAB (OUTPATIENT)
Dept: LAB | Facility: HOSPITAL | Age: 37
End: 2022-11-11

## 2022-11-11 DIAGNOSIS — Z01.89 ROUTINE LAB DRAW: ICD-10-CM

## 2022-11-11 DIAGNOSIS — R10.84 GENERALIZED ABDOMINAL PAIN: ICD-10-CM

## 2022-11-11 DIAGNOSIS — Z13.220 SCREENING FOR LIPID DISORDERS: ICD-10-CM

## 2022-11-11 DIAGNOSIS — E55.9 VITAMIN D DEFICIENCY: ICD-10-CM

## 2022-11-11 DIAGNOSIS — E53.8 VITAMIN B12 DEFICIENCY: ICD-10-CM

## 2022-11-11 LAB
25(OH)D3 SERPL-MCNC: 55.3 NG/ML (ref 30–100)
ALBUMIN SERPL-MCNC: 4.3 G/DL (ref 3.5–5.2)
ALBUMIN/GLOB SERPL: 1.9 G/DL
ALP SERPL-CCNC: 48 U/L (ref 39–117)
ALT SERPL W P-5'-P-CCNC: 13 U/L (ref 1–33)
AMYLASE SERPL-CCNC: 49 U/L (ref 28–100)
ANION GAP SERPL CALCULATED.3IONS-SCNC: 9 MMOL/L (ref 5–15)
AST SERPL-CCNC: 14 U/L (ref 1–32)
BACTERIA UR QL AUTO: ABNORMAL /HPF
BASOPHILS # BLD AUTO: 0.02 10*3/MM3 (ref 0–0.2)
BASOPHILS NFR BLD AUTO: 0.4 % (ref 0–1.5)
BILIRUB SERPL-MCNC: 0.7 MG/DL (ref 0–1.2)
BILIRUB UR QL STRIP: NEGATIVE
BUN SERPL-MCNC: 9 MG/DL (ref 6–20)
BUN/CREAT SERPL: 10.6 (ref 7–25)
CALCIUM SPEC-SCNC: 9.2 MG/DL (ref 8.6–10.5)
CHLORIDE SERPL-SCNC: 104 MMOL/L (ref 98–107)
CHOLEST SERPL-MCNC: 163 MG/DL (ref 0–200)
CLARITY UR: CLEAR
CO2 SERPL-SCNC: 26 MMOL/L (ref 22–29)
COLOR UR: YELLOW
CREAT SERPL-MCNC: 0.85 MG/DL (ref 0.57–1)
DEPRECATED RDW RBC AUTO: 42.1 FL (ref 37–54)
EGFRCR SERPLBLD CKD-EPI 2021: 91.2 ML/MIN/1.73
EOSINOPHIL # BLD AUTO: 0.05 10*3/MM3 (ref 0–0.4)
EOSINOPHIL NFR BLD AUTO: 0.9 % (ref 0.3–6.2)
ERYTHROCYTE [DISTWIDTH] IN BLOOD BY AUTOMATED COUNT: 11.7 % (ref 12.3–15.4)
FOLATE SERPL-MCNC: 12.4 NG/ML (ref 4.78–24.2)
GLOBULIN UR ELPH-MCNC: 2.3 GM/DL
GLUCOSE SERPL-MCNC: 88 MG/DL (ref 65–99)
GLUCOSE UR STRIP-MCNC: NEGATIVE MG/DL
HCT VFR BLD AUTO: 40.4 % (ref 34–46.6)
HDLC SERPL-MCNC: 56 MG/DL (ref 40–60)
HGB BLD-MCNC: 13.6 G/DL (ref 12–15.9)
HGB UR QL STRIP.AUTO: NEGATIVE
HYALINE CASTS UR QL AUTO: ABNORMAL /LPF
IMM GRANULOCYTES # BLD AUTO: 0.02 10*3/MM3 (ref 0–0.05)
IMM GRANULOCYTES NFR BLD AUTO: 0.4 % (ref 0–0.5)
KETONES UR QL STRIP: NEGATIVE
LDLC SERPL CALC-MCNC: 94 MG/DL (ref 0–100)
LDLC/HDLC SERPL: 1.68 {RATIO}
LEUKOCYTE ESTERASE UR QL STRIP.AUTO: ABNORMAL
LIPASE SERPL-CCNC: 30 U/L (ref 13–60)
LYMPHOCYTES # BLD AUTO: 0.93 10*3/MM3 (ref 0.7–3.1)
LYMPHOCYTES NFR BLD AUTO: 17.1 % (ref 19.6–45.3)
MCH RBC QN AUTO: 32.9 PG (ref 26.6–33)
MCHC RBC AUTO-ENTMCNC: 33.7 G/DL (ref 31.5–35.7)
MCV RBC AUTO: 97.8 FL (ref 79–97)
MONOCYTES # BLD AUTO: 0.34 10*3/MM3 (ref 0.1–0.9)
MONOCYTES NFR BLD AUTO: 6.3 % (ref 5–12)
NEUTROPHILS NFR BLD AUTO: 4.07 10*3/MM3 (ref 1.7–7)
NEUTROPHILS NFR BLD AUTO: 74.9 % (ref 42.7–76)
NITRITE UR QL STRIP: NEGATIVE
NRBC BLD AUTO-RTO: 0 /100 WBC (ref 0–0.2)
PH UR STRIP.AUTO: 8 [PH] (ref 5–8)
PLATELET # BLD AUTO: 228 10*3/MM3 (ref 140–450)
PMV BLD AUTO: 9.9 FL (ref 6–12)
POTASSIUM SERPL-SCNC: 4.1 MMOL/L (ref 3.5–5.2)
PROT SERPL-MCNC: 6.6 G/DL (ref 6–8.5)
PROT UR QL STRIP: ABNORMAL
RBC # BLD AUTO: 4.13 10*6/MM3 (ref 3.77–5.28)
RBC # UR STRIP: ABNORMAL /HPF
REF LAB TEST METHOD: ABNORMAL
SODIUM SERPL-SCNC: 139 MMOL/L (ref 136–145)
SP GR UR STRIP: 1.02 (ref 1–1.03)
SQUAMOUS #/AREA URNS HPF: ABNORMAL /HPF
TRIGL SERPL-MCNC: 65 MG/DL (ref 0–150)
TSH SERPL DL<=0.05 MIU/L-ACNC: 0.8 UIU/ML (ref 0.27–4.2)
UROBILINOGEN UR QL STRIP: ABNORMAL
VIT B12 BLD-MCNC: 569 PG/ML (ref 211–946)
VLDLC SERPL-MCNC: 13 MG/DL (ref 5–40)
WBC # UR STRIP: ABNORMAL /HPF
WBC NRBC COR # BLD: 5.43 10*3/MM3 (ref 3.4–10.8)

## 2022-11-11 PROCEDURE — 82306 VITAMIN D 25 HYDROXY: CPT

## 2022-11-11 PROCEDURE — 82607 VITAMIN B-12: CPT

## 2022-11-11 PROCEDURE — 82746 ASSAY OF FOLIC ACID SERUM: CPT

## 2022-11-11 PROCEDURE — 81001 URINALYSIS AUTO W/SCOPE: CPT

## 2022-11-11 PROCEDURE — 83690 ASSAY OF LIPASE: CPT

## 2022-11-11 PROCEDURE — 82150 ASSAY OF AMYLASE: CPT

## 2022-11-11 PROCEDURE — 36415 COLL VENOUS BLD VENIPUNCTURE: CPT

## 2022-11-11 PROCEDURE — 80050 GENERAL HEALTH PANEL: CPT

## 2022-11-11 PROCEDURE — 80061 LIPID PANEL: CPT

## 2023-01-02 ENCOUNTER — LAB (OUTPATIENT)
Dept: LAB | Facility: HOSPITAL | Age: 38
End: 2023-01-02
Payer: COMMERCIAL

## 2023-01-02 DIAGNOSIS — C92.10 CML (CHRONIC MYELOCYTIC LEUKEMIA): ICD-10-CM

## 2023-01-02 LAB
ALBUMIN SERPL-MCNC: 4.3 G/DL (ref 3.5–5.2)
ALBUMIN/GLOB SERPL: 1.9 G/DL
ALP SERPL-CCNC: 50 U/L (ref 39–117)
ALT SERPL W P-5'-P-CCNC: 11 U/L (ref 1–33)
ANION GAP SERPL CALCULATED.3IONS-SCNC: 9.1 MMOL/L (ref 5–15)
AST SERPL-CCNC: 14 U/L (ref 1–32)
BASOPHILS # BLD AUTO: 0.01 10*3/MM3 (ref 0–0.2)
BASOPHILS NFR BLD AUTO: 0.2 % (ref 0–1.5)
BILIRUB SERPL-MCNC: 0.7 MG/DL (ref 0–1.2)
BUN SERPL-MCNC: 10 MG/DL (ref 6–20)
BUN/CREAT SERPL: 12.8 (ref 7–25)
CALCIUM SPEC-SCNC: 9.1 MG/DL (ref 8.6–10.5)
CHLORIDE SERPL-SCNC: 102 MMOL/L (ref 98–107)
CO2 SERPL-SCNC: 25.9 MMOL/L (ref 22–29)
CREAT SERPL-MCNC: 0.78 MG/DL (ref 0.57–1)
DEPRECATED RDW RBC AUTO: 40.9 FL (ref 37–54)
EGFRCR SERPLBLD CKD-EPI 2021: 101.1 ML/MIN/1.73
EOSINOPHIL # BLD AUTO: 0.06 10*3/MM3 (ref 0–0.4)
EOSINOPHIL NFR BLD AUTO: 1.4 % (ref 0.3–6.2)
ERYTHROCYTE [DISTWIDTH] IN BLOOD BY AUTOMATED COUNT: 11.7 % (ref 12.3–15.4)
GLOBULIN UR ELPH-MCNC: 2.3 GM/DL
GLUCOSE SERPL-MCNC: 87 MG/DL (ref 65–99)
HCT VFR BLD AUTO: 39.1 % (ref 34–46.6)
HGB BLD-MCNC: 13.2 G/DL (ref 12–15.9)
IMM GRANULOCYTES # BLD AUTO: 0.01 10*3/MM3 (ref 0–0.05)
IMM GRANULOCYTES NFR BLD AUTO: 0.2 % (ref 0–0.5)
LYMPHOCYTES # BLD AUTO: 1.35 10*3/MM3 (ref 0.7–3.1)
LYMPHOCYTES NFR BLD AUTO: 31.5 % (ref 19.6–45.3)
MCH RBC QN AUTO: 32.4 PG (ref 26.6–33)
MCHC RBC AUTO-ENTMCNC: 33.8 G/DL (ref 31.5–35.7)
MCV RBC AUTO: 96.1 FL (ref 79–97)
MONOCYTES # BLD AUTO: 0.3 10*3/MM3 (ref 0.1–0.9)
MONOCYTES NFR BLD AUTO: 7 % (ref 5–12)
NEUTROPHILS NFR BLD AUTO: 2.56 10*3/MM3 (ref 1.7–7)
NEUTROPHILS NFR BLD AUTO: 59.7 % (ref 42.7–76)
NRBC BLD AUTO-RTO: 0 /100 WBC (ref 0–0.2)
PLATELET # BLD AUTO: 290 10*3/MM3 (ref 140–450)
PMV BLD AUTO: 10.8 FL (ref 6–12)
POTASSIUM SERPL-SCNC: 4.4 MMOL/L (ref 3.5–5.2)
PROT SERPL-MCNC: 6.6 G/DL (ref 6–8.5)
RBC # BLD AUTO: 4.07 10*6/MM3 (ref 3.77–5.28)
SODIUM SERPL-SCNC: 137 MMOL/L (ref 136–145)
WBC NRBC COR # BLD: 4.29 10*3/MM3 (ref 3.4–10.8)

## 2023-01-02 PROCEDURE — 80053 COMPREHEN METABOLIC PANEL: CPT

## 2023-01-02 PROCEDURE — 81206 BCR/ABL1 GENE MAJOR BP: CPT

## 2023-01-02 PROCEDURE — 81207 BCR/ABL1 GENE MINOR BP: CPT

## 2023-01-02 PROCEDURE — 36415 COLL VENOUS BLD VENIPUNCTURE: CPT

## 2023-01-02 PROCEDURE — 85025 COMPLETE CBC W/AUTO DIFF WBC: CPT

## 2023-01-06 LAB
INTERPRETATION: POSITIVE
LAB DIRECTOR NAME PROVIDER: NORMAL
LABORATORY COMMENT REPORT: NORMAL
REF LAB TEST METHOD: NORMAL
T(ABL1,BCR)B2A2/CONTROL BLD/T: NORMAL %
T(ABL1,BCR)B3A2/CONTROL BLD/T: 0.11 %
T(ABL1,BCR)E1A2/CONTROL BLD/T: NORMAL %

## 2023-01-09 ENCOUNTER — TELEPHONE (OUTPATIENT)
Dept: ONCOLOGY | Facility: HOSPITAL | Age: 38
End: 2023-01-09
Payer: COMMERCIAL

## 2023-01-09 NOTE — TELEPHONE ENCOUNTER
pt called and states that she was reviewing her labs on her My Chart and had concerns and questions about her BCR due to it being elevated. pt is asking to speak to Dr Walton or someone who can explain or discuss her BCR result.

## 2023-01-09 NOTE — TELEPHONE ENCOUNTER
Caller: Sailaja Mercer    Relationship: Self    Best call back number: [phone]    What is the best time to reach you: AFTER 3:30    Who are you requesting to speak with (clinical staff, provider,  specific staff member): SCHEDULING     What was the call regarding: PT CALLED WANTS TO SEE IF HER APPOINTMENT ON 2/6 CAN BE MOVED UP SOONER     Do you require a callback: YES

## 2023-01-16 ENCOUNTER — SPECIALTY PHARMACY (OUTPATIENT)
Dept: PHARMACY | Facility: HOSPITAL | Age: 38
End: 2023-01-16
Payer: COMMERCIAL

## 2023-01-18 ENCOUNTER — SPECIALTY PHARMACY (OUTPATIENT)
Dept: PHARMACY | Facility: HOSPITAL | Age: 38
End: 2023-01-18
Payer: COMMERCIAL

## 2023-01-23 ENCOUNTER — SPECIALTY PHARMACY (OUTPATIENT)
Dept: PHARMACY | Facility: HOSPITAL | Age: 38
End: 2023-01-23
Payer: COMMERCIAL

## 2023-01-26 ENCOUNTER — SPECIALTY PHARMACY (OUTPATIENT)
Dept: PHARMACY | Facility: HOSPITAL | Age: 38
End: 2023-01-26
Payer: COMMERCIAL

## 2023-01-30 ENCOUNTER — SPECIALTY PHARMACY (OUTPATIENT)
Dept: PHARMACY | Facility: HOSPITAL | Age: 38
End: 2023-01-30
Payer: COMMERCIAL

## 2023-02-01 ENCOUNTER — LAB (OUTPATIENT)
Dept: ONCOLOGY | Facility: HOSPITAL | Age: 38
End: 2023-02-01
Payer: COMMERCIAL

## 2023-02-01 ENCOUNTER — SPECIALTY PHARMACY (OUTPATIENT)
Dept: PHARMACY | Facility: HOSPITAL | Age: 38
End: 2023-02-01
Payer: COMMERCIAL

## 2023-02-01 ENCOUNTER — OFFICE VISIT (OUTPATIENT)
Dept: ONCOLOGY | Facility: HOSPITAL | Age: 38
End: 2023-02-01
Payer: COMMERCIAL

## 2023-02-01 VITALS
TEMPERATURE: 98.8 F | SYSTOLIC BLOOD PRESSURE: 125 MMHG | RESPIRATION RATE: 18 BRPM | HEART RATE: 95 BPM | WEIGHT: 119.71 LBS | OXYGEN SATURATION: 100 % | BODY MASS INDEX: 23.38 KG/M2 | DIASTOLIC BLOOD PRESSURE: 73 MMHG

## 2023-02-01 DIAGNOSIS — C92.10 CML (CHRONIC MYELOCYTIC LEUKEMIA): ICD-10-CM

## 2023-02-01 DIAGNOSIS — C92.10 CML (CHRONIC MYELOCYTIC LEUKEMIA): Primary | ICD-10-CM

## 2023-02-01 PROCEDURE — 99214 OFFICE O/P EST MOD 30 MIN: CPT | Performed by: INTERNAL MEDICINE

## 2023-02-01 PROCEDURE — G0463 HOSPITAL OUTPT CLINIC VISIT: HCPCS | Performed by: INTERNAL MEDICINE

## 2023-02-01 PROCEDURE — 36415 COLL VENOUS BLD VENIPUNCTURE: CPT

## 2023-02-01 RX ORDER — HYDROCHLOROTHIAZIDE 12.5 MG/1
1 TABLET ORAL DAILY
COMMUNITY
Start: 2022-11-18

## 2023-02-01 NOTE — PROGRESS NOTES
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  She is down 10 years on Gleevec.  She is taking her medication daily as prescribed.  She denies fever, chills, weight loss, night sweats or adenopathy.  She reports normal energy level and is working full-time.  She denies excessive bruising or bleeding.  She notes good appetite and her weight is maintained.  She is having some trouble with neuropathy in her hands and is currently working with her primary care provider.  She reports a lump in the left breast-she receives mammograms through her gynecologist and and is working with her regarding further evaluation.    Oncology/Hematology History Overview Note   CML-dx June 2012            Clinical Staging      CHRONIC PHASE CML            Treatments      Chemotherapy      Began Gleevec Summer 2012        Cancer (HCC) (Resolved)   6/28/2021 Initial Diagnosis    Cancer (CMS/HCC)     Leukemia (HCC) (Resolved)   6/28/2021 Initial Diagnosis    Leukemia (CMS/HCC)     CML (chronic myelocytic leukemia) (HCC)   7/15/2021 Initial Diagnosis    CML (chronic myelocytic leukemia) (CMS/HCC)     7/15/2021 Cancer Staged    Staging form: Chronic Myeloid Leukemia (Cml, AJCC 8th Edition  - Clinical: Ph+, Additional clonal changes: Absent - Signed by Sachin Walton MD on 7/15/2021      Chemotherapy    Gleevec initiated 2012         Review of Systems   Constitutional: Positive for fatigue. Negative for appetite change, diaphoresis, fever, unexpected weight gain and unexpected weight loss.   HENT: Negative for hearing loss, sore throat and voice change.    Eyes: Negative for blurred vision, double vision, pain, redness and visual disturbance.   Respiratory: Negative for cough, shortness of breath and wheezing.    Cardiovascular: Negative for chest pain, palpitations and leg swelling.   Endocrine:  Negative for cold intolerance, heat intolerance, polydipsia and polyuria.   Genitourinary: Positive for breast lump (LEft breast) and breast pain. Negative for decreased urine volume, difficulty urinating, frequency and urinary incontinence.   Musculoskeletal: Negative for arthralgias, back pain, joint swelling and myalgias.   Skin: Negative for color change, rash, skin lesions and wound.   Neurological: Positive for numbness (Hands). Negative for dizziness, seizures and headache.   Hematological: Negative for adenopathy. Does not bruise/bleed easily.   Psychiatric/Behavioral: Negative for depressed mood. The patient is not nervous/anxious.      Current Outpatient Medications on File Prior to Visit   Medication Sig Dispense Refill   • albuterol sulfate  (90 Base) MCG/ACT inhaler INHALE 1 TO 2 PUFFS BY MOUTH FOUR TIMES DAILY AS NEEDED     • hydroCHLOROthiazide (HYDRODIURIL) 12.5 MG tablet Take 1 tablet by mouth Daily.     • imatinib (Gleevec) 400 MG chemo tablet Take 1 tablet by mouth once daily 30 tablet 11   • Lo Loestrin Fe 1 MG-10 MCG / 10 MCG tablet TAKE 1 TABLET BY MOUTH DAILY 28 tablet 11   • ondansetron (ZOFRAN) 4 MG tablet Take 2 tablets by mouth Every 8 (Eight) Hours As Needed for Nausea or Vomiting. 30 tablet 3   • tretinoin (RETIN-A) 0.025 % cream APPLY PEA SIZED AMOUNT TOPICALLY TO FACE EVERY NIGHT AT BEDTIME AFTER WASHING THE FACE     • [DISCONTINUED] montelukast (Singulair) 10 MG tablet Take 1 tablet by mouth Every Night. 90 tablet 1   • [DISCONTINUED] Multivitamin Adults tablet tablet Take 1 tablet by mouth Daily.     • [DISCONTINUED] vitamin B-12 (CYANOCOBALAMIN) 1000 MCG tablet Take 2,000 mcg by mouth Daily.     • [DISCONTINUED] vitamin D (ERGOCALCIFEROL) 1.25 MG (62159 UT) capsule capsule Take 50,000 Units by mouth Every 7 (Seven) Days.       No current facility-administered medications on file prior to visit.       No Known Allergies  Past Medical History:   Diagnosis Date   • Anxiety    •  Cancer (HCC)    • CML (chronic myelocytic leukemia) (HCC)     2+ years ago   • CML (chronic myelocytic leukemia) (HCC) 7/15/2021     Past Surgical History:   Procedure Laterality Date   • APPENDECTOMY  06/30/2012   • BREAST BIOPSY Bilateral    • COLONOSCOPY  2015   • ENDOSCOPY  2015     Social History     Socioeconomic History   • Marital status:    Tobacco Use   • Smoking status: Never   • Smokeless tobacco: Never   Vaping Use   • Vaping Use: Never used   Substance and Sexual Activity   • Alcohol use: Never     Comment: Current some day   • Drug use: Never   • Sexual activity: Yes     Partners: Male     Birth control/protection: OCP     Family History   Problem Relation Age of Onset   • Breast cancer Mother    • Cancer Mother         Unspecified   • Breast cancer Maternal Grandmother    • Lung cancer Paternal Grandmother        Objective   Physical Exam  Vitals reviewed. Exam conducted with a chaperone present.   Constitutional:       General: She is not in acute distress.     Appearance: Normal appearance.   Cardiovascular:      Rate and Rhythm: Normal rate and regular rhythm.      Heart sounds: Normal heart sounds. No murmur heard.    No gallop.   Pulmonary:      Effort: Pulmonary effort is normal.      Breath sounds: Normal breath sounds.   Abdominal:      General: Abdomen is flat. Bowel sounds are normal.      Palpations: Abdomen is soft.   Musculoskeletal:      Cervical back: Neck supple.      Right lower leg: No edema.      Left lower leg: No edema.   Lymphadenopathy:      Cervical: No cervical adenopathy.   Neurological:      Mental Status: She is alert and oriented to person, place, and time.   Psychiatric:         Mood and Affect: Mood normal.         Behavior: Behavior normal.         Vitals:    02/01/23 0820   BP: 125/73   Pulse: 95   Resp: 18   Temp: 98.8 °F (37.1 °C)   TempSrc: Temporal   SpO2: 100%   Weight: 54.3 kg (119 lb 11.4 oz)   PainSc: 0-No pain     ECOG score: 0         PHQ-9 Total  Score:                    Result Review :   The following data was reviewed by: Sachin Walton MD on 02/01/2023:  Lab Results   Component Value Date    HGB 13.2 01/02/2023    HCT 39.1 01/02/2023    MCV 96.1 01/02/2023     01/02/2023    WBC 4.29 01/02/2023    NEUTROABS 2.56 01/02/2023    LYMPHSABS 1.35 01/02/2023    MONOSABS 0.30 01/02/2023    EOSABS 0.06 01/02/2023    BASOSABS 0.01 01/02/2023     Lab Results   Component Value Date    GLUCOSE 87 01/02/2023    BUN 10 01/02/2023    CREATININE 0.78 01/02/2023     01/02/2023    K 4.4 01/02/2023     01/02/2023    CO2 25.9 01/02/2023    CALCIUM 9.1 01/02/2023    PROTEINTOT 6.6 01/02/2023    ALBUMIN 4.3 01/02/2023    BILITOT 0.7 01/02/2023    ALKPHOS 50 01/02/2023    AST 14 01/02/2023    ALT 11 01/02/2023     Lab Results   Component Value Date    TSH 0.796 11/11/2022     PCR for BCR/ABL 0.109          Assessment and Plan    Diagnoses and all orders for this visit:    1. CML (chronic myelocytic leukemia) (HCC) (Primary)  Assessment & Plan:  Patient is on Gleevec 400 mg daily.  Tolerating her medication well and compliant with the regimen.  PCR for BCR/ABL has increased to 0.1.  This is 1 log higher than previous and is concerning for loss of efficacy for the Gleevec.  I will repeat BCR/ABL PCR today along with mutation testing for BCR/ABL.  Her blood counts remain normal and she has no symptoms related to her disease.  I will see her back in 1 month with repeat PCR testing.  For now continue Gleevec.    Orders:  -     BCR-ABL1, CML / ALL, PCR, Quant; Future  -     BCR-ABL1, CML / ALL, PCR, Quant; Future  -     CBC & Differential; Future  -     Comprehensive Metabolic Panel; Future          Patient Follow Up: 1 month    Patient was given instructions and counseling regarding her condition or for health maintenance advice. Please see specific information pulled into the AVS if appropriate.     Sachin Walton MD    2/1/2023

## 2023-02-01 NOTE — ASSESSMENT & PLAN NOTE
Patient is on Gleevec 400 mg daily.  Tolerating her medication well and compliant with the regimen.  PCR for BCR/ABL has increased to 0.1.  This is 1 log higher than previous and is concerning for loss of efficacy for the Gleevec.  I will repeat BCR/ABL PCR today along with mutation testing for BCR/ABL.  Her blood counts remain normal and she has no symptoms related to her disease.  I will see her back in 1 month with repeat PCR testing.  For now continue Gleevec.

## 2023-02-06 ENCOUNTER — OFFICE VISIT (OUTPATIENT)
Dept: OBSTETRICS AND GYNECOLOGY | Facility: CLINIC | Age: 38
End: 2023-02-06
Payer: COMMERCIAL

## 2023-02-06 VITALS
DIASTOLIC BLOOD PRESSURE: 83 MMHG | WEIGHT: 121.4 LBS | SYSTOLIC BLOOD PRESSURE: 116 MMHG | HEIGHT: 60 IN | BODY MASS INDEX: 23.84 KG/M2 | HEART RATE: 89 BPM

## 2023-02-06 DIAGNOSIS — Z91.89 AT HIGH RISK FOR BREAST CANCER: ICD-10-CM

## 2023-02-06 DIAGNOSIS — N63.25 BREAST LUMP ON LEFT SIDE AT 3 O'CLOCK POSITION: Primary | ICD-10-CM

## 2023-02-06 DIAGNOSIS — N64.4 MASTALGIA: ICD-10-CM

## 2023-02-06 PROCEDURE — 99213 OFFICE O/P EST LOW 20 MIN: CPT | Performed by: NURSE PRACTITIONER

## 2023-02-06 NOTE — PROGRESS NOTES
"GYN Problem/Follow Up Visit    Chief Complaint   Patient presents with   • Follow-up     Left breast pain/bump or hard spot            HPI  Sailaja Mercer is a 37 y.o. female, , who presents for left breast nodule x 1 month, tender, no trauma, no rash, no nipple discharge       Family history of breast cancer, mom, MGM, previous genetic testing- increased risk breast cancer. Annual screening mammogram and breast mri - benign findings, previous bilateral breast biopsies with marker placed- benign    Additional OB/GYN History   Patient's last menstrual period was 2023.  Current contraception: contraceptive methods: OCP (estrogen/progesterone)    Past Medical History:   Diagnosis Date   • Abnormal Pap smear of cervix    • Anxiety    • Cancer (HCC)    • CML (chronic myelocytic leukemia) (HCC)     2+ years ago   • CML (chronic myelocytic leukemia) (HCC) 07/15/2021      Past Surgical History:   Procedure Laterality Date   • APPENDECTOMY  2012   • BREAST BIOPSY Bilateral    • COLONOSCOPY     • ENDOSCOPY        Family History   Problem Relation Age of Onset   • Breast cancer Mother 42   • Lung cancer Paternal Grandmother    • Breast cancer Maternal Grandmother 50   • Ovarian cancer Maternal Grandmother    • Uterine cancer Neg Hx    • Prostate cancer Neg Hx    • Colon cancer Neg Hx      Allergies as of 2023   • (No Known Allergies)      The additional following portions of the patient's history were reviewed and updated as appropriate: allergies, current medications, past family history, past medical history, past social history, past surgical history and problem list.    Review of Systems    I have reviewed and agree with the HPI, ROS, and historical information as entered above. Alysha Lux, APRN    Objective   /83   Pulse 89   Ht 152.4 cm (60\")   Wt 55.1 kg (121 lb 6.4 oz)   LMP 2023   BMI 23.71 kg/m²     Physical Exam  Vitals and nursing note reviewed. Exam " conducted with a chaperone present.   Constitutional:       General: She is not in acute distress.     Appearance: Normal appearance. She is well-groomed.   Cardiovascular:      Rate and Rhythm: Normal rate and regular rhythm.      Heart sounds: Normal heart sounds.   Pulmonary:      Effort: Pulmonary effort is normal.      Breath sounds: Normal breath sounds.   Chest:   Breasts:     Breasts are symmetrical.      Right: Normal. No inverted nipple, mass, nipple discharge, skin change or tenderness.      Left: Mass (nodular breast tissue, tenderness 3-4 o'clock position left lateral breast) and tenderness present. No inverted nipple, nipple discharge or skin change.       Lymphadenopathy:      Cervical: No cervical adenopathy.      Upper Body:      Right upper body: No supraclavicular, axillary or pectoral adenopathy.      Left upper body: No supraclavicular, axillary or pectoral adenopathy.   Neurological:      Mental Status: She is alert.   Psychiatric:         Behavior: Behavior is cooperative.            Assessment and Plan    Diagnoses and all orders for this visit:    1. Breast lump on left side at 3 o'clock position (Primary)  -     Mammo Diagnostic Digital Tomosynthesis Left With CAD; Future  -     US Breast Left Limited; Future    2. Mastalgia  -     Mammo Diagnostic Digital Tomosynthesis Left With CAD; Future  -     US Breast Left Limited; Future    3. At high risk for breast cancer  -     Mammo Diagnostic Digital Tomosynthesis Left With CAD; Future  -     US Breast Left Limited; Future        Counseling:  Schedule breast diagnostic mammogram left, focused ultrasound left  Will call with results    She understands the importance of having the above orders performed in a timely fashion.  The risks of not performing them include, but are not limited to, cancer and/or subsequent increase in morbidity and/or mortality.  She is encouraged to review her results online and/or contact or office if she has questions.      Follow Up:  Return if symptoms worsen or fail to improve.        Alysha Lux, APRN  02/06/2023

## 2023-02-07 ENCOUNTER — SPECIALTY PHARMACY (OUTPATIENT)
Dept: PHARMACY | Facility: HOSPITAL | Age: 38
End: 2023-02-07
Payer: COMMERCIAL

## 2023-02-21 LAB — REF LAB TEST METHOD: NORMAL

## 2023-02-27 ENCOUNTER — OFFICE VISIT (OUTPATIENT)
Dept: ORTHOPEDIC SURGERY | Facility: CLINIC | Age: 38
End: 2023-02-27
Payer: COMMERCIAL

## 2023-02-27 VITALS — WEIGHT: 121 LBS | OXYGEN SATURATION: 98 % | HEART RATE: 89 BPM | BODY MASS INDEX: 23.75 KG/M2 | HEIGHT: 60 IN

## 2023-02-27 DIAGNOSIS — M25.552 LEFT HIP PAIN: Primary | ICD-10-CM

## 2023-02-27 PROCEDURE — 99213 OFFICE O/P EST LOW 20 MIN: CPT | Performed by: ORTHOPAEDIC SURGERY

## 2023-02-27 RX ORDER — CETIRIZINE HYDROCHLORIDE 10 MG/1
10 TABLET ORAL DAILY
COMMUNITY

## 2023-02-27 NOTE — PROGRESS NOTES
"Chief Complaint  Initial Evaluation and Pain of the Left Hip     Subjective      Sailaja Mercer presents to [facility] for evaluation of her left hip. She reports that this has been bothering her since October. She reports that she has been using over the counter medications for pain control and ice. She reports no injury, trauma or falls to her hip.       No Known Allergies     Social History     Socioeconomic History   • Marital status:    Tobacco Use   • Smoking status: Never   • Smokeless tobacco: Never   Vaping Use   • Vaping Use: Never used   Substance and Sexual Activity   • Alcohol use: Never     Comment: Current some day   • Drug use: Never   • Sexual activity: Yes     Partners: Male     Birth control/protection: OCP        Review of Systems     Objective   Vital Signs:   Pulse 89   Ht 152.4 cm (60\")   Wt 54.9 kg (121 lb)   SpO2 98%   BMI 23.63 kg/m²       Physical Exam  Constitutional:       Appearance: Normal appearance. Patient is well-developed and normal weight.   HENT:      Head: Normocephalic.      Right Ear: Hearing and external ear normal.      Left Ear: Hearing and external ear normal.      Nose: Nose normal.   Eyes:      Conjunctiva/sclera: Conjunctivae normal.   Cardiovascular:      Rate and Rhythm: Normal rate.   Pulmonary:      Effort: Pulmonary effort is normal.      Breath sounds: No wheezing or rales.   Abdominal:      Palpations: Abdomen is soft.      Tenderness: There is no abdominal tenderness.   Musculoskeletal:      Cervical back: Normal range of motion.   Skin:     Findings: No rash.   Neurological:      Mental Status: Patient  is alert and oriented to person, place, and time.   Psychiatric:         Mood and Affect: Mood and affect normal.         Judgment: Judgment normal.       Ortho Exam      Left lower extremity: skin is warm, dry and intact, full flexion and extension to the left knee, neurovascularly intact, sensation intact, good " strength to the hip flexors, extensors, abductor and adductors, good quad and hamstring strength, no groin pain, tender to palpation to the hip flexors, calf soft       Procedures        Imaging Results (Most Recent)     Procedure Component Value Units Date/Time    XR Hip With or Without Pelvis 2 - 3 View Left [[id number]] Resulted: 02/27/23 1604     Updated: 02/27/23 1605           Result Review :     X-Ray Report:  Left hip X-Ray  Indication: Evaluation of left hip pain   AP/Lateral view(s)  Findings: no acute fracture, no arthritis   Prior studies available for comparison: no         No results found.           Assessment and Plan     Diagnoses and all orders for this visit:    1. Left hip pain (Primary)  -     XR Hip With or Without Pelvis 2 - 3 View Left        Discussed the treatment plan with the patient. X-rays were obtained today in the office and these were reviewed with the patient today. Order outpatient physical therapy for her today. She will call with any questions or concerns.     Call or return if worsening symptoms.    Follow Up     PRN       Patient was given instructions and counseling regarding her condition or for health maintenance advice. Please see specific information pulled into the AVS if appropriate.     Scribed for Eric Devine MD by Amy Wang.  02/27/23   16:08 EST    I have personally performed the services described in this document as scribed by the above individual and it is both accurate and complete. Eric Devine MD 02/27/23

## 2023-03-03 ENCOUNTER — HOSPITAL ENCOUNTER (OUTPATIENT)
Dept: MAMMOGRAPHY | Facility: HOSPITAL | Age: 38
Discharge: HOME | End: 2023-03-03
Payer: COMMERCIAL

## 2023-03-03 ENCOUNTER — HOSPITAL ENCOUNTER (OUTPATIENT)
Dept: ULTRASOUND IMAGING | Facility: HOSPITAL | Age: 38
Discharge: HOME | End: 2023-03-03
Payer: COMMERCIAL

## 2023-03-03 DIAGNOSIS — Z91.89 AT HIGH RISK FOR BREAST CANCER: ICD-10-CM

## 2023-03-03 DIAGNOSIS — N64.4 MASTALGIA: ICD-10-CM

## 2023-03-03 DIAGNOSIS — N63.25 BREAST LUMP ON LEFT SIDE AT 3 O'CLOCK POSITION: ICD-10-CM

## 2023-03-03 PROCEDURE — 77065 DX MAMMO INCL CAD UNI: CPT

## 2023-03-03 PROCEDURE — 76642 ULTRASOUND BREAST LIMITED: CPT

## 2023-03-03 PROCEDURE — G0279 TOMOSYNTHESIS, MAMMO: HCPCS

## 2023-03-06 ENCOUNTER — TELEPHONE (OUTPATIENT)
Dept: OBSTETRICS AND GYNECOLOGY | Facility: CLINIC | Age: 38
End: 2023-03-06
Payer: COMMERCIAL

## 2023-03-06 NOTE — TELEPHONE ENCOUNTER
----- Message from MYRON Bond sent at 3/3/2023 10:08 PM EST -----  In the area of concern in the left upper outer breast, breast imaging identifies benign fibroglandular tissue.   If she continues to experience discomfort in addition to her known family history, I would recommend referral to Erma Quiros, breast surgeon,  to discuss further. Let me know if referral desired. Otherwise we will continue with annual mammography and breast MRI screening, let me know of any questions or concerns.

## 2023-03-08 ENCOUNTER — LAB (OUTPATIENT)
Dept: LAB | Facility: HOSPITAL | Age: 38
End: 2023-03-08
Payer: COMMERCIAL

## 2023-03-08 DIAGNOSIS — C92.10 CML (CHRONIC MYELOCYTIC LEUKEMIA): ICD-10-CM

## 2023-03-08 LAB
ALBUMIN SERPL-MCNC: 4.6 G/DL (ref 3.5–5.2)
ALBUMIN/GLOB SERPL: 2 G/DL
ALP SERPL-CCNC: 53 U/L (ref 39–117)
ALT SERPL W P-5'-P-CCNC: 14 U/L (ref 1–33)
ANION GAP SERPL CALCULATED.3IONS-SCNC: 10.2 MMOL/L (ref 5–15)
AST SERPL-CCNC: 17 U/L (ref 1–32)
BASOPHILS # BLD AUTO: 0.02 10*3/MM3 (ref 0–0.2)
BASOPHILS NFR BLD AUTO: 0.4 % (ref 0–1.5)
BILIRUB SERPL-MCNC: 0.6 MG/DL (ref 0–1.2)
BUN SERPL-MCNC: 13 MG/DL (ref 6–20)
BUN/CREAT SERPL: 15.1 (ref 7–25)
CALCIUM SPEC-SCNC: 9 MG/DL (ref 8.6–10.5)
CHLORIDE SERPL-SCNC: 104 MMOL/L (ref 98–107)
CO2 SERPL-SCNC: 25.8 MMOL/L (ref 22–29)
CREAT SERPL-MCNC: 0.86 MG/DL (ref 0.57–1)
DEPRECATED RDW RBC AUTO: 47.7 FL (ref 37–54)
EGFRCR SERPLBLD CKD-EPI 2021: 89.4 ML/MIN/1.73
EOSINOPHIL # BLD AUTO: 0.06 10*3/MM3 (ref 0–0.4)
EOSINOPHIL NFR BLD AUTO: 1.1 % (ref 0.3–6.2)
ERYTHROCYTE [DISTWIDTH] IN BLOOD BY AUTOMATED COUNT: 13.2 % (ref 12.3–15.4)
GLOBULIN UR ELPH-MCNC: 2.3 GM/DL
GLUCOSE SERPL-MCNC: 112 MG/DL (ref 65–99)
HCT VFR BLD AUTO: 42.8 % (ref 34–46.6)
HGB BLD-MCNC: 14.6 G/DL (ref 12–15.9)
IMM GRANULOCYTES # BLD AUTO: 0.01 10*3/MM3 (ref 0–0.05)
IMM GRANULOCYTES NFR BLD AUTO: 0.2 % (ref 0–0.5)
LYMPHOCYTES # BLD AUTO: 1.71 10*3/MM3 (ref 0.7–3.1)
LYMPHOCYTES NFR BLD AUTO: 30.7 % (ref 19.6–45.3)
MCH RBC QN AUTO: 33 PG (ref 26.6–33)
MCHC RBC AUTO-ENTMCNC: 34.1 G/DL (ref 31.5–35.7)
MCV RBC AUTO: 96.6 FL (ref 79–97)
MONOCYTES # BLD AUTO: 0.27 10*3/MM3 (ref 0.1–0.9)
MONOCYTES NFR BLD AUTO: 4.8 % (ref 5–12)
NEUTROPHILS NFR BLD AUTO: 3.5 10*3/MM3 (ref 1.7–7)
NEUTROPHILS NFR BLD AUTO: 62.8 % (ref 42.7–76)
NRBC BLD AUTO-RTO: 0 /100 WBC (ref 0–0.2)
PLATELET # BLD AUTO: 288 10*3/MM3 (ref 140–450)
PMV BLD AUTO: 10 FL (ref 6–12)
POTASSIUM SERPL-SCNC: 3.9 MMOL/L (ref 3.5–5.2)
PROT SERPL-MCNC: 6.9 G/DL (ref 6–8.5)
RBC # BLD AUTO: 4.43 10*6/MM3 (ref 3.77–5.28)
SODIUM SERPL-SCNC: 140 MMOL/L (ref 136–145)
WBC NRBC COR # BLD: 5.57 10*3/MM3 (ref 3.4–10.8)

## 2023-03-08 PROCEDURE — 80053 COMPREHEN METABOLIC PANEL: CPT

## 2023-03-08 PROCEDURE — 85025 COMPLETE CBC W/AUTO DIFF WBC: CPT

## 2023-03-08 PROCEDURE — 36415 COLL VENOUS BLD VENIPUNCTURE: CPT

## 2023-03-13 ENCOUNTER — TELEPHONE (OUTPATIENT)
Dept: ONCOLOGY | Facility: HOSPITAL | Age: 38
End: 2023-03-13

## 2023-03-13 LAB — REF LAB TEST METHOD: NORMAL

## 2023-03-13 NOTE — TELEPHONE ENCOUNTER
Caller: Sailaja Mercer    Relationship to patient: Self    Best call back number: [phone]      Type of visit: F.U 1    Requested date: CALL TO R/S     If rescheduling, when is the original appointment: 3/15/2023

## 2023-03-15 ENCOUNTER — TELEPHONE (OUTPATIENT)
Dept: OBSTETRICS AND GYNECOLOGY | Facility: CLINIC | Age: 38
End: 2023-03-15
Payer: COMMERCIAL

## 2023-03-15 DIAGNOSIS — N64.4 BREAST PAIN: Primary | ICD-10-CM

## 2023-03-15 DIAGNOSIS — N63.25 BREAST LUMP ON LEFT SIDE AT 3 O'CLOCK POSITION: ICD-10-CM

## 2023-03-15 NOTE — TELEPHONE ENCOUNTER
Left a message for the patient to discuss her mammogram results. Letter sent to contact the office.

## 2023-03-23 DIAGNOSIS — Z30.09 BIRTH CONTROL COUNSELING: ICD-10-CM

## 2023-03-24 NOTE — TELEPHONE ENCOUNTER
The pharmacy is requesting an advanced refill of her medication Lo Loestrin Fe 1 mg.  She last picked up a refill on 03/20/23 and her next appointment is scheduled for 06/02/23.  I tried to contact the patient and left a voicemail message.  I am trying to find out if she was given a 3-pack supply.  If so, will wait for her prescription to be renewed at her follow up appointment.

## 2023-04-03 ENCOUNTER — OFFICE VISIT (OUTPATIENT)
Dept: SURGERY | Facility: CLINIC | Age: 38
End: 2023-04-03
Payer: COMMERCIAL

## 2023-04-03 VITALS — HEIGHT: 60 IN | BODY MASS INDEX: 23.95 KG/M2 | RESPIRATION RATE: 16 BRPM | WEIGHT: 122 LBS

## 2023-04-03 DIAGNOSIS — N63.20 MASS OF LEFT BREAST, UNSPECIFIED QUADRANT: Primary | ICD-10-CM

## 2023-04-03 PROCEDURE — 99203 OFFICE O/P NEW LOW 30 MIN: CPT | Performed by: SURGERY

## 2023-04-03 NOTE — H&P (VIEW-ONLY)
Chief Complaint: Breast Mass    Subjective         History of Present Illness  Sailaja Mercer is a 38 y.o. female presents to [facility] to be seen for left breast mass.  She has a significant family hx of breast cancer with her mother developing it in her 40s and her aunt as well ( at a slightly later age) and her maternal grandmother being diagnosed in her 50s and then having a recurrence.  She had a limited genetic panel several years ago via a skin punch due to her leukemia that was negative.   She is in a high risk screening program and gets annual MRIs.  Her recent imaging is shown below:    Narrative & Impression   PROCEDURE:  MAMMO DIAGNOSTIC DIGITAL TOMOSYNTHESIS LEFT W CAD, 3/03/2023, 12:10  US BREAST LEFT LIMITED, 3/03/2023, 12:27     VIEWS:  DIAGNOSTIC VIEWS WERE OBTAINED UTILIZING 3D TOMOSYNTHESIS AND R2 CAD SOFTWARE     INDICATIONS:  breast lump, breast pain, 3-4 o'clock left lateral breast     FINDINGS:          Left breast:  Triangular marker designates an area of palpable complaint upper-outer left breast.    The breasts are extremely dense.  There is no persistent mammographic abnormality in the area of   palpable complaint.  Stable position of left breast biopsy clip.     High-resolution focused left breast ultrasound at the 2 o'clock position 6 cm from the nipple (palpable) demonstrates a ridge of fibroglandular tissue without abnormal shadowing or suspicious   mass.     IMPRESSION:  Left breast:  A ridge of fibroglandular tissue accounts for the patient's palpable complaint.    Benign mammogram.  Given high density mammogram, biopsy of a clinically apparent lesion should not be deferred based on negative mammogram or ultrasound.     RECOMMENDATION(S):               ROUTINE MAMMOGRAM AND CLINICAL EVALUATION IN 12 MONTHS.       BIRADS:               DIAGNOSTIC CATEGORY 1--NEGATIVE.       BREAST COMPOSITION:          Extremely dense, which lowers the sensitivity of  mammography.     PLEASE NOTE:  A NORMAL MAMMOGRAM DOES NOT EXCLUDE THE POSSIBILITY OF BREAST CANCER.   ANY CLINICALLY SUSPICIOUS PALPABLE LUMP SHOULD BE BIOPSIED.            Narrative & Impression   PROCEDURE:  MRI BREAST BILATERAL W WO CONTRAST     COMPARISON: [facility], MR, BREAST BILATERAL W/WO CONTRAST, 4/13/2021, 15:57.  [facility], MR, MRI BREAST BILATERAL W WO CONTRAST, 12/28/2021, 9:48.  [facility], MG, MAMMO SCREENING DIGITAL TOMOSYNTHESIS BILATERAL W CAD, 10/12/2022, 7:18.     INDICATIONS:  Breast cancer screening, high-risk. Follow-up exam.     CONTRAST:      10ML  Multihance I.V.     TECHNIQUE:    Breast MRI was performed using dynamic intravenous gadolinium infusion, thin sections,   and a dedicated breast coil.  Contrast enhancement analysis was assisted by computer-aided   detection.       AMOUNT OF FIBROGLANDULAR TISSUE:        Extreme fibroglandular tissue        BACKGROUND PARENCHYMAL ENHANCEMENT:       Mild symmetric        FINDINGS:  The cardiac bolus is adequate.     There are signal voids from biopsy clips in each breast from previous benign MR directed biopsies.     Right breast:  5 mm oval mass in the lateral central right breast posterior depth is stable most   compatible with intramammary lymph node.  No suspicious mass or non mass enhancement within the   right breast.  No axillary or internal mammary chain adenopathy.  No suspicious skin or nipple   enhancement.     Left breast:  The previously seen enhancing foci within the left breast are stable.  No suspicious   mass or non mass enhancement in the left breast.  Along the superior posterior margin of the left   breast there is apparent skin thickening and what is likely Sydnee artifact related to contact of the   skin against the breast coil.  No suspicious T2 signal to suggest edema in this location.  Also, no   abnormal skin thickening on the patient's screening mammogram ( 10/12/2022) .   No pathologically   enlarged axillary or internal mammary chain nodes.  No suspicious nipple enhancement.     No pectoralis or chest wall enhancement.     IMPRESSION:  Right breast:  Within normal limits.     Left breast:  Apparent skin thickening superior posterior left breast is most compatible with Sydnee   artifact related to skin contact with the breast coil.  Otherwise within normal limits.     Given high risk and extreme breast density consider annual breast MRI screening.     BIRADS:  DIAGNOSTIC CATEGORY 2--BENIGN FINDING       RECOMMENDATION(S):  ROUTINE MAMMOGRAM AND CLINICAL EVALUATION IN 12 MONTHS.                      The palpable area of concern is in the same region that the area of questionable skin thickening and artifact was on the MRI and her mammogram was also not a very definitive read due to the density of her breast.  The area in question has been palpable on exam to the patient since December and is different from the surrounding breast tissue.        Objective     Past Medical History:   Diagnosis Date   • Abnormal Pap smear of cervix    • Anxiety    • Cancer    • CML (chronic myelocytic leukemia)     2+ years ago   • CML (chronic myelocytic leukemia) 07/15/2021       Past Surgical History:   Procedure Laterality Date   • APPENDECTOMY  06/30/2012   • BREAST BIOPSY Bilateral    • COLONOSCOPY  2015   • ENDOSCOPY  2015         Current Outpatient Medications:   •  albuterol sulfate  (90 Base) MCG/ACT inhaler, INHALE 1 TO 2 PUFFS BY MOUTH FOUR TIMES DAILY AS NEEDED, Disp: , Rfl:   •  cetirizine (zyrTEC) 10 MG tablet, Take 1 tablet by mouth Daily., Disp: , Rfl:   •  hydroCHLOROthiazide (HYDRODIURIL) 12.5 MG tablet, Take 1 tablet by mouth Daily., Disp: , Rfl:   •  imatinib (Gleevec) 400 MG chemo tablet, Take 1 tablet by mouth once daily, Disp: 30 tablet, Rfl: 11  •  Lo Loestrin Fe 1 MG-10 MCG / 10 MCG tablet, TAKE 1 TABLET BY MOUTH DAILY, Disp: 28 tablet, Rfl: 11  •  ondansetron (ZOFRAN) 4 MG  "tablet, Take 2 tablets by mouth Every 8 (Eight) Hours As Needed for Nausea or Vomiting., Disp: 30 tablet, Rfl: 3  •  tretinoin (RETIN-A) 0.025 % cream, APPLY PEA SIZED AMOUNT TOPICALLY TO FACE EVERY NIGHT AT BEDTIME AFTER WASHING THE FACE, Disp: , Rfl:     No Known Allergies     Family History   Problem Relation Age of Onset   • Breast cancer Mother 42   • Lung cancer Paternal Grandmother    • Breast cancer Maternal Grandmother 50   • Ovarian cancer Maternal Grandmother    • Uterine cancer Neg Hx    • Prostate cancer Neg Hx    • Colon cancer Neg Hx        Social History     Socioeconomic History   • Marital status:    Tobacco Use   • Smoking status: Never   • Smokeless tobacco: Never   Vaping Use   • Vaping Use: Never used   Substance and Sexual Activity   • Alcohol use: Never     Comment: Current some day   • Drug use: Never   • Sexual activity: Yes     Partners: Male     Birth control/protection: OCP       Vital Signs:   Resp 16   Ht 152.4 cm (60\")   Wt 55.3 kg (122 lb)   BMI 23.83 kg/m²    Review of Systems    Physical Exam  Vitals and nursing note reviewed.   Constitutional:       Appearance: Normal appearance.   HENT:      Head: Normocephalic and atraumatic.   Eyes:      Extraocular Movements: Extraocular movements intact.      Pupils: Pupils are equal, round, and reactive to light.   Cardiovascular:      Pulses: Normal pulses.   Pulmonary:      Effort: Pulmonary effort is normal. No accessory muscle usage or respiratory distress.   Chest:   Breasts:     Right: Normal. No inverted nipple, nipple discharge or skin change.      Left: Mass present. No inverted nipple, nipple discharge or skin change.      Comments: Palpable area of density/mass in superior posterior left breast   Abdominal:      General: Abdomen is flat.      Palpations: Abdomen is soft.      Tenderness: There is no abdominal tenderness. There is no guarding.   Musculoskeletal:         General: No swelling, tenderness or deformity.      " Cervical back: Neck supple.   Lymphadenopathy:      Upper Body:      Right upper body: No supraclavicular or axillary adenopathy.      Left upper body: No supraclavicular or axillary adenopathy.   Skin:     General: Skin is warm and dry.   Neurological:      General: No focal deficit present.      Mental Status: She is alert and oriented to person, place, and time.   Psychiatric:         Mood and Affect: Mood normal.         Thought Content: Thought content normal.          Result Review :               Assessment and Plan    Diagnoses and all orders for this visit:    1. Mass of left breast, unspecified quadrant (Primary)  -     Case Request; Standing  -     Follow Anesthesia Guidelines / Protocol; Standing  -     Obtain Informed Consent; Standing  -     Place Sequential Compression Device in Pre-Op; Standing  -     LMX Cream Not Needed For Needle Localization Procedure in Radiology; Standing  -     Contact Surgeon With Questions or Concerns; Standing  -     Verify / Perform Chlorhexidine Skin Prep; Standing  -     ceFAZolin (ANCEF) 2 g in sodium chloride 0.9 % 100 mL IVPB  -     Case Request    Will plan for left breast mass excision on 4/14        Follow Up   Return for Next scheduled followup after surgery.  Patient was given instructions and counseling regarding her condition or for health maintenance advice. Please see specific information pulled into the AVS if appropriate.         This document has been electronically signed by Erma Quiros MD  April 3, 2023 13:05 EDT

## 2023-04-03 NOTE — PROGRESS NOTES
Chief Complaint: Breast Mass    Subjective         History of Present Illness  Sailaja Mercer is a 38 y.o. female presents to [facility] to be seen for left breast mass.  She has a significant family hx of breast cancer with her mother developing it in her 40s and her aunt as well ( at a slightly later age) and her maternal grandmother being diagnosed in her 50s and then having a recurrence.  She had a limited genetic panel several years ago via a skin punch due to her leukemia that was negative.   She is in a high risk screening program and gets annual MRIs.  Her recent imaging is shown below:    Narrative & Impression   PROCEDURE:  MAMMO DIAGNOSTIC DIGITAL TOMOSYNTHESIS LEFT W CAD, 3/03/2023, 12:10  US BREAST LEFT LIMITED, 3/03/2023, 12:27     VIEWS:  DIAGNOSTIC VIEWS WERE OBTAINED UTILIZING 3D TOMOSYNTHESIS AND R2 CAD SOFTWARE     INDICATIONS:  breast lump, breast pain, 3-4 o'clock left lateral breast     FINDINGS:          Left breast:  Triangular marker designates an area of palpable complaint upper-outer left breast.    The breasts are extremely dense.  There is no persistent mammographic abnormality in the area of   palpable complaint.  Stable position of left breast biopsy clip.     High-resolution focused left breast ultrasound at the 2 o'clock position 6 cm from the nipple (palpable) demonstrates a ridge of fibroglandular tissue without abnormal shadowing or suspicious   mass.     IMPRESSION:  Left breast:  A ridge of fibroglandular tissue accounts for the patient's palpable complaint.    Benign mammogram.  Given high density mammogram, biopsy of a clinically apparent lesion should not be deferred based on negative mammogram or ultrasound.     RECOMMENDATION(S):               ROUTINE MAMMOGRAM AND CLINICAL EVALUATION IN 12 MONTHS.       BIRADS:               DIAGNOSTIC CATEGORY 1--NEGATIVE.       BREAST COMPOSITION:          Extremely dense, which lowers the sensitivity of  mammography.     PLEASE NOTE:  A NORMAL MAMMOGRAM DOES NOT EXCLUDE THE POSSIBILITY OF BREAST CANCER.   ANY CLINICALLY SUSPICIOUS PALPABLE LUMP SHOULD BE BIOPSIED.            Narrative & Impression   PROCEDURE:  MRI BREAST BILATERAL W WO CONTRAST     COMPARISON: [facility], MR, BREAST BILATERAL W/WO CONTRAST, 4/13/2021, 15:57.  [facility], MR, MRI BREAST BILATERAL W WO CONTRAST, 12/28/2021, 9:48.  [facility], MG, MAMMO SCREENING DIGITAL TOMOSYNTHESIS BILATERAL W CAD, 10/12/2022, 7:18.     INDICATIONS:  Breast cancer screening, high-risk. Follow-up exam.     CONTRAST:      10ML  Multihance I.V.     TECHNIQUE:    Breast MRI was performed using dynamic intravenous gadolinium infusion, thin sections,   and a dedicated breast coil.  Contrast enhancement analysis was assisted by computer-aided   detection.       AMOUNT OF FIBROGLANDULAR TISSUE:        Extreme fibroglandular tissue        BACKGROUND PARENCHYMAL ENHANCEMENT:       Mild symmetric        FINDINGS:  The cardiac bolus is adequate.     There are signal voids from biopsy clips in each breast from previous benign MR directed biopsies.     Right breast:  5 mm oval mass in the lateral central right breast posterior depth is stable most   compatible with intramammary lymph node.  No suspicious mass or non mass enhancement within the   right breast.  No axillary or internal mammary chain adenopathy.  No suspicious skin or nipple   enhancement.     Left breast:  The previously seen enhancing foci within the left breast are stable.  No suspicious   mass or non mass enhancement in the left breast.  Along the superior posterior margin of the left   breast there is apparent skin thickening and what is likely Sydnee artifact related to contact of the   skin against the breast coil.  No suspicious T2 signal to suggest edema in this location.  Also, no   abnormal skin thickening on the patient's screening mammogram ( 10/12/2022) .   No pathologically   enlarged axillary or internal mammary chain nodes.  No suspicious nipple enhancement.     No pectoralis or chest wall enhancement.     IMPRESSION:  Right breast:  Within normal limits.     Left breast:  Apparent skin thickening superior posterior left breast is most compatible with Sydnee   artifact related to skin contact with the breast coil.  Otherwise within normal limits.     Given high risk and extreme breast density consider annual breast MRI screening.     BIRADS:  DIAGNOSTIC CATEGORY 2--BENIGN FINDING       RECOMMENDATION(S):  ROUTINE MAMMOGRAM AND CLINICAL EVALUATION IN 12 MONTHS.                      The palpable area of concern is in the same region that the area of questionable skin thickening and artifact was on the MRI and her mammogram was also not a very definitive read due to the density of her breast.  The area in question has been palpable on exam to the patient since December and is different from the surrounding breast tissue.        Objective     Past Medical History:   Diagnosis Date   • Abnormal Pap smear of cervix    • Anxiety    • Cancer    • CML (chronic myelocytic leukemia)     2+ years ago   • CML (chronic myelocytic leukemia) 07/15/2021       Past Surgical History:   Procedure Laterality Date   • APPENDECTOMY  06/30/2012   • BREAST BIOPSY Bilateral    • COLONOSCOPY  2015   • ENDOSCOPY  2015         Current Outpatient Medications:   •  albuterol sulfate  (90 Base) MCG/ACT inhaler, INHALE 1 TO 2 PUFFS BY MOUTH FOUR TIMES DAILY AS NEEDED, Disp: , Rfl:   •  cetirizine (zyrTEC) 10 MG tablet, Take 1 tablet by mouth Daily., Disp: , Rfl:   •  hydroCHLOROthiazide (HYDRODIURIL) 12.5 MG tablet, Take 1 tablet by mouth Daily., Disp: , Rfl:   •  imatinib (Gleevec) 400 MG chemo tablet, Take 1 tablet by mouth once daily, Disp: 30 tablet, Rfl: 11  •  Lo Loestrin Fe 1 MG-10 MCG / 10 MCG tablet, TAKE 1 TABLET BY MOUTH DAILY, Disp: 28 tablet, Rfl: 11  •  ondansetron (ZOFRAN) 4 MG  "tablet, Take 2 tablets by mouth Every 8 (Eight) Hours As Needed for Nausea or Vomiting., Disp: 30 tablet, Rfl: 3  •  tretinoin (RETIN-A) 0.025 % cream, APPLY PEA SIZED AMOUNT TOPICALLY TO FACE EVERY NIGHT AT BEDTIME AFTER WASHING THE FACE, Disp: , Rfl:     No Known Allergies     Family History   Problem Relation Age of Onset   • Breast cancer Mother 42   • Lung cancer Paternal Grandmother    • Breast cancer Maternal Grandmother 50   • Ovarian cancer Maternal Grandmother    • Uterine cancer Neg Hx    • Prostate cancer Neg Hx    • Colon cancer Neg Hx        Social History     Socioeconomic History   • Marital status:    Tobacco Use   • Smoking status: Never   • Smokeless tobacco: Never   Vaping Use   • Vaping Use: Never used   Substance and Sexual Activity   • Alcohol use: Never     Comment: Current some day   • Drug use: Never   • Sexual activity: Yes     Partners: Male     Birth control/protection: OCP       Vital Signs:   Resp 16   Ht 152.4 cm (60\")   Wt 55.3 kg (122 lb)   BMI 23.83 kg/m²    Review of Systems    Physical Exam  Vitals and nursing note reviewed.   Constitutional:       Appearance: Normal appearance.   HENT:      Head: Normocephalic and atraumatic.   Eyes:      Extraocular Movements: Extraocular movements intact.      Pupils: Pupils are equal, round, and reactive to light.   Cardiovascular:      Pulses: Normal pulses.   Pulmonary:      Effort: Pulmonary effort is normal. No accessory muscle usage or respiratory distress.   Chest:   Breasts:     Right: Normal. No inverted nipple, nipple discharge or skin change.      Left: Mass present. No inverted nipple, nipple discharge or skin change.      Comments: Palpable area of density/mass in superior posterior left breast   Abdominal:      General: Abdomen is flat.      Palpations: Abdomen is soft.      Tenderness: There is no abdominal tenderness. There is no guarding.   Musculoskeletal:         General: No swelling, tenderness or deformity.      " Cervical back: Neck supple.   Lymphadenopathy:      Upper Body:      Right upper body: No supraclavicular or axillary adenopathy.      Left upper body: No supraclavicular or axillary adenopathy.   Skin:     General: Skin is warm and dry.   Neurological:      General: No focal deficit present.      Mental Status: She is alert and oriented to person, place, and time.   Psychiatric:         Mood and Affect: Mood normal.         Thought Content: Thought content normal.          Result Review :               Assessment and Plan    Diagnoses and all orders for this visit:    1. Mass of left breast, unspecified quadrant (Primary)  -     Case Request; Standing  -     Follow Anesthesia Guidelines / Protocol; Standing  -     Obtain Informed Consent; Standing  -     Place Sequential Compression Device in Pre-Op; Standing  -     LMX Cream Not Needed For Needle Localization Procedure in Radiology; Standing  -     Contact Surgeon With Questions or Concerns; Standing  -     Verify / Perform Chlorhexidine Skin Prep; Standing  -     ceFAZolin (ANCEF) 2 g in sodium chloride 0.9 % 100 mL IVPB  -     Case Request    Will plan for left breast mass excision on 4/14        Follow Up   Return for Next scheduled followup after surgery.  Patient was given instructions and counseling regarding her condition or for health maintenance advice. Please see specific information pulled into the AVS if appropriate.         This document has been electronically signed by Erma Quiros MD  April 3, 2023 13:05 EDT

## 2023-04-04 ENCOUNTER — TELEPHONE (OUTPATIENT)
Dept: OBSTETRICS AND GYNECOLOGY | Facility: CLINIC | Age: 38
End: 2023-04-04
Payer: COMMERCIAL

## 2023-04-04 DIAGNOSIS — Z30.09 BIRTH CONTROL COUNSELING: ICD-10-CM

## 2023-04-04 RX ORDER — NORETHINDRONE ACETATE AND ETHINYL ESTRADIOL, ETHINYL ESTRADIOL AND FERROUS FUMARATE 1MG-10(24)
1 KIT ORAL DAILY
Qty: 28 TABLET | Refills: 11 | OUTPATIENT
Start: 2023-04-04

## 2023-04-04 RX ORDER — NORETHINDRONE ACETATE AND ETHINYL ESTRADIOL, ETHINYL ESTRADIOL AND FERROUS FUMARATE 1MG-10(24)
1 KIT ORAL DAILY
Qty: 28 TABLET | Refills: 1 | Status: SHIPPED | OUTPATIENT
Start: 2023-04-04

## 2023-04-10 ENCOUNTER — PATIENT OUTREACH (OUTPATIENT)
Dept: ONCOLOGY | Facility: HOSPITAL | Age: 38
End: 2023-04-10
Payer: COMMERCIAL

## 2023-04-10 RX ORDER — SACCHAROMYCES BOULARDII 250 MG
250 CAPSULE ORAL 2 TIMES DAILY
COMMUNITY

## 2023-04-10 NOTE — PRE-PROCEDURE INSTRUCTIONS
PRE-OP INSTRUCTIONS REVIEWED WITH PATIENT: FASTING/BATHING/ARRIVAL PROCEDURES.  INSTRUCTED TO TAKE A.M. DAY OF SURGERY: CETIRIZINE  UNDERSTANDING VERBALIZED.

## 2023-04-12 ENCOUNTER — OFFICE VISIT (OUTPATIENT)
Dept: ONCOLOGY | Facility: HOSPITAL | Age: 38
End: 2023-04-12
Payer: COMMERCIAL

## 2023-04-12 VITALS
SYSTOLIC BLOOD PRESSURE: 118 MMHG | TEMPERATURE: 98.5 F | RESPIRATION RATE: 16 BRPM | OXYGEN SATURATION: 100 % | WEIGHT: 119.27 LBS | BODY MASS INDEX: 23.29 KG/M2 | HEART RATE: 86 BPM | DIASTOLIC BLOOD PRESSURE: 74 MMHG

## 2023-04-12 DIAGNOSIS — C92.10 CML (CHRONIC MYELOCYTIC LEUKEMIA): Primary | ICD-10-CM

## 2023-04-12 PROCEDURE — 99214 OFFICE O/P EST MOD 30 MIN: CPT | Performed by: INTERNAL MEDICINE

## 2023-04-12 PROCEDURE — G0463 HOSPITAL OUTPT CLINIC VISIT: HCPCS | Performed by: INTERNAL MEDICINE

## 2023-04-12 NOTE — PROGRESS NOTES
Chief Complaint  CML    Silvino, Yamila KO, APRN    Alberto Mercer presents to [facility] for ongoing treatment of her CML.  She is on Gleevec.  She notes mild nausea with Gleevec but Zofran is helpful when needed.  She reports good energy level.  She denies excessive bruising or bleeding.  She denies fever, chills, L-spine night sweats or adenopathy.  She does note seasonal allergies especially with the tree pollens lately.      Oncology/Hematology History Overview Note   CML-dx June 2012            Clinical Staging      CHRONIC PHASE CML            Treatments      Chemotherapy      Began Gleevec Summer 2012        Cancer (Resolved)   6/28/2021 Initial Diagnosis    Cancer (CMS/HCC)     Leukemia (Resolved)   6/28/2021 Initial Diagnosis    Leukemia (CMS/HCC)     CML (chronic myelocytic leukemia)   7/15/2021 Initial Diagnosis    CML (chronic myelocytic leukemia) (CMS/HCC)     7/15/2021 Cancer Staged    Staging form: Chronic Myeloid Leukemia (Cml, AJCC 8th Edition  - Clinical: Ph+, Additional clonal changes: Absent - Signed by Sachin Walton MD on 7/15/2021      Chemotherapy    Gleevec initiated 2012         Review of Systems   Constitutional: Negative for appetite change, diaphoresis, fatigue, fever, unexpected weight gain and unexpected weight loss.   HENT: Negative for hearing loss, mouth sores, sore throat, swollen glands, trouble swallowing and voice change.    Eyes: Negative for blurred vision.   Respiratory: Negative for cough, shortness of breath and wheezing.    Cardiovascular: Negative for chest pain and palpitations.   Gastrointestinal: Negative for abdominal pain, blood in stool, constipation, diarrhea, nausea and vomiting.   Endocrine: Negative for cold intolerance and heat intolerance.   Genitourinary: Negative for difficulty urinating, dysuria, frequency, hematuria and urinary incontinence.   Musculoskeletal: Negative for arthralgias, back pain  and myalgias.   Skin: Negative for rash, skin lesions and wound.   Neurological: Negative for dizziness, seizures, weakness, numbness and headache.   Hematological: Does not bruise/bleed easily.   Psychiatric/Behavioral: Negative for depressed mood. The patient is not nervous/anxious.      Current Outpatient Medications on File Prior to Visit   Medication Sig Dispense Refill   • cetirizine (zyrTEC) 10 MG tablet Take 1 tablet by mouth Daily.     • imatinib (Gleevec) 400 MG chemo tablet Take 1 tablet by mouth once daily (Patient taking differently: Take 1 tablet by mouth Every Night.) 30 tablet 11   • Norethin-Eth Estrad-Fe Biphas (Lo Loestrin Fe) 1 MG-10 MCG / 10 MCG tablet Take 1 tablet by mouth Daily. (Patient taking differently: Take 1 tablet by mouth Every Night.) 28 tablet 1   • ondansetron (ZOFRAN) 4 MG tablet Take 2 tablets by mouth Every 8 (Eight) Hours As Needed for Nausea or Vomiting. 30 tablet 3   • saccharomyces boulardii (FLORASTOR) 250 MG capsule Take 1 capsule by mouth 2 (Two) Times a Day. LAST DOSE 4/9/23     • tretinoin (RETIN-A) 0.025 % cream APPLY PEA SIZED AMOUNT TOPICALLY TO FACE EVERY NIGHT AT BEDTIME AFTER WASHING THE FACE       No current facility-administered medications on file prior to visit.       Allergies   Allergen Reactions   • Nickel Rash     Past Medical History:   Diagnosis Date   • Abnormal Pap smear of cervix    • Anxiety    • Cancer     CML, CHRONIC LEUKEMIA ORAL MED, NO PROBLEMS   • CML (chronic myelocytic leukemia)     2+ years ago   • Seasonal allergies      Past Surgical History:   Procedure Laterality Date   • APPENDECTOMY  06/30/2012   • BREAST BIOPSY Bilateral    • COLONOSCOPY  2015   • ENDOSCOPY  2015     Social History     Socioeconomic History   • Marital status:    Tobacco Use   • Smoking status: Never   • Smokeless tobacco: Never   Vaping Use   • Vaping Use: Never used   Substance and Sexual Activity   • Alcohol use: Never     Comment: Current some day   • Drug  use: Never   • Sexual activity: Defer     Partners: Male     Birth control/protection: OCP     Family History   Problem Relation Age of Onset   • Breast cancer Mother 42   • Breast cancer Maternal Grandmother 50   • Ovarian cancer Maternal Grandmother    • Lung cancer Paternal Grandmother    • Uterine cancer Neg Hx    • Prostate cancer Neg Hx    • Colon cancer Neg Hx    • Malig Hyperthermia Neg Hx        Objective   Physical Exam  Vitals reviewed. Exam conducted with a chaperone present.   Constitutional:       General: She is not in acute distress.     Appearance: Normal appearance.   Cardiovascular:      Rate and Rhythm: Normal rate and regular rhythm.      Heart sounds: Normal heart sounds. No murmur heard.    No gallop.   Pulmonary:      Effort: Pulmonary effort is normal.      Breath sounds: Normal breath sounds.   Abdominal:      General: Abdomen is flat. Bowel sounds are normal.      Palpations: Abdomen is soft.   Musculoskeletal:      Cervical back: Neck supple.      Right lower leg: No edema.      Left lower leg: No edema.   Lymphadenopathy:      Cervical: No cervical adenopathy.   Neurological:      Mental Status: She is alert and oriented to person, place, and time.   Psychiatric:         Mood and Affect: Mood normal.         Behavior: Behavior normal.         Vitals:    04/12/23 0845   BP: 118/74   Pulse: 86   Resp: 16   Temp: 98.5 °F (36.9 °C)   TempSrc: Temporal   SpO2: 100%   Weight: 54.1 kg (119 lb 4.3 oz)   PainSc: 0-No pain     ECOG score: 0         PHQ-9 Total Score:                    Result Review :   The following data was reviewed by: Sachin Walton MD on 04/12/2023:  Lab Results   Component Value Date    HGB 14.6 03/08/2023    HCT 42.8 03/08/2023    MCV 96.6 03/08/2023     03/08/2023    WBC 5.57 03/08/2023    NEUTROABS 3.50 03/08/2023    LYMPHSABS 1.71 03/08/2023    MONOSABS 0.27 03/08/2023    EOSABS 0.06 03/08/2023    BASOSABS 0.02 03/08/2023     Lab Results   Component Value Date     GLUCOSE 112 (H) 03/08/2023    BUN 13 03/08/2023    CREATININE 0.86 03/08/2023     03/08/2023    K 3.9 03/08/2023     03/08/2023    CO2 25.8 03/08/2023    CALCIUM 9.0 03/08/2023    PROTEINTOT 6.9 03/08/2023    ALBUMIN 4.6 03/08/2023    BILITOT 0.6 03/08/2023    ALKPHOS 53 03/08/2023    AST 17 03/08/2023    ALT 14 03/08/2023     Lab Results   Component Value Date    TSH 0.796 11/11/2022     No results found for: IRON, LABIRON, TRANSFERRIN, TIBC  Lab Results   Component Value Date     07/26/2019    EXACUORU57 569 11/11/2022    FOLATE 12.40 11/11/2022     No results found for: PSA, CEA, AFP, ,   PCR for BCR/ABL negative          Assessment and Plan    Diagnoses and all orders for this visit:    1. CML (chronic myelocytic leukemia) (Primary)  Assessment & Plan:  Chronic phase.  Patient is on Gleevec.  Tolerating well.  Her blood work looks excellent with normal blood counts and PCR negative for BCR/ABL.  Continue current therapy.  I will see her back in 6 months for ongoing treatment with lab work prior including PCR    Orders:  -     CBC & Differential; Future  -     Comprehensive Metabolic Panel; Future  -     BCR-ABL1, CML / ALL, PCR, Quant; Future          Patient Follow Up: 6 months    Patient was given instructions and counseling regarding her condition or for health maintenance advice. Please see specific information pulled into the AVS if appropriate.     Sachin Walton MD    4/12/2023

## 2023-04-12 NOTE — ASSESSMENT & PLAN NOTE
Chronic phase.  Patient is on Gleevec.  Tolerating well.  Her blood work looks excellent with normal blood counts and PCR negative for BCR/ABL.  Continue current therapy.  I will see her back in 6 months for ongoing treatment with lab work prior including PCR

## 2023-04-13 ENCOUNTER — SPECIALTY PHARMACY (OUTPATIENT)
Dept: PHARMACY | Facility: HOSPITAL | Age: 38
End: 2023-04-13
Payer: COMMERCIAL

## 2023-04-14 ENCOUNTER — ANESTHESIA EVENT (OUTPATIENT)
Dept: PERIOP | Facility: HOSPITAL | Age: 38
End: 2023-04-14
Payer: COMMERCIAL

## 2023-04-14 ENCOUNTER — ANESTHESIA (OUTPATIENT)
Dept: PERIOP | Facility: HOSPITAL | Age: 38
End: 2023-04-14
Payer: COMMERCIAL

## 2023-04-14 ENCOUNTER — HOSPITAL ENCOUNTER (OUTPATIENT)
Facility: HOSPITAL | Age: 38
Setting detail: HOSPITAL OUTPATIENT SURGERY
Discharge: HOME | End: 2023-04-14
Attending: SURGERY | Admitting: SURGERY
Payer: COMMERCIAL

## 2023-04-14 VITALS
SYSTOLIC BLOOD PRESSURE: 135 MMHG | OXYGEN SATURATION: 100 % | HEIGHT: 60 IN | BODY MASS INDEX: 23.33 KG/M2 | RESPIRATION RATE: 16 BRPM | DIASTOLIC BLOOD PRESSURE: 80 MMHG | WEIGHT: 118.83 LBS | HEART RATE: 80 BPM | TEMPERATURE: 98.1 F

## 2023-04-14 DIAGNOSIS — N63.20 MASS OF LEFT BREAST, UNSPECIFIED QUADRANT: ICD-10-CM

## 2023-04-14 LAB — B-HCG UR QL: NEGATIVE

## 2023-04-14 PROCEDURE — 25010000002 GENTAMICIN PER 80 MG: Performed by: SURGERY

## 2023-04-14 PROCEDURE — 25010000002 FENTANYL CITRATE (PF) 50 MCG/ML SOLUTION: Performed by: NURSE ANESTHETIST, CERTIFIED REGISTERED

## 2023-04-14 PROCEDURE — 25010000002 DEXAMETHASONE PER 1 MG: Performed by: NURSE ANESTHETIST, CERTIFIED REGISTERED

## 2023-04-14 PROCEDURE — 81025 URINE PREGNANCY TEST: CPT | Performed by: SURGERY

## 2023-04-14 PROCEDURE — 88307 TISSUE EXAM BY PATHOLOGIST: CPT | Performed by: SURGERY

## 2023-04-14 PROCEDURE — 19120 REMOVAL OF BREAST LESION: CPT | Performed by: SURGERY

## 2023-04-14 PROCEDURE — 25010000002 PROPOFOL 10 MG/ML EMULSION: Performed by: NURSE ANESTHETIST, CERTIFIED REGISTERED

## 2023-04-14 PROCEDURE — 25010000002 KETOROLAC TROMETHAMINE PER 15 MG: Performed by: NURSE ANESTHETIST, CERTIFIED REGISTERED

## 2023-04-14 PROCEDURE — C1889 IMPLANT/INSERT DEVICE, NOC: HCPCS | Performed by: SURGERY

## 2023-04-14 PROCEDURE — 25010000002 MIDAZOLAM PER 1MG: Performed by: ANESTHESIOLOGY

## 2023-04-14 PROCEDURE — 25010000002 CEFAZOLIN IN DEXTROSE 2-4 GM/100ML-% SOLUTION: Performed by: SURGERY

## 2023-04-14 DEVICE — SEAL HEMO SURG ARISTA/AH ABS/PWDR 3GM: Type: IMPLANTABLE DEVICE | Site: BREAST | Status: FUNCTIONAL

## 2023-04-14 DEVICE — LIGACLIP MCA MULTIPLE CLIP APPLIERS, 20 MEDIUM CLIPS
Type: IMPLANTABLE DEVICE | Site: BREAST | Status: FUNCTIONAL
Brand: LIGACLIP

## 2023-04-14 RX ORDER — PROMETHAZINE HYDROCHLORIDE 25 MG/1
25 SUPPOSITORY RECTAL ONCE AS NEEDED
Status: DISCONTINUED | OUTPATIENT
Start: 2023-04-14 | End: 2023-04-14 | Stop reason: HOSPADM

## 2023-04-14 RX ORDER — MAGNESIUM HYDROXIDE 1200 MG/15ML
LIQUID ORAL AS NEEDED
Status: DISCONTINUED | OUTPATIENT
Start: 2023-04-14 | End: 2023-04-14 | Stop reason: HOSPADM

## 2023-04-14 RX ORDER — CEFAZOLIN SODIUM 2 G/100ML
2 INJECTION, SOLUTION INTRAVENOUS ONCE
Status: COMPLETED | OUTPATIENT
Start: 2023-04-14 | End: 2023-04-14

## 2023-04-14 RX ORDER — ONDANSETRON 4 MG/1
4 TABLET, FILM COATED ORAL ONCE AS NEEDED
Status: DISCONTINUED | OUTPATIENT
Start: 2023-04-14 | End: 2023-04-14 | Stop reason: HOSPADM

## 2023-04-14 RX ORDER — PROPOFOL 10 MG/ML
VIAL (ML) INTRAVENOUS AS NEEDED
Status: DISCONTINUED | OUTPATIENT
Start: 2023-04-14 | End: 2023-04-14 | Stop reason: SURG

## 2023-04-14 RX ORDER — PROMETHAZINE HYDROCHLORIDE 12.5 MG/1
25 TABLET ORAL ONCE AS NEEDED
Status: DISCONTINUED | OUTPATIENT
Start: 2023-04-14 | End: 2023-04-14 | Stop reason: HOSPADM

## 2023-04-14 RX ORDER — HYDROCODONE BITARTRATE AND ACETAMINOPHEN 5; 325 MG/1; MG/1
1-2 TABLET ORAL EVERY 4 HOURS PRN
Qty: 20 TABLET | Refills: 0 | Status: SHIPPED | OUTPATIENT
Start: 2023-04-14

## 2023-04-14 RX ORDER — SODIUM CHLORIDE, SODIUM LACTATE, POTASSIUM CHLORIDE, CALCIUM CHLORIDE 600; 310; 30; 20 MG/100ML; MG/100ML; MG/100ML; MG/100ML
9 INJECTION, SOLUTION INTRAVENOUS CONTINUOUS PRN
Status: DISCONTINUED | OUTPATIENT
Start: 2023-04-14 | End: 2023-04-14 | Stop reason: HOSPADM

## 2023-04-14 RX ORDER — OXYCODONE HYDROCHLORIDE 5 MG/1
5 TABLET ORAL
Status: DISCONTINUED | OUTPATIENT
Start: 2023-04-14 | End: 2023-04-14 | Stop reason: HOSPADM

## 2023-04-14 RX ORDER — GENTAMICIN SULFATE 40 MG/ML
INJECTION, SOLUTION INTRAMUSCULAR; INTRAVENOUS AS NEEDED
Status: DISCONTINUED | OUTPATIENT
Start: 2023-04-14 | End: 2023-04-14 | Stop reason: HOSPADM

## 2023-04-14 RX ORDER — DEXAMETHASONE SODIUM PHOSPHATE 4 MG/ML
INJECTION, SOLUTION INTRA-ARTICULAR; INTRALESIONAL; INTRAMUSCULAR; INTRAVENOUS; SOFT TISSUE AS NEEDED
Status: DISCONTINUED | OUTPATIENT
Start: 2023-04-14 | End: 2023-04-14 | Stop reason: SURG

## 2023-04-14 RX ORDER — MIDAZOLAM HYDROCHLORIDE 2 MG/2ML
2 INJECTION, SOLUTION INTRAMUSCULAR; INTRAVENOUS ONCE
Status: COMPLETED | OUTPATIENT
Start: 2023-04-14 | End: 2023-04-14

## 2023-04-14 RX ORDER — LIDOCAINE HYDROCHLORIDE 20 MG/ML
INJECTION, SOLUTION EPIDURAL; INFILTRATION; INTRACAUDAL; PERINEURAL AS NEEDED
Status: DISCONTINUED | OUTPATIENT
Start: 2023-04-14 | End: 2023-04-14 | Stop reason: SURG

## 2023-04-14 RX ORDER — MEPERIDINE HYDROCHLORIDE 25 MG/ML
12.5 INJECTION INTRAMUSCULAR; INTRAVENOUS; SUBCUTANEOUS
Status: DISCONTINUED | OUTPATIENT
Start: 2023-04-14 | End: 2023-04-14 | Stop reason: HOSPADM

## 2023-04-14 RX ORDER — FENTANYL CITRATE 50 UG/ML
INJECTION, SOLUTION INTRAMUSCULAR; INTRAVENOUS AS NEEDED
Status: DISCONTINUED | OUTPATIENT
Start: 2023-04-14 | End: 2023-04-14 | Stop reason: SURG

## 2023-04-14 RX ORDER — BUPIVACAINE HYDROCHLORIDE 2.5 MG/ML
INJECTION, SOLUTION EPIDURAL; INFILTRATION; INTRACAUDAL AS NEEDED
Status: DISCONTINUED | OUTPATIENT
Start: 2023-04-14 | End: 2023-04-14 | Stop reason: HOSPADM

## 2023-04-14 RX ORDER — KETOROLAC TROMETHAMINE 30 MG/ML
INJECTION, SOLUTION INTRAMUSCULAR; INTRAVENOUS AS NEEDED
Status: DISCONTINUED | OUTPATIENT
Start: 2023-04-14 | End: 2023-04-14 | Stop reason: SURG

## 2023-04-14 RX ORDER — ONDANSETRON 2 MG/ML
4 INJECTION INTRAMUSCULAR; INTRAVENOUS ONCE AS NEEDED
Status: DISCONTINUED | OUTPATIENT
Start: 2023-04-14 | End: 2023-04-14 | Stop reason: HOSPADM

## 2023-04-14 RX ORDER — ACETAMINOPHEN 500 MG
1000 TABLET ORAL ONCE
Status: COMPLETED | OUTPATIENT
Start: 2023-04-14 | End: 2023-04-14

## 2023-04-14 RX ADMIN — FENTANYL CITRATE 100 MCG: 50 INJECTION, SOLUTION INTRAMUSCULAR; INTRAVENOUS at 12:10

## 2023-04-14 RX ADMIN — MIDAZOLAM HYDROCHLORIDE 2 MG: 1 INJECTION, SOLUTION INTRAMUSCULAR; INTRAVENOUS at 12:00

## 2023-04-14 RX ADMIN — PROPOFOL 50 MG: 10 INJECTION, EMULSION INTRAVENOUS at 12:16

## 2023-04-14 RX ADMIN — CEFAZOLIN SODIUM 2 G: 2 INJECTION, SOLUTION INTRAVENOUS at 12:19

## 2023-04-14 RX ADMIN — KETOROLAC TROMETHAMINE 30 MG: 30 INJECTION, SOLUTION INTRAMUSCULAR; INTRAVENOUS at 12:24

## 2023-04-14 RX ADMIN — PROPOFOL 50 MG: 10 INJECTION, EMULSION INTRAVENOUS at 12:13

## 2023-04-14 RX ADMIN — ACETAMINOPHEN 1000 MG: 500 TABLET ORAL at 09:58

## 2023-04-14 RX ADMIN — SODIUM CHLORIDE, POTASSIUM CHLORIDE, SODIUM LACTATE AND CALCIUM CHLORIDE 9 ML/HR: 600; 310; 30; 20 INJECTION, SOLUTION INTRAVENOUS at 09:56

## 2023-04-14 RX ADMIN — PROPOFOL 50 MG: 10 INJECTION, EMULSION INTRAVENOUS at 12:10

## 2023-04-14 RX ADMIN — DEXAMETHASONE SODIUM PHOSPHATE 4 MG: 4 INJECTION, SOLUTION INTRA-ARTICULAR; INTRALESIONAL; INTRAMUSCULAR; INTRAVENOUS; SOFT TISSUE at 12:23

## 2023-04-14 RX ADMIN — PROPOFOL 100 MCG/KG/MIN: 10 INJECTION, EMULSION INTRAVENOUS at 12:10

## 2023-04-14 RX ADMIN — LIDOCAINE HYDROCHLORIDE 100 MG: 20 INJECTION, SOLUTION EPIDURAL; INFILTRATION; INTRACAUDAL; PERINEURAL at 12:10

## 2023-04-14 NOTE — ANESTHESIA PREPROCEDURE EVALUATION
Anesthesia Evaluation     Patient summary reviewed and Nursing notes reviewed   no history of anesthetic complications:  NPO Solid Status: > 8 hours  NPO Liquid Status: > 2 hours           Airway   Mallampati: II  TM distance: >3 FB  Neck ROM: full  No difficulty expected  Dental      Pulmonary - negative pulmonary ROS and normal exam    breath sounds clear to auscultation  Cardiovascular - negative cardio ROS and normal exam  Exercise tolerance: good (4-7 METS)    Rhythm: regular  Rate: normal        Neuro/Psych- negative ROS  GI/Hepatic/Renal/Endo - negative ROS     Musculoskeletal (-) negative ROS    Abdominal    Substance History - negative use     OB/GYN negative ob/gyn ROS         Other   blood dyscrasia,     ROS/Med Hx Other: PAT Nursing Notes unavailable.                   Anesthesia Plan    ASA 3     general       Anesthetic plan, risks, benefits, and alternatives have been provided, discussed and informed consent has been obtained with: patient and other.        CODE STATUS:

## 2023-04-14 NOTE — DISCHARGE INSTRUCTIONS
DISCHARGE INSTRUCTIONS  [] Breast Biopsy  [] Lumpectomy  [] Lymph Node Dissection           For your surgery you had:  General anesthesia (you may have a sore throat for the first 24 hours)  IV sedation  Local anesthesia   You may experience dizziness, drowsiness, or light-headedness for several hours following surgery/procedure.  Do not stay alone today or tonight.  Limit your activity for 24 hours.  You should not drive, operate machinery, drink alcohol, or sign legally binding documents for 24 hours or while you are taking pain medication.  Resume your diet slowly.  Follow whatever special dietary instructions you may have been given by your doctor.  Last dose of pain medication was given at:   .  NOTIFY YOUR DOCTOR IF YOU EXPERIENCE ANY OF THE FOLLOWING:  Temperature greater than 101 degrees Fahrenheit  Shaking Chills  Redness or excessive drainage from incision  Nausea, vomiting and/or pain that is not controlled by prescribed medications  Increase in bleeding or bleeding that is excessive  Unable to urinate in 6 hours after surgery  If unable to reach your doctor, please go to the closest Emergency Room    You may remove your dressing on  Sunday .  You may shower Sunday  Wear a bra for support 24/7  Do not do any heavy lifting.  After your surgery you may notice some bruising.  This is normal.  Medications per physician instructions as indicated on Discharge Medication Information Sheet.  You should see   for follow-up care   on   .  Phone number:       SPECIAL INSTRUCTIONS:                        
no

## 2023-04-14 NOTE — ANESTHESIA POSTPROCEDURE EVALUATION
Patient: Sailaja Mercer    Procedure Summary     Date: 04/14/23 Room / Location: [facility] OR  / [facility]    Anesthesia Start: 1209 Anesthesia Stop: 1259    Procedure: Excision of left breast mass (Left: Breast) Diagnosis:       Mass of left breast, unspecified quadrant      (Mass of left breast, unspecified quadrant [N63.20])    Surgeons: Erma Quiros MD Provider: Mtahew Viramontes MD    Anesthesia Type: general ASA Status: 3          Anesthesia Type: general    Vitals  Vitals Value Taken Time   /71 04/14/23 1308   Temp 36.8 °C (98.2 °F) 04/14/23 1256   Pulse 102 04/14/23 1313   Resp 16 04/14/23 1256   SpO2 99 % 04/14/23 1313   Vitals shown include unvalidated device data.        Post Anesthesia Care and Evaluation    Patient location during evaluation: bedside  Patient participation: complete - patient participated  Level of consciousness: awake  Pain management: adequate    Airway patency: patent  PONV Status: none  Cardiovascular status: acceptable and stable  Respiratory status: acceptable  Hydration status: acceptable    Comments: An Anesthesiologist personally participated in the most demanding procedures (including induction and emergence if applicable) in the anesthesia plan, monitored the course of anesthesia administration at frequent intervals and remained physically present and available for immediate diagnosis and treatment of emergencies.

## 2023-04-14 NOTE — OP NOTE
BREAST LUMPECTOMY  Procedure Report    Patient Name:  Sailaja Mercer  YOB: 1985    Date of Surgery:  4/14/2023     Indications:  Breast mass    Pre-op Diagnosis:   Mass of left breast, unspecified quadrant [N63.20]       Post-Op Diagnosis Codes:     * Mass of left breast, unspecified quadrant [N63.20]    Procedure/CPT® Codes:      Procedure(s):  Excision of left breast mass    Staff:  Surgeon(s):  Erma Quiros MD    Assistant: Trevor Mcnair    Anesthesia: General    Estimated Blood Loss: minimal    Implants:    Implant Name Type Inv. Item Serial No.  Lot No. LRB No. Used Action   CLIPAPPLR M/ ENDO LIGACLIP 20CLP 11IN MD - [id number] Implant CLIPAPPLR M/ ENDO LIGACLIP 20CLP 11IN MD  [facility]  DIV [employer] J [employer] J [id number] Left 1 Implanted   SEAL HEMO SURG FANI/AH ABS/PWDR 3GM - [id number] Implant SEAL HEMO SURG FANI/AH ABS/PWDR 3GM  [employer] HEMO[employer] DFMSim [id number] Left 1 Implanted       Specimen:          Specimens     ID Source Type Tests Collected By Collected At Frozen?    A Breast, Left Tissue · TISSUE PATHOLOGY EXAM   Erma Quiros MD 4/14/23 1225 No    Description: LEFT BREAST MASS- SHORT STITCH SUPERIOR, LONG STITCH LATERAL    Comment: LEFT BREAST MASS- SHORT STITCH SUPERIOR, LONG STITCH LATERAL. FRESH FOR PERMANENT    B Breast, Left Tissue · TISSUE PATHOLOGY EXAM   Erma Quiros MD 4/14/23 1234 No    Description: LEFT BREAST MASS- EXTRA  LATERAL MARGIN, CLIPS ON NEW MARGIN    Comment: LEFT BREAST MASS- EXTRA LATERAL MARGIN, CLIPS ON NEW MARGIN, FRESH FOR PERMANENT    C Breast, Left Tissue · TISSUE PATHOLOGY EXAM   Erma Quiros MD 4/14/23 1236 No    Description: LEFT BREAST MASS- EXTRA MEDIAL MARGIN, CLIPS ON NEW MARGIN    Comment: LEFT BREAST MASS- EXTRA MEDIAL MARGIN, CLIPS ON NEW MARGIN, FRESH FOR PERMANENT              Findings: None    Complications: None    Description of Procedure:   The risks and benefits and  treatment options were discussed with her. After informed consent was obtained, she was taken to the OR and placed supine on the table. An incision was made directly over the palpable mass and then skin flaps were raised anteriorly and the tissue was excised circumferentially around the mass. The specimen was removed and marked for orientation.  It was about 2.5 cm in size and additional margins were sent.  The wound bed was irrigated. Hemostasis was achieved with electrocautery and clips. The deeper layers were closed with 2-0 Vicryl, the skin was closed with a 4.0 subcuticular stitch. The patietn tolerated the procedure well and will followup in 2 weeks  Assistant: Trevor Mcnair  was responsible for performing the following activities: Retraction, Suction and Irrigation and their skilled assistance was necessary for the success of this case.    This procedure was not performed to treat breast cancer through sentinel node biopsy      Erma Quiros MD     Date: 4/14/2023  Time: 13:20 EDT

## 2023-04-17 ENCOUNTER — TELEPHONE (OUTPATIENT)
Dept: ONCOLOGY | Facility: HOSPITAL | Age: 38
End: 2023-04-17

## 2023-04-17 LAB
CYTO UR: NORMAL
LAB AP CASE REPORT: NORMAL
LAB AP CLINICAL INFORMATION: NORMAL
PATH REPORT.FINAL DX SPEC: NORMAL
PATH REPORT.GROSS SPEC: NORMAL

## 2023-04-17 NOTE — TELEPHONE ENCOUNTER
Caller: Sailaja eMrcer    Relationship: Self    Best call back number: [phone]    What is the best time to reach you: ANYTIME     Who are you requesting to speak with (clinical staff, provider,  specific staff member): NON-CLINICAL    What was the call regarding: PT INSURANCE STATED THE LABS HAD NOT BEEN PRE-AUTH THIS TIME. PT NEEDS US TO SEND OVER INFORMATION TO THE INSURANCE COMPANY FOR THAT. LAB WAS 01/02/23. PLEASE CALL INSURANCE.    [facility] #  [phone]    Do you require a callback: YES

## 2023-04-18 ENCOUNTER — PATIENT OUTREACH (OUTPATIENT)
Dept: ONCOLOGY | Facility: HOSPITAL | Age: 38
End: 2023-04-18
Payer: COMMERCIAL

## 2023-04-18 NOTE — PROGRESS NOTES
"Chief Complaint  Post-op Follow-up    Subjective          History of Present Illness  The patient is here to follow up on excision of breast mass.  They are doing well and have no complaints.  Pathology is shown below:    Results for orders placed or performed during the hospital encounter of 04/14/23   Pregnancy, Urine - Urine, Clean Catch    Specimen: Urine, Clean Catch   Result Value Ref Range    HCG, Urine QL Negative Negative   Tissue Pathology Exam    Specimen: A: Breast, Left; Tissue    B: Breast, Left; Tissue    C: Breast, Left; Tissue   Result Value Ref Range    Case Report       Surgical Pathology Report                         Case: [id number]                                  Authorizing Provider:  Erma Quiros MD    Collected:           04/14/2023 12:25 PM          Ordering Location:     [facility]  Received:            04/14/2023 12:57 PM                                 OR                                                                           Pathologist:           Nina Pope DO                                                       Specimens:   1) - Breast, Left, LEFT BREAST MASS- SHORT STITCH SUPERIOR, LONG STITCH LATERAL                     2) - Breast, Left, LEFT BREAST MASS- EXTRA  LATERAL MARGIN, CLIPS ON NEW MARGIN                     3) - Breast, Left, LEFT BREAST MASS- EXTRA MEDIAL MARGIN, CLIPS ON NEW MARGIN              Clinical Information       Mass of left breast, unspecified quadrant      Final Diagnosis       1. Left breast, mass, excision:   - Fibrosis  - Negative for atypia and malignancy       2. Left breast, mass, extra lateral margin, reexcision:   - Fibrosis  - Negative for atypia and malignancy       3. Left breast, mass, extra medial margin, reexcision:   - Fibrosis  - Negative for atypia and malignancy       Gross Description       1. Breast, Left.  The specimen is received fresh and subsequently placed in formalin, labeled \" left breast " "mass-short stitch superior, long stitch lateral\" and consists of a 20 g, 4.5 x 3.7 x 2.5 cm (M/L x S/I x A/P) left lumpectomy specimen received with 2 stitches.  No skin is present.  The specimen is differentially inked (see ink key) and serially sectioned from medial to lateral to reveal a 4.5 x 3.7 x 2.5 cm tan, fibrous lesion, abutting each margin.  No biopsy clips or cavities are identified.  Representative sections are submitted in 5 cassettes, to include sections of the medial and lateral margins in 1A.  Ink key: anterior-green, inferior-blue, lateral-orange, medial-yellow, posterior-black, superior-red.  Cold ischemic time-25 minutes;    2. Breast, Left.  The specimen is received fresh and subsequently placed in formalin, labeled \"left breast mass-extralateral margin, clips on new margin\" and consists of a 3 g, 3.0 x 2.5 x 1.0 cm fragment of left breast parenchyma received with clips on 1 aspect designating the new lateral margin (inked blue).  No skin is present.  Sectioning reveals tan-pink fibrous tissue and scant adipose tissue.  The specimen is entirely submitted in 3 cassettes.    3. Breast, Left.  The specimen is received fresh and subsequently placed in formalin, labeled \"left breast mass-extra medial margin, clips on new margin\" and consists of a 4 g, 4.0 x 2.0 x 1.5 cm fragment of left breast parenchyma received with clips on 1 aspect designating the new medial margin (inked blue).  No skin is present.  Sectioning reveals tan fibrous tissue and scant adipose tissue.  The specimen is entirely submitted in 5 cassettes.   BHANU      Microscopic Description          Objective   Vital Signs:   Resp 14   Ht 152.4 cm (60\")   Wt 54.4 kg (120 lb)   BMI 23.44 kg/m²     Physical Exam  Vitals and nursing note reviewed.   Constitutional:       General: She is not in acute distress.     Appearance: Normal appearance. She is well-developed.   HENT:      Head: Normocephalic and atraumatic.   Eyes:      Extraocular " Movements: Extraocular movements intact.      Pupils: Pupils are equal, round, and reactive to light.   Cardiovascular:      Pulses: Normal pulses.   Pulmonary:      Effort: Pulmonary effort is normal. No retractions.      Breath sounds: Normal air entry. No wheezing.   Abdominal:      General: There is no distension.      Palpations: Abdomen is soft.      Tenderness: There is no abdominal tenderness.      Hernia: No hernia is present.   Musculoskeletal:         General: No swelling or deformity.      Cervical back: Neck supple.   Skin:     General: Skin is warm and dry.      Findings: No erythema.      Comments: Surgical Incision Healing Well   Neurological:      General: No focal deficit present.      Mental Status: She is alert and oriented to person, place, and time.      Motor: Motor function is intact.   Psychiatric:         Mood and Affect: Mood normal.         Thought Content: Thought content normal.            Result Review :                 Assessment and Plan    Diagnoses and all orders for this visit:    1. Mass of left breast, unspecified quadrant (Primary)    Followup prn  Return to routine screening    Follow Up   Return if symptoms worsen or fail to improve.  Patient was given instructions and counseling regarding her condition or for health maintenance advice. Please see specific information pulled into the AVS if appropriate.     Answers for HPI/ROS submitted by the patient on 4/14/2023  Please describe your symptoms.: Follow up  Have you had these symptoms before?: No  How long have you been having these symptoms?: Greater than 2 weeks  What is the primary reason for your visit?: Other

## 2023-04-19 RX ORDER — AMOXICILLIN 500 MG/1
1 CAPSULE ORAL 3 TIMES DAILY
COMMUNITY
Start: 2023-04-16

## 2023-04-20 ENCOUNTER — OFFICE VISIT (OUTPATIENT)
Dept: SURGERY | Facility: CLINIC | Age: 38
End: 2023-04-20
Payer: COMMERCIAL

## 2023-04-20 VITALS — WEIGHT: 120 LBS | BODY MASS INDEX: 23.56 KG/M2 | HEIGHT: 60 IN | RESPIRATION RATE: 14 BRPM

## 2023-04-20 DIAGNOSIS — N63.20 MASS OF LEFT BREAST, UNSPECIFIED QUADRANT: Primary | ICD-10-CM

## 2023-04-20 PROCEDURE — 99024 POSTOP FOLLOW-UP VISIT: CPT | Performed by: SURGERY

## 2023-04-24 ENCOUNTER — TELEPHONE (OUTPATIENT)
Dept: SURGERY | Facility: CLINIC | Age: 38
End: 2023-04-24
Payer: COMMERCIAL

## 2023-04-24 NOTE — TELEPHONE ENCOUNTER
PATIENT HAD LUMPECTOMY ON 04/17/23.  SHE SAID THAT IN THE PAST HOUR, HER SURGICAL SITE HAS GOTTEN HARD AND SWOLLEN.  SHE SAID IT IS SORE TO TOUCH AND A LITTLE RED.  THE STERI-STRIPS ARE STILL IN PLACE.    IS THIS NORMAL?

## 2023-04-24 NOTE — TELEPHONE ENCOUNTER
Per Dr. Quiros, this can be normal.  Prescribe Bactrim BID for 7 days.  Patient may see April tomorrow, and if she cannot see her, she can see her Wednesday.      I called the patient and scheduled an appointment for her to see April 04/25/23 at 8:45.  She said she is on Amoxicillin for a sinus infection.  She will talk to April regarding an antibiotic tomorrow.

## 2023-04-25 ENCOUNTER — OFFICE VISIT (OUTPATIENT)
Dept: SURGERY | Facility: CLINIC | Age: 38
End: 2023-04-25
Payer: COMMERCIAL

## 2023-04-25 VITALS — WEIGHT: 120 LBS | HEART RATE: 94 BPM | BODY MASS INDEX: 23.56 KG/M2 | HEIGHT: 60 IN

## 2023-04-25 DIAGNOSIS — Z98.890 S/P LUMPECTOMY, LEFT BREAST: Primary | ICD-10-CM

## 2023-04-25 PROCEDURE — 99024 POSTOP FOLLOW-UP VISIT: CPT | Performed by: NURSE PRACTITIONER

## 2023-04-25 NOTE — PROGRESS NOTES
Chief Complaint: Wound Check    Subjective      Postop concern       History of Present Illness  Sailaja Mercer is a 38 y.o. female presents to [facility]ERY for postop concern.    Patient presents today with complaints of redness around her surgical incision.  Patient underwent a left breast lumpectomy on 4/14/2023 performed by Dr. Erma Quiros.    Patient reports that she noticed last night that around her surgical incision is some mild erythema.  She denies any drainage from the surgical incision.  Denies any fever or chills.    Pathology:  Clinical Information    Mass of left breast, unspecified quadrant   Final Diagnosis   1. Left breast, mass, excision:               - Fibrosis  - Negative for atypia and malignancy         2. Left breast, mass, extra lateral margin, reexcision:               - Fibrosis  - Negative for atypia and malignancy         3. Left breast, mass, extra medial margin, reexcision:               - Fibrosis  - Negative for atypia and malignancy    Electronically signed by Nina Pope DO on 4/17/2023      Thankfully pathology was negative.    Objective     Past Medical History:   Diagnosis Date   • Abnormal Pap smear of cervix    • Anxiety    • Breast mass Dec2022   • Cancer     CML, CHRONIC LEUKEMIA ORAL MED, NO PROBLEMS   • CML (chronic myelocytic leukemia)     2+ years ago   • Seasonal allergies        Past Surgical History:   Procedure Laterality Date   • APPENDECTOMY  06/30/2012   • BREAST BIOPSY Bilateral    • BREAST LUMPECTOMY Left 04/14/2023    Procedure: Excision of left breast mass;  Surgeon: Erma Quiros MD;  Location: [facility] OR [facility] [place];  Service: General;  Laterality: Left;   • COLONOSCOPY  2015   • ENDOSCOPY  2015       Outpatient Medications Marked as Taking for the 4/25/23 encounter (Office Visit) with Светлана Givens APRN   Medication Sig Dispense Refill   • amoxicillin (AMOXIL) 500 MG capsule Take 1 capsule by mouth 3 (Three) Times  "a Day.     • cetirizine (zyrTEC) 10 MG tablet Take 1 tablet by mouth Daily.     • imatinib (Gleevec) 400 MG chemo tablet Take 1 tablet by mouth once daily (Patient taking differently: Take 1 tablet by mouth Every Night.) 30 tablet 11   • Norethin-Eth Estrad-Fe Biphas (Lo Loestrin Fe) 1 MG-10 MCG / 10 MCG tablet Take 1 tablet by mouth Daily. (Patient taking differently: Take 1 tablet by mouth Every Night.) 28 tablet 1   • ondansetron (ZOFRAN) 4 MG tablet Take 2 tablets by mouth Every 8 (Eight) Hours As Needed for Nausea or Vomiting. 30 tablet 3   • saccharomyces boulardii (FLORASTOR) 250 MG capsule Take 1 capsule by mouth 2 (Two) Times a Day. LAST DOSE 4/9/23     • tretinoin (RETIN-A) 0.025 % cream APPLY PEA SIZED AMOUNT TOPICALLY TO FACE EVERY NIGHT AT BEDTIME AFTER WASHING THE FACE         Allergies   Allergen Reactions   • Nickel Rash        Family History   Problem Relation Age of Onset   • Breast cancer Mother 42   • Cancer Mother    • Breast cancer Maternal Grandmother 50   • Ovarian cancer Maternal Grandmother    • Lung cancer Paternal Grandmother    • Uterine cancer Neg Hx    • Prostate cancer Neg Hx    • Colon cancer Neg Hx    • Malig Hyperthermia Neg Hx        Social History     Socioeconomic History   • Marital status:    Tobacco Use   • Smoking status: Never   • Smokeless tobacco: Never   Vaping Use   • Vaping Use: Never used   Substance and Sexual Activity   • Alcohol use: Not Currently     Comment: Current some day   • Drug use: Never   • Sexual activity: Yes     Partners: Male     Birth control/protection: Pill, OCP       Review of Systems   Constitutional: Negative for chills and fever.   Genitourinary: Positive for breast pain. Negative for breast discharge and breast lump.        Vital Signs:   Pulse 94   Ht 152.4 cm (60\")   Wt 54.4 kg (120 lb)   BMI 23.44 kg/m²      Physical Exam  Vitals and nursing note reviewed.   Constitutional:       General: She is not in acute distress.     " Appearance: Normal appearance.   HENT:      Head: Normocephalic.   Cardiovascular:      Rate and Rhythm: Normal rate.   Pulmonary:      Effort: Pulmonary effort is normal.      Breath sounds: No stridor.   Abdominal:      Palpations: Abdomen is soft.      Tenderness: There is no guarding.   Skin:     General: Skin is warm and dry.      Comments: Left breast: Surgical incision is clean dry and intact with Steri-Strips present.  Mild erythema noted on the lateral side of the incision.  Possibly a seroma   Neurological:      General: No focal deficit present.      Mental Status: She is alert and oriented to person, place, and time.   Psychiatric:         Mood and Affect: Mood normal.         Thought Content: Thought content normal.          Result Review :          []  Laboratory  []  Radiology  []  Pathology  []  Microbiology  []  EKG/Telemetry   []  Cardiology/Vascular   []  Old records  Today I reviewed Dr. Quiros's operative and pathology report     Assessment and Plan    Diagnoses and all orders for this visit:    1. S/P lumpectomy, left breast (Primary)        Follow Up   Return for Follow-up with me in 1 week.     Patient is with very mild erythema around surgical incision.  Today we choose to watch this incision, schedule follow-up with me next week for reevaluation.    Seek medical emergency services:  Fever greater than 101 associated with chills.  Extreme pain.    Patient was given instructions and counseling regarding her condition or for health maintenance advice. Please see specific information pulled into the AVS if appropriate.

## 2023-05-02 ENCOUNTER — OFFICE VISIT (OUTPATIENT)
Dept: SURGERY | Facility: CLINIC | Age: 38
End: 2023-05-02
Payer: COMMERCIAL

## 2023-05-02 VITALS — BODY MASS INDEX: 23.71 KG/M2 | OXYGEN SATURATION: 99 % | HEIGHT: 60 IN | WEIGHT: 120.8 LBS | HEART RATE: 92 BPM

## 2023-05-02 DIAGNOSIS — N64.89 SEROMA OF BREAST: ICD-10-CM

## 2023-05-02 DIAGNOSIS — Z98.890 S/P LUMPECTOMY, LEFT BREAST: Primary | ICD-10-CM

## 2023-05-02 NOTE — PROGRESS NOTES
Chief Complaint: Post-op Follow-up    Subjective      Postop concern       History of Present Illness  Sailaja Mercer is a 38 y.o. female presents to [facility] GENER[facility] SURGERY for postop concern.    Patient presents today with complaints of redness around her surgical incision.  Patient underwent a left breast lumpectomy on 4/14/2023 performed by Dr. Erma Quiros.    Patient reports that she noticed last night that around her surgical incision is some mild erythema.  She denies any drainage from the surgical incision.  Denies any fever or chills.    Pathology:  Clinical Information    Mass of left breast, unspecified quadrant   Final Diagnosis   1. Left breast, mass, excision:               - Fibrosis  - Negative for atypia and malignancy         2. Left breast, mass, extra lateral margin, reexcision:               - Fibrosis  - Negative for atypia and malignancy         3. Left breast, mass, extra medial margin, reexcision:               - Fibrosis  - Negative for atypia and malignancy    Electronically signed by Nina Pope DO on 4/17/2023      Thankfully pathology was negative.    Objective     Past Medical History:   Diagnosis Date   • Abnormal Pap smear of cervix    • Anxiety    • Breast mass Dec2022   • Cancer     CML, CHRONIC LEUKEMIA ORAL MED, NO PROBLEMS   • CML (chronic myelocytic leukemia)     2+ years ago   • Seasonal allergies        Past Surgical History:   Procedure Laterality Date   • APPENDECTOMY  06/30/2012   • BREAST BIOPSY Bilateral    • BREAST LUMPECTOMY Left 04/14/2023    Procedure: Excision of left breast mass;  Surgeon: Erma Quiros MD;  [place] [facility] [place] [facility] [place]  Service: General;  Laterality: Left;   • COLONOSCOPY  2015   • ENDOSCOPY  2015       Outpatient Medications Marked as Taking for the 5/2/23 encounter (Office Visit) with Светлана Givens APRN   Medication Sig Dispense Refill   • cetirizine (zyrTEC) 10 MG tablet Take 1 tablet by mouth Daily.     •  "imatinib (Gleevec) 400 MG chemo tablet Take 1 tablet by mouth once daily (Patient taking differently: Take 1 tablet by mouth Every Night.) 30 tablet 11   • Norethin-Eth Estrad-Fe Biphas (Lo Loestrin Fe) 1 MG-10 MCG / 10 MCG tablet Take 1 tablet by mouth Daily. (Patient taking differently: Take 1 tablet by mouth Every Night.) 28 tablet 1   • ondansetron (ZOFRAN) 4 MG tablet Take 2 tablets by mouth Every 8 (Eight) Hours As Needed for Nausea or Vomiting. 30 tablet 3   • saccharomyces boulardii (FLORASTOR) 250 MG capsule Take 1 capsule by mouth 2 (Two) Times a Day. LAST DOSE 4/9/23     • tretinoin (RETIN-A) 0.025 % cream APPLY PEA SIZED AMOUNT TOPICALLY TO FACE EVERY NIGHT AT BEDTIME AFTER WASHING THE FACE         Allergies   Allergen Reactions   • Nickel Rash        Family History   Problem Relation Age of Onset   • Breast cancer Mother 42   • Cancer Mother    • Breast cancer Maternal Grandmother 50   • Ovarian cancer Maternal Grandmother    • Lung cancer Paternal Grandmother    • Uterine cancer Neg Hx    • Prostate cancer Neg Hx    • Colon cancer Neg Hx    • Malig Hyperthermia Neg Hx        Social History     Socioeconomic History   • Marital status:    Tobacco Use   • Smoking status: Never   • Smokeless tobacco: Never   Vaping Use   • Vaping Use: Never used   Substance and Sexual Activity   • Alcohol use: Not Currently     Comment: Current some day   • Drug use: Never   • Sexual activity: Yes     Partners: Male     Birth control/protection: Pill, OCP       Review of Systems   Constitutional: Negative for chills and fever.   Genitourinary: Positive for breast pain. Negative for breast discharge and breast lump.        Vital Signs:   Pulse 92   Ht 152.4 cm (60\")   Wt 54.8 kg (120 lb 12.8 oz)   SpO2 99%   BMI 23.59 kg/m²      Physical Exam  Vitals and nursing note reviewed.   Constitutional:       General: She is not in acute distress.     Appearance: Normal appearance.   HENT:      Head: Normocephalic. "   Cardiovascular:      Rate and Rhythm: Normal rate.   Pulmonary:      Effort: Pulmonary effort is normal.      Breath sounds: No stridor.   Abdominal:      Palpations: Abdomen is soft.      Tenderness: There is no guarding.   Skin:     General: Skin is warm and dry.      Comments: Left breast: Surgical incision is clean dry and intact with Steri-Strips present.  Erythema has improved.  Patient still reports some discomfort.   Neurological:      General: No focal deficit present.      Mental Status: She is alert and oriented to person, place, and time.   Psychiatric:         Mood and Affect: Mood normal.         Thought Content: Thought content normal.          Result Review :   []  Laboratory  []  Radiology  []  Pathology  []  Microbiology  []  EKG/Telemetry   []  Cardiology/Vascular   []  Old records  Today I reviewed Dr. Quiros's operative and pathology report     Assessment and Plan    Diagnoses and all orders for this visit:    1. S/P lumpectomy, left breast (Primary)    2. Seroma of breast        Follow Up   Return if symptoms worsen or fail to improve.     Seek medical emergency services:  Fever greater than 101 associated with chills.  Extreme pain.    Patient was given instructions and counseling regarding her condition or for health maintenance advice. Please see specific information pulled into the AVS if appropriate.

## 2023-05-31 NOTE — PROGRESS NOTES
"  HPI:   38 y.o. . Presents for well woman exam. Contraception:  Birth control pill for menstrual regulation  Menses:   q 28-90 days, lasts 2 days, changes regular tampon q 4hrs on heaviest days.   Pain:  None  Last pap: normal   Complaints: Pt has no complaints today.  Mammo Diagnostic Digital Tomosynthesis Left With CAD (2023 12:13)   IGP,rfx Aptima HPV All Pth (2022 14:49)   SCANNED - LABS (2020)       Past Medical History:   Diagnosis Date   • Abnormal Pap smear of cervix    • Anxiety    • Breast mass    • CML (chronic myelocytic leukemia)     2+ years ago   • Seasonal allergies       Past Surgical History:   Procedure Laterality Date   • APPENDECTOMY  2012   • BREAST BIOPSY Bilateral    • BREAST LUMPECTOMY Left 2023    Procedure: Excision of left breast mass;  Surgeon: Erma Quiros MD;  [place] [facility] [place] [facility] [place];  Service: General;  Laterality: Left;   • COLONOSCOPY     • ENDOSCOPY        Family History   Problem Relation Age of Onset   • Breast cancer Mother 42   • Cancer Mother    • Breast cancer Maternal Grandmother 50   • Ovarian cancer Maternal Grandmother    • Lung cancer Paternal Grandmother    • Uterine cancer Neg Hx    • Prostate cancer Neg Hx    • Colon cancer Neg Hx    • Malig Hyperthermia Neg Hx      Allergies as of 2023 - Reviewed 2023   Allergen Reaction Noted   • Nickel Rash 04/10/2023        PCP: does manage PMHx and preventative labs    /84   Pulse 91   Ht 152.4 cm (60\")   Wt 54.5 kg (120 lb 3.2 oz)   LMP  (LMP Unknown) Comment: pt states period every 3 months with OCP  BMI 23.47 kg/m²     PHYSICAL EXAM: Chaperone present   General- NAD, alert and oriented, appropriate  Psych- Normal mood, good memory  Neck- No masses, no thyroid enlargement  Lymphatic- No palpable neck, axillary, or groin nodes  CV- Regular rhythm, no murmurs  Resp- CTA to bases, no wheezes  Abdomen- Soft, non distended, non tender, no " masses  Breast left-  Bilaterally symmetrical, fullness 2 o'clock position localized below surgical scar, recent biopsy- benign, followed by Dr. Quiros, mildly tender in this area with palpation, no nipple discharge, no bruising, no erythema, scar well approximated and healed  Breast right- Bilaterally symmetrical, no masses, non tender, no nipple discharge  External genitalia- Normal female, no lesions  Urethra/meatus- Normal, no masses, non tender, no prolapse  Bladder- Normal, no masses, non tender, no prolapse  Vagina- Normal, no atrophy, no lesions, no discharge, no prolapse  Cvx- Normal, no lesions, no discharge, No cervical motion tenderness  Uterus- Normal size, shape & consistency.  Non tender, mobile, & no prolapse  Adnexa- No mass, non tender  Anus/Rectum/Perineum- Not performed  Ext- No edema, no cyanosis    Skin- No lesions, no rashes, no acanthosis nigricans      ASSESSMENT and PLAN:    Diagnoses and all orders for this visit:    1. Well woman exam (Primary)    2. Encounter for surveillance of contraceptive pills    3. Encounter for screening mammogram for malignant neoplasm of breast  -     MRI Breast Bilateral With & Without Contrast; Future    4. At high risk for breast cancer  -     MRI Breast Bilateral With & Without Contrast; Future    5. Birth control counseling  -     Norethin-Eth Estrad-Fe Biphas (Lo Loestrin Fe) 1 MG-10 MCG / 10 MCG tablet; Take 1 tablet by mouth Daily.  Dispense: 84 tablet; Refill: 3        Preventative:   BREAST HEALTH- Breast awareness, self breast exam, MMG and/or MRI reviewed per society guidelines and her individual history. Screen: Updated today  CERVICAL CANCER Screening- Reviewed current ASCCP guidelines for screening w and wo cotest HPV, age specific.  Screen: Not medically needed  COLON CANCER Screening- Reviewed current medical society guidelines and options.  Screen:  Not medically needed  VACCINATIONS Recommended: up to date.  Importance discussed, risk  being unvaccinated reviewed.  Questions answered  Smoking status- NON SMOKER  Follow up PCP/Specialist PMHx and Labs  Genetic testing: Previously screened increased risk  TRACK MENSES, RTO if <q21d, >7d long, heavy or painful.    All BIRTH CONTROL options R/B/A/SE/E of each reviewed in detail.  OCP/hormone use risk THROMBOEMBOLIC RISK reviewed.     Fu with Breast surgeon for persistent or worsening post operative breast changes   She understands the importance of having any ordered tests to be performed in a timely fashion.  The risks of not performing them include, but are not limited to, advanced cancer stages, bone loss from osteoporosis and/or subsequent increase in morbidity and/or mortality.  She is encouraged to review her results online and/or contact or office if she has questions.     Follow Up:  Return in about 1 year (around 6/2/2024).        Alysha Lux, APRN  06/02/2023

## 2023-06-02 ENCOUNTER — OFFICE VISIT (OUTPATIENT)
Dept: OBSTETRICS AND GYNECOLOGY | Facility: CLINIC | Age: 38
End: 2023-06-02

## 2023-06-02 VITALS
BODY MASS INDEX: 23.6 KG/M2 | HEART RATE: 91 BPM | WEIGHT: 120.2 LBS | HEIGHT: 60 IN | SYSTOLIC BLOOD PRESSURE: 120 MMHG | DIASTOLIC BLOOD PRESSURE: 84 MMHG

## 2023-06-02 DIAGNOSIS — Z12.31 ENCOUNTER FOR SCREENING MAMMOGRAM FOR MALIGNANT NEOPLASM OF BREAST: ICD-10-CM

## 2023-06-02 DIAGNOSIS — Z91.89 AT HIGH RISK FOR BREAST CANCER: ICD-10-CM

## 2023-06-02 DIAGNOSIS — Z30.41 ENCOUNTER FOR SURVEILLANCE OF CONTRACEPTIVE PILLS: ICD-10-CM

## 2023-06-02 DIAGNOSIS — Z01.419 WELL WOMAN EXAM: Primary | ICD-10-CM

## 2023-06-02 DIAGNOSIS — Z30.09 BIRTH CONTROL COUNSELING: ICD-10-CM

## 2023-06-02 RX ORDER — NORETHINDRONE ACETATE AND ETHINYL ESTRADIOL, ETHINYL ESTRADIOL AND FERROUS FUMARATE 1MG-10(24)
1 KIT ORAL DAILY
Qty: 28 TABLET | Refills: 1 | OUTPATIENT
Start: 2023-06-02

## 2023-06-02 RX ORDER — NORETHINDRONE ACETATE AND ETHINYL ESTRADIOL, ETHINYL ESTRADIOL AND FERROUS FUMARATE 1MG-10(24)
1 KIT ORAL DAILY
Qty: 84 TABLET | Refills: 3 | Status: SHIPPED | OUTPATIENT
Start: 2023-06-02

## 2023-06-02 NOTE — TELEPHONE ENCOUNTER
I received a refill request from the patient's pharmacy for Lo Loestrin.  However, the patient had an appointment today and Alysha Lux has renewed this Rx for a full year.  I have denied this refill request due to it being a duplicate.

## 2023-06-08 ENCOUNTER — PATIENT ROUNDING (BHMG ONLY) (OUTPATIENT)
Dept: NEUROLOGY | Facility: CLINIC | Age: 38
End: 2023-06-08
Payer: COMMERCIAL

## 2023-06-08 ENCOUNTER — OFFICE VISIT (OUTPATIENT)
Dept: NEUROLOGY | Facility: CLINIC | Age: 38
End: 2023-06-08
Payer: COMMERCIAL

## 2023-06-08 VITALS
DIASTOLIC BLOOD PRESSURE: 70 MMHG | HEART RATE: 96 BPM | BODY MASS INDEX: 23.34 KG/M2 | WEIGHT: 118.9 LBS | SYSTOLIC BLOOD PRESSURE: 109 MMHG | HEIGHT: 60 IN

## 2023-06-08 DIAGNOSIS — G56.03 BILATERAL CARPAL TUNNEL SYNDROME: Primary | ICD-10-CM

## 2023-06-08 DIAGNOSIS — R90.82 WHITE MATTER ABNORMALITY ON MRI OF BRAIN: ICD-10-CM

## 2023-06-08 NOTE — ASSESSMENT & PLAN NOTE
I discussed with her that the white matter changes noted on MRI are nonspecific she does not have a clinical diagnosis or MRI diagnosis of multiple sclerosis.  I reviewed the neuroimaging studies with her.

## 2023-06-08 NOTE — ASSESSMENT & PLAN NOTE
Nerve conduction studies abnormal shows electrophysiologic evidence for mild carpal tunnel syndrome.  There is conduction block in the right wrist suggesting that it is worse than it is currently.  I discussed with her to wear wrist splints notably the left side since that is her most symptomatic.  She is to follow-up with orthopedic surgery if there is no improvement of her symptoms I would recommend for her to have steroid injections to see if it helps her symptoms.  I will see her again in 2 months time for follow-up for reevaluation.  Further testing may be performed at that time if there is improvement in her symptoms.

## 2023-06-08 NOTE — PROGRESS NOTES
"Chief Complaint  Numbness (Here to establish care for bilateral upper extremity numbness and white matter changes on MRI)    Subjective          Sailaja Mercer is a 38 y.o. female who presents to [facility]  History of Present Illness  38-year-old woman here for relation of 2 different problems.  She has had numbness in her hands since December 2022.  It is worse at night.  It wakes her up several times at night and she has gotten used to it at this time.  States that at night it goes up to her forearm.  Urine at a time he does see her happens in her hand.  It happens during the daytime when she is doing activities such as holding her phone or a book.  She has to shake her hand.  This is last for about 30 seconds.  She wakes up multiple times at night but she is gotten used to it.  She is finally to sleep better.  She tried reducing wrist pins twice and it made things worse.  She only used it in the left hand.  She had a nerve conduction study performed at another neurologist and she was told that she had moderate carpal tunnel syndrome on the right and mild on the left.    She had MRI of brain showing very few nonspecific white matter hyperintensities.  She is never had any focal neurologic deficits in the past such as focal weakness, blindness, double vision, numbness other than her hands, gait abnormalities, bowel or bladder symptoms.      Objective   Vital Signs:   /70   Pulse 96   Ht 152.4 cm (60\")   Wt 53.9 kg (118 lb 14.4 oz)   BMI 23.22 kg/m²     Physical Exam   She is alert, fluent, phasic, follows commands well.  There is no weakness of the upper or lower extremities.  Phalen sign is negative.  Pressure at the wrist does not produce tingling.        Assessment and Plan  Diagnoses and all orders for this visit:    1. Bilateral carpal tunnel syndrome (Primary)  Assessment & Plan:  Nerve conduction studies abnormal shows electrophysiologic evidence for mild " carpal tunnel syndrome.  There is conduction block in the right wrist suggesting that it is worse than it is currently.  I discussed with her to wear wrist splints notably the left side since that is her most symptomatic.  She is to follow-up with orthopedic surgery if there is no improvement of her symptoms I would recommend for her to have steroid injections to see if it helps her symptoms.  I will see her again in 2 months time for follow-up for reevaluation.  Further testing may be performed at that time if there is improvement in her symptoms.    Orders:  -     Ambulatory Referral to Orthopedic Surgery    2. White matter abnormality on MRI of brain  Assessment & Plan:  I discussed with her that the white matter changes noted on MRI are nonspecific she does not have a clinical diagnosis or MRI diagnosis of multiple sclerosis.  I reviewed the neuroimaging studies with her.           Nerve Conduction Study:  8 nerves     EMG:  Not performed    Total time spent with the patient and coordinating patient care was 35 minutes.    Follow Up  No follow-ups on file.  Patient was given instructions and counseling regarding her condition or for health maintenance advice. Please see specific information pulled into the AVS if appropriate.

## 2023-08-08 DIAGNOSIS — R11.0 NAUSEA: ICD-10-CM

## 2023-08-08 RX ORDER — ONDANSETRON 4 MG/1
TABLET, FILM COATED ORAL
Qty: 30 TABLET | Refills: 3 | Status: SHIPPED | OUTPATIENT
Start: 2023-08-08

## 2023-09-11 NOTE — PROGRESS NOTES
GYN Problem/Follow Up Visit    Chief Complaint   Patient presents with    AUB         HPI  Sailaja Mercer is a 38 y.o. female, , who presents for 4 weeks irregular bleeding. Taking Lo loestrin for several years, menses prior occurred every 3 months lasting 2-3 days. Most recently experienced light flow to spotting for 30 days, still spotting dark blood today, change regular tampon every 1 hour on heaviest days, Minimal menstrual cramps.     No missed doses ocp, recovering from sinus infection, took antibiotics for 10 days, completed 10 days ago.     Normal thyroid function 2022, no fatigue or weight changes.    Additional OB/GYN History   Patient's last menstrual period was 2023.  Current contraception: contraceptive methods: OCP (estrogen/progesterone)    Past Medical History:   Diagnosis Date    Abnormal Pap smear of cervix     Anxiety     Breast mass     CML (chronic myelocytic leukemia)     2+ years ago    Seasonal allergies       Past Surgical History:   Procedure Laterality Date    APPENDECTOMY  2012    BREAST BIOPSY Bilateral     BREAST LUMPECTOMY Left 2023    Procedure: Excision of left breast mass;  Surgeon: Erma Quiros MD;  Location: [facility] OR [facility] [place] [facility];  Service: General;  Laterality: Left;    COLONOSCOPY      ENDOSCOPY        Family History   Problem Relation Age of Onset    Breast cancer Mother 42    Cancer Mother     Breast cancer Maternal Grandmother 50    Ovarian cancer Maternal Grandmother     Lung cancer Paternal Grandmother     Uterine cancer Neg Hx     Prostate cancer Neg Hx     Colon cancer Neg Hx     Malig Hyperthermia Neg Hx      Allergies as of 2023 - Reviewed 2023   Allergen Reaction Noted    Nickel Rash 04/10/2023      The additional following portions of the patient's history were reviewed and updated as appropriate: allergies, current medications, past family history, past medical history, past social history, past surgical  "history, and problem list.    Review of Systems    See HPI for pertinent ROS    Objective   /83   Pulse 87   Ht 152.4 cm (60\")   Wt 54 kg (119 lb)   LMP 08/17/2023 Comment: Bleeding for 4 weeks  Breastfeeding No   BMI 23.24 kg/m²     Physical Exam  Vitals and nursing note reviewed. Exam conducted with a chaperone present.   Constitutional:       Appearance: Normal appearance.   Cardiovascular:      Rate and Rhythm: Normal rate.   Pulmonary:      Effort: Pulmonary effort is normal.   Genitourinary:     General: Normal vulva.      Vagina: Normal. No vaginal discharge (dark, brown blood in vault).      Cervix: Normal.      Uterus: Normal.       Adnexa: Right adnexa normal and left adnexa normal.   Lymphadenopathy:      Lower Body: No right inguinal adenopathy. No left inguinal adenopathy.   Skin:     General: Skin is warm and dry.   Neurological:      Mental Status: She is alert and oriented to person, place, and time.          Assessment and Plan    HCG, Urine, QL   Date Value Ref Range Status   09/14/2023 Negative Negative Final       Counseling:  TRACK MENSES, RTO if <q21d, >7d long, heavy or painful.    All BIRTH CONTROL options R/B/A/SE/E of each reviewed in detail.  SAFE SEX/condoms importance reviewed.    She is not concerned for STI, counseled regarding effects of antibiotic use/infection in relation to menstrual cycle. Transient changes may commonly occur. Use of condoms when taking antibiotic d/t risk of contraceptive failure. She desires to keep a menstrual diary, bleeding is light at this time. If bleeding continues beyond 2 weeks or if abnormal menstrual bleeding is recurring, she will call the office with plans to schedule for pelvic ultrasound as well as obtain lab, cbc, prolactin, tsh. She will continue the loloestrin at this time. Also discussed use of alternative contraception should the irregular bleeding continue.  Importance of reporting persistent or worsening symptoms.      Follow " Up:  Return if symptoms worsen or fail to improve.        Alysha Lux, APRN  09/14/2023

## 2023-09-14 ENCOUNTER — OFFICE VISIT (OUTPATIENT)
Dept: OBSTETRICS AND GYNECOLOGY | Facility: CLINIC | Age: 38
End: 2023-09-14
Payer: COMMERCIAL

## 2023-09-14 VITALS
BODY MASS INDEX: 23.36 KG/M2 | WEIGHT: 119 LBS | SYSTOLIC BLOOD PRESSURE: 129 MMHG | DIASTOLIC BLOOD PRESSURE: 83 MMHG | HEIGHT: 60 IN | HEART RATE: 87 BPM

## 2023-09-14 DIAGNOSIS — N92.1 MENORRHAGIA WITH IRREGULAR CYCLE: Primary | ICD-10-CM

## 2023-09-14 LAB
B-HCG UR QL: NEGATIVE
EXPIRATION DATE: NORMAL
INTERNAL NEGATIVE CONTROL: NORMAL
INTERNAL POSITIVE CONTROL: NORMAL
Lab: NORMAL

## 2023-09-20 ENCOUNTER — LAB (OUTPATIENT)
Dept: LAB | Facility: HOSPITAL | Age: 38
End: 2023-09-20
Payer: COMMERCIAL

## 2023-09-20 DIAGNOSIS — C92.10 CML (CHRONIC MYELOCYTIC LEUKEMIA): ICD-10-CM

## 2023-09-20 LAB
ALBUMIN SERPL-MCNC: 4.4 G/DL (ref 3.5–5.2)
ALBUMIN/GLOB SERPL: 2 G/DL
ALP SERPL-CCNC: 54 U/L (ref 39–117)
ALT SERPL W P-5'-P-CCNC: 11 U/L (ref 1–33)
ANION GAP SERPL CALCULATED.3IONS-SCNC: 10.6 MMOL/L (ref 5–15)
AST SERPL-CCNC: 13 U/L (ref 1–32)
BASOPHILS # BLD AUTO: 0.02 10*3/MM3 (ref 0–0.2)
BASOPHILS NFR BLD AUTO: 0.6 % (ref 0–1.5)
BILIRUB SERPL-MCNC: 0.4 MG/DL (ref 0–1.2)
BUN SERPL-MCNC: 13 MG/DL (ref 6–20)
BUN/CREAT SERPL: 17.3 (ref 7–25)
CALCIUM SPEC-SCNC: 9.3 MG/DL (ref 8.6–10.5)
CHLORIDE SERPL-SCNC: 106 MMOL/L (ref 98–107)
CO2 SERPL-SCNC: 23.4 MMOL/L (ref 22–29)
CREAT SERPL-MCNC: 0.75 MG/DL (ref 0.57–1)
DEPRECATED RDW RBC AUTO: 39.5 FL (ref 37–54)
EGFRCR SERPLBLD CKD-EPI 2021: 105.3 ML/MIN/1.73
EOSINOPHIL # BLD AUTO: 0.07 10*3/MM3 (ref 0–0.4)
EOSINOPHIL NFR BLD AUTO: 2 % (ref 0.3–6.2)
ERYTHROCYTE [DISTWIDTH] IN BLOOD BY AUTOMATED COUNT: 11.2 % (ref 12.3–15.4)
GLOBULIN UR ELPH-MCNC: 2.2 GM/DL
GLUCOSE SERPL-MCNC: 100 MG/DL (ref 65–99)
HCT VFR BLD AUTO: 37.9 % (ref 34–46.6)
HGB BLD-MCNC: 13.1 G/DL (ref 12–15.9)
IMM GRANULOCYTES # BLD AUTO: 0 10*3/MM3 (ref 0–0.05)
IMM GRANULOCYTES NFR BLD AUTO: 0 % (ref 0–0.5)
LYMPHOCYTES # BLD AUTO: 1.33 10*3/MM3 (ref 0.7–3.1)
LYMPHOCYTES NFR BLD AUTO: 38.8 % (ref 19.6–45.3)
MCH RBC QN AUTO: 33.3 PG (ref 26.6–33)
MCHC RBC AUTO-ENTMCNC: 34.6 G/DL (ref 31.5–35.7)
MCV RBC AUTO: 96.4 FL (ref 79–97)
MONOCYTES # BLD AUTO: 0.28 10*3/MM3 (ref 0.1–0.9)
MONOCYTES NFR BLD AUTO: 8.2 % (ref 5–12)
NEUTROPHILS NFR BLD AUTO: 1.73 10*3/MM3 (ref 1.7–7)
NEUTROPHILS NFR BLD AUTO: 50.4 % (ref 42.7–76)
NRBC BLD AUTO-RTO: 0 /100 WBC (ref 0–0.2)
PLATELET # BLD AUTO: 276 10*3/MM3 (ref 140–450)
PMV BLD AUTO: 10.1 FL (ref 6–12)
POTASSIUM SERPL-SCNC: 4.4 MMOL/L (ref 3.5–5.2)
PROT SERPL-MCNC: 6.6 G/DL (ref 6–8.5)
RBC # BLD AUTO: 3.93 10*6/MM3 (ref 3.77–5.28)
SODIUM SERPL-SCNC: 140 MMOL/L (ref 136–145)
WBC NRBC COR # BLD: 3.43 10*3/MM3 (ref 3.4–10.8)

## 2023-09-20 PROCEDURE — 80053 COMPREHEN METABOLIC PANEL: CPT

## 2023-09-20 PROCEDURE — 84439 ASSAY OF FREE THYROXINE: CPT | Performed by: NURSE PRACTITIONER

## 2023-09-20 PROCEDURE — 80050 GENERAL HEALTH PANEL: CPT

## 2023-09-20 PROCEDURE — 36415 COLL VENOUS BLD VENIPUNCTURE: CPT

## 2023-09-21 DIAGNOSIS — N92.1 METRORRHAGIA: Primary | ICD-10-CM

## 2023-09-21 LAB
T4 FREE SERPL-MCNC: 1.38 NG/DL (ref 0.93–1.7)
TSH SERPL DL<=0.05 MIU/L-ACNC: 1.04 UIU/ML (ref 0.27–4.2)

## 2023-09-28 LAB — REF LAB TEST METHOD: NORMAL

## 2023-10-17 ENCOUNTER — TELEPHONE (OUTPATIENT)
Dept: ONCOLOGY | Facility: HOSPITAL | Age: 38
End: 2023-10-17

## 2023-10-17 DIAGNOSIS — C92.10 CML (CHRONIC MYELOCYTIC LEUKEMIA): Primary | ICD-10-CM

## 2023-10-17 NOTE — TELEPHONE ENCOUNTER
Caller: Sailaja Mercer    Relationship to patient: Self    Best call back number: [phone]    Type of visit: FOLLOW UP    Requested date: LATEST TIME AVAILABLE     If rescheduling, when is the original appointment: 10/05     Additional notes: PLEASE CALL TO RESCHEDULE.

## 2023-10-23 ENCOUNTER — HOSPITAL ENCOUNTER (OUTPATIENT)
Dept: ULTRASOUND IMAGING | Facility: HOSPITAL | Age: 38
Discharge: HOME | End: 2023-10-23
Admitting: NURSE PRACTITIONER
Payer: COMMERCIAL

## 2023-10-23 DIAGNOSIS — C92.10 CML (CHRONIC MYELOCYTIC LEUKEMIA): ICD-10-CM

## 2023-10-23 DIAGNOSIS — N92.1 METRORRHAGIA: ICD-10-CM

## 2023-10-23 PROCEDURE — 76830 TRANSVAGINAL US NON-OB: CPT

## 2023-10-23 PROCEDURE — 76856 US EXAM PELVIC COMPLETE: CPT

## 2023-10-23 RX ORDER — IMATINIB MESYLATE 400 MG/1
400 TABLET, FILM COATED ORAL DAILY
Qty: 30 TABLET | Refills: 11 | Status: SHIPPED | OUTPATIENT
Start: 2023-10-23 | End: 2023-10-27

## 2023-10-23 NOTE — TELEPHONE ENCOUNTER
Caller: [facility] - [place], [place] - [address] - [phone] [facility] [phone] FX    Relationship: Pharmacy    Best call back number: [phone]    Requested Prescriptions:   Requested Prescriptions     Pending Prescriptions Disp Refills    imatinib (GLEEVEC) 400 MG chemo tablet 30 tablet 11     Sig: Take 1 tablet by mouth Daily.        Pharmacy where request should be sent: [facility] - [place], [place] - [address] - [phone] [facility] [phone] FX     Last office visit with prescribing clinician: 4/12/2023   Last telemedicine visit with prescribing clinician: Visit date not found   Next office visit with prescribing clinician: 12/5/2023     Additional details provided by patient: SHE IS COMPLETELY OUT.    Does the patient have less than a 3 day supply:  [x] Yes  [] No    Would you like a call back once the refill request has been completed: [] Yes [x] No    If the office needs to give you a call back, can they leave a voicemail: [] Yes [x] No    Pat Brooke Rep   10/23/23 10:45 EDT

## 2023-10-27 ENCOUNTER — SPECIALTY PHARMACY (OUTPATIENT)
Dept: PHARMACY | Facility: HOSPITAL | Age: 38
End: 2023-10-27
Payer: COMMERCIAL

## 2023-10-27 DIAGNOSIS — C92.10 CML (CHRONIC MYELOCYTIC LEUKEMIA): ICD-10-CM

## 2023-10-27 RX ORDER — IMATINIB MESYLATE 400 MG/1
400 TABLET, FILM COATED ORAL DAILY
Qty: 30 TABLET | Refills: 11 | Status: SHIPPED | OUTPATIENT
Start: 2023-10-27

## 2023-10-27 NOTE — PROGRESS NOTES
Re: Refills of Oral Specialty Medication - Gleevec (imatinib)    Drug-Drug Interactions: The current medication list was reviewed and there are no relevant drug-drug interactions with the specialty medication.  Medication Allergies: The patient has no relevant allergies as it relates to their oral specialty medication  Review of Labs/Dose Adjustments: NO DOSE CHANGE - I reviewed the most recent note and labs and the patient will continue without any dose changes.  I sent refills as described below.    Drug: Gleevec (imatinib)  Strength: 400 mg  Directions: Take 1 tablet by mouth daily  Quantity: 30  Refills: 11  Pharmacy prescription sent to: [facility]      Syeda Grande, PharmD, BCPS  Oncology Clinical Pharmacist  10/27/2023  09:12 EDT

## 2023-11-01 ENCOUNTER — OFFICE VISIT (OUTPATIENT)
Dept: ORTHOPEDIC SURGERY | Facility: CLINIC | Age: 38
End: 2023-11-01
Payer: COMMERCIAL

## 2023-11-01 ENCOUNTER — PREP FOR SURGERY (OUTPATIENT)
Dept: OTHER | Facility: HOSPITAL | Age: 38
End: 2023-11-01
Payer: COMMERCIAL

## 2023-11-01 VITALS — WEIGHT: 119 LBS | BODY MASS INDEX: 23.36 KG/M2 | HEIGHT: 60 IN

## 2023-11-01 DIAGNOSIS — G56.03 CARPAL TUNNEL SYNDROME, BILATERAL: Primary | ICD-10-CM

## 2023-11-01 PROBLEM — G56.01 CARPAL TUNNEL SYNDROME OF RIGHT WRIST: Status: ACTIVE | Noted: 2023-11-01

## 2023-11-01 RX ORDER — CEFAZOLIN SODIUM IN 0.9 % NACL 3 G/100 ML
3 INTRAVENOUS SOLUTION, PIGGYBACK (ML) INTRAVENOUS ONCE
OUTPATIENT
Start: 2023-11-01 | End: 2023-11-01

## 2023-11-01 RX ORDER — CEFAZOLIN SODIUM 2 G/100ML
2 INJECTION, SOLUTION INTRAVENOUS ONCE
OUTPATIENT
Start: 2023-11-01 | End: 2023-11-01

## 2023-11-01 NOTE — PROGRESS NOTES
"Chief Complaint  Follow-up of the Left Hand and Follow-up of the Right Hand     Subjective      Sailaja Mercer presents to [facility] for follow up of bilateral hands. Her right hand is worse then the left.  She has tried bracing of bilateral hands.  She has numbness and tingling for awhile.  She is having increase difficulty with  and FMC tasks.  She had an injection 6/23/23 that gave some relief. She notes the left one is doing well. She has had a EMG. She has tried bracing that makes it feel more swollen.  She notes she has difficulty with sleeping.     Allergies   Allergen Reactions    Nickel Rash        Social History     Socioeconomic History    Marital status:    Tobacco Use    Smoking status: Never    Smokeless tobacco: Never   Vaping Use    Vaping Use: Never used   Substance and Sexual Activity    Alcohol use: Not Currently     Comment: Current some day    Drug use: Never    Sexual activity: Yes     Partners: Male     Birth control/protection: Pill, OCP        I reviewed the patient's chief complaint, history of present illness, review of systems, past medical history, surgical history, family history, social history, medications, and allergy list.     Review of Systems     Constitutional: Denies fevers, chills, weight loss  Cardiovascular: Denies chest pain, shortness of breath  Skin: Denies rashes, acute skin changes  Neurologic: Denies headache, loss of consciousness        Vital Signs:   Ht 152.4 cm (60\")   Wt 54 kg (119 lb)   BMI 23.24 kg/m²          Physical Exam  General: Alert. No acute distress    Ortho Exam        BILATERAL HANDS Positive Compression testing/ Positive Tinels. NegativeFinkelsteins. Negative Leung's testing. Negative CMC grind testing. Positive Phalens. Full ROM of the hand, fingers, elbow and wrist. Negative Triggering of the digit. Sensation grossly intact to light touch, median, radial and ulnar nerve. Positive AIN, PIN and ulnar nerve " motor function intact. Axillary nerve intact. Positive pulses.        Procedures        Imaging Results (Most Recent)       None             Result Review :      6/8/23  Nerve conduction studies abnormal shows electrophysiologic evidence for mild carpal tunnel syndrome. There is conduction block in the right wrist suggesting that it is worse than it is currently.      Assessment and Plan     Diagnoses and all orders for this visit:    1. Carpal tunnel syndrome, bilateral (Primary)        Discussed the treatment plan with the patient.     Discussed the treatment options with the patient, operative vs non-operative.     The patient expressed understanding and wished to proceed with a right carpal tunnel release.     Discussed surgery., Risks/benefits discussed with patient including, but not limited to: infection, bleeding, neurovascular damage, malunion, nonunion, aesthetic deformity, need for further surgery, and death., Surgery pamphlet given., and Call or return if worsening symptoms.    Follow Up     2 weeks postoperatively       Patient was given instructions and counseling regarding her condition or for health maintenance advice. Please see specific information pulled into the AVS if appropriate.     Scribed for Eric Devine MD by Felecia Calle MA.  11/01/23   09:37 EDT    I have personally performed the services described in this document as scribed by the above individual and it is both accurate and complete. Eric Devine MD 11/02/23

## 2023-11-14 ENCOUNTER — OFFICE VISIT (OUTPATIENT)
Dept: OBSTETRICS AND GYNECOLOGY | Facility: CLINIC | Age: 38
End: 2023-11-14
Payer: COMMERCIAL

## 2023-11-14 VITALS
HEIGHT: 60 IN | HEART RATE: 80 BPM | BODY MASS INDEX: 24.15 KG/M2 | WEIGHT: 123 LBS | DIASTOLIC BLOOD PRESSURE: 77 MMHG | SYSTOLIC BLOOD PRESSURE: 110 MMHG

## 2023-11-14 DIAGNOSIS — Z30.41 ENCOUNTER FOR SURVEILLANCE OF CONTRACEPTIVE PILLS: ICD-10-CM

## 2023-11-14 DIAGNOSIS — N93.9 ABNORMAL UTERINE BLEEDING (AUB): Primary | ICD-10-CM

## 2023-11-14 PROBLEM — Z01.419 WOMEN'S ANNUAL ROUTINE GYNECOLOGICAL EXAMINATION: Status: RESOLVED | Noted: 2022-06-01 | Resolved: 2023-11-14

## 2023-11-14 PROCEDURE — 99213 OFFICE O/P EST LOW 20 MIN: CPT | Performed by: OBSTETRICS & GYNECOLOGY

## 2023-11-14 NOTE — PROGRESS NOTES
"GYN Visit    CC: Possible endometrial polyp    HPI:   38 y.o. who presents in follow-up of a pelvic ultrasound for irregular menses.  The patient is on lo Loestrin, and has been for many years.  At the end of August she had significant change in her menstrual cycle where she was having essentially daily bleeding.  At times the bleeding was very heavy.  A pelvic ultrasound was done showing a possible endometrial polyp.  Since that time the patient's menstrual cycle has returned to normal she has had no other problems.    History: PMHx, Meds, Allergies, PSHx, Social Hx, and POBHx all reviewed and updated.    /77   Pulse 80   Ht 152.4 cm (60\")   Wt 55.8 kg (123 lb)   LMP 10/31/2023 (Approximate) Comment: A few days of spotting  Breastfeeding No   BMI 24.02 kg/m²     Physical Exam  Vitals and nursing note reviewed.   Constitutional:       General: She is not in acute distress.     Appearance: Normal appearance. She is not ill-appearing.   Neurological:      Mental Status: She is alert and oriented to person, place, and time.   Psychiatric:         Mood and Affect: Mood normal.         Behavior: Behavior normal.         Thought Content: Thought content normal.         Judgment: Judgment normal.           ASSESSMENT AND PLAN:  Diagnoses and all orders for this visit:    1. Abnormal uterine bleeding (AUB) (Primary)  Assessment & Plan:  The patient's abnormal uterine bleeding has resolved.  I have reviewed the patient's pelvic ultrasound.  The patient's endometrium is very thin which is compatible with the use of this combined oral contraceptive which is the lowest dose of estrogen available on the market with the fewest pill free interval days.  Given how thin the endometrial lining is I would be surprised if the area of concern is actually an endometrial polyp or not, especially given the patient's symptoms have resolved.  I recommended we continue to monitor the situation.  If the patient's bleeding " returns to abnormal then we can consider proceeding with a hysteroscopy D&C or a saline ultrasound.  If she has no further abnormal uterine bleeding I doubt that this was truly an endometrial polyp.      2. Encounter for surveillance of contraceptive pills  Assessment & Plan:  Continue lo Loestrin FE 1 tablet oral daily.  The risks, benefits, and alternatives were reviewed including the risks of VTE.          Counseling: TRACK MENSES, RTO if <q21 days (frequent) or >q3mo (infrequent IF not on hormonal BC), >7d long, heavy, or painful.    All BIRTH CONTROL options R/B/A/SE/E of each reviewed in detail.  JANA risk w hormonal BIRTH CONTROL reviewed (estrogen containing only), S/Sx to watch for discussed and questions answered.  Newer studies indicate possible increased breast cancer reviewed (both estrogen and progestin only).    Follow Up:  Return in about 2 months (around 1/14/2024) for Recheck.      Santana Holt MD  11/14/2023

## 2023-11-15 PROBLEM — Z30.41 ENCOUNTER FOR SURVEILLANCE OF CONTRACEPTIVE PILLS: Status: ACTIVE | Noted: 2023-11-15

## 2023-11-15 PROBLEM — Z30.09 BIRTH CONTROL COUNSELING: Status: RESOLVED | Noted: 2022-06-02 | Resolved: 2023-11-15

## 2023-11-15 PROBLEM — N93.9 ABNORMAL UTERINE BLEEDING (AUB): Status: ACTIVE | Noted: 2023-11-15

## 2023-11-15 NOTE — ASSESSMENT & PLAN NOTE
Continue lo Loestrin FE 1 tablet oral daily.  The risks, benefits, and alternatives were reviewed including the risks of VTE.

## 2023-11-15 NOTE — ASSESSMENT & PLAN NOTE
The patient's abnormal uterine bleeding has resolved.  I have reviewed the patient's pelvic ultrasound.  The patient's endometrium is very thin which is compatible with the use of this combined oral contraceptive which is the lowest dose of estrogen available on the market with the fewest pill free interval days.  Given how thin the endometrial lining is I would be surprised if the area of concern is actually an endometrial polyp or not, especially given the patient's symptoms have resolved.  I recommended we continue to monitor the situation.  If the patient's bleeding returns to abnormal then we can consider proceeding with a hysteroscopy D&C or a saline ultrasound.  If she has no further abnormal uterine bleeding I doubt that this was truly an endometrial polyp.

## 2023-12-06 ENCOUNTER — TELEPHONE (OUTPATIENT)
Dept: ONCOLOGY | Facility: HOSPITAL | Age: 38
End: 2023-12-06
Payer: COMMERCIAL

## 2023-12-06 NOTE — TELEPHONE ENCOUNTER
Caller: Sailaja Mercer    Relationship to patient: Self    Best call back number: [phone]    Patient is needing: TO R/S 12-5-23 F/U APPT TO THE WEEK OF 12-18-23 OR THE FOLLOWING.

## 2023-12-14 ENCOUNTER — ANESTHESIA EVENT (OUTPATIENT)
Dept: PERIOP | Facility: HOSPITAL | Age: 38
End: 2023-12-14
Payer: COMMERCIAL

## 2023-12-14 ENCOUNTER — ANESTHESIA (OUTPATIENT)
Dept: PERIOP | Facility: HOSPITAL | Age: 38
End: 2023-12-14
Payer: COMMERCIAL

## 2023-12-14 ENCOUNTER — HOSPITAL ENCOUNTER (OUTPATIENT)
Facility: HOSPITAL | Age: 38
Discharge: HOME | End: 2023-12-14
Attending: ORTHOPAEDIC SURGERY | Admitting: ORTHOPAEDIC SURGERY
Payer: COMMERCIAL

## 2023-12-14 VITALS
TEMPERATURE: 97.2 F | OXYGEN SATURATION: 100 % | RESPIRATION RATE: 21 BRPM | WEIGHT: 120.15 LBS | SYSTOLIC BLOOD PRESSURE: 95 MMHG | HEIGHT: 60 IN | BODY MASS INDEX: 23.59 KG/M2 | DIASTOLIC BLOOD PRESSURE: 59 MMHG | HEART RATE: 89 BPM

## 2023-12-14 DIAGNOSIS — G56.03 CARPAL TUNNEL SYNDROME, BILATERAL: ICD-10-CM

## 2023-12-14 LAB — B-HCG UR QL: NEGATIVE

## 2023-12-14 PROCEDURE — 25010000002 BUPIVACAINE (PF) 0.5 % SOLUTION: Performed by: ORTHOPAEDIC SURGERY

## 2023-12-14 PROCEDURE — 25010000002 CEFAZOLIN IN DEXTROSE 2000 MG/ 100 ML SOLUTION: Performed by: ORTHOPAEDIC SURGERY

## 2023-12-14 PROCEDURE — 25010000002 FENTANYL CITRATE (PF) 50 MCG/ML SOLUTION

## 2023-12-14 PROCEDURE — 25010000002 KETOROLAC TROMETHAMINE PER 15 MG

## 2023-12-14 PROCEDURE — S0260 H&P FOR SURGERY: HCPCS | Performed by: ORTHOPAEDIC SURGERY

## 2023-12-14 PROCEDURE — 25010000002 DEXAMETHASONE PER 1 MG

## 2023-12-14 PROCEDURE — 25010000002 ONDANSETRON PER 1 MG

## 2023-12-14 PROCEDURE — 64721 CARPAL TUNNEL SURGERY: CPT | Performed by: ORTHOPAEDIC SURGERY

## 2023-12-14 PROCEDURE — 81025 URINE PREGNANCY TEST: CPT | Performed by: ORTHOPAEDIC SURGERY

## 2023-12-14 PROCEDURE — 25810000003 LACTATED RINGERS PER 1000 ML: Performed by: ANESTHESIOLOGY

## 2023-12-14 PROCEDURE — 25010000002 PROPOFOL 10 MG/ML EMULSION

## 2023-12-14 PROCEDURE — 25010000002 MIDAZOLAM PER 1MG: Performed by: ANESTHESIOLOGY

## 2023-12-14 RX ORDER — ACETAMINOPHEN 500 MG
1000 TABLET ORAL ONCE
Status: COMPLETED | OUTPATIENT
Start: 2023-12-14 | End: 2023-12-14

## 2023-12-14 RX ORDER — PHENYLEPHRINE HCL IN 0.9% NACL 1 MG/10 ML
SYRINGE (ML) INTRAVENOUS AS NEEDED
Status: DISCONTINUED | OUTPATIENT
Start: 2023-12-14 | End: 2023-12-14 | Stop reason: SURG

## 2023-12-14 RX ORDER — CEFAZOLIN SODIUM 2 G/100ML
2 INJECTION, SOLUTION INTRAVENOUS ONCE
Status: COMPLETED | OUTPATIENT
Start: 2023-12-14 | End: 2023-12-14

## 2023-12-14 RX ORDER — MIDAZOLAM HYDROCHLORIDE 2 MG/2ML
2 INJECTION, SOLUTION INTRAMUSCULAR; INTRAVENOUS ONCE
Status: COMPLETED | OUTPATIENT
Start: 2023-12-14 | End: 2023-12-14

## 2023-12-14 RX ORDER — PROMETHAZINE HYDROCHLORIDE 25 MG/1
25 SUPPOSITORY RECTAL ONCE AS NEEDED
Status: DISCONTINUED | OUTPATIENT
Start: 2023-12-14 | End: 2023-12-14 | Stop reason: HOSPADM

## 2023-12-14 RX ORDER — BUPIVACAINE HYDROCHLORIDE 5 MG/ML
INJECTION, SOLUTION EPIDURAL; INTRACAUDAL AS NEEDED
Status: DISCONTINUED | OUTPATIENT
Start: 2023-12-14 | End: 2023-12-14 | Stop reason: HOSPADM

## 2023-12-14 RX ORDER — OXYCODONE HYDROCHLORIDE 5 MG/1
5 TABLET ORAL
Status: DISCONTINUED | OUTPATIENT
Start: 2023-12-14 | End: 2023-12-14 | Stop reason: HOSPADM

## 2023-12-14 RX ORDER — ONDANSETRON 2 MG/ML
4 INJECTION INTRAMUSCULAR; INTRAVENOUS ONCE AS NEEDED
Status: DISCONTINUED | OUTPATIENT
Start: 2023-12-14 | End: 2023-12-14 | Stop reason: HOSPADM

## 2023-12-14 RX ORDER — DEXAMETHASONE SODIUM PHOSPHATE 4 MG/ML
INJECTION, SOLUTION INTRA-ARTICULAR; INTRALESIONAL; INTRAMUSCULAR; INTRAVENOUS; SOFT TISSUE AS NEEDED
Status: DISCONTINUED | OUTPATIENT
Start: 2023-12-14 | End: 2023-12-14 | Stop reason: SURG

## 2023-12-14 RX ORDER — KETOROLAC TROMETHAMINE 30 MG/ML
INJECTION, SOLUTION INTRAMUSCULAR; INTRAVENOUS AS NEEDED
Status: DISCONTINUED | OUTPATIENT
Start: 2023-12-14 | End: 2023-12-14 | Stop reason: SURG

## 2023-12-14 RX ORDER — LIDOCAINE HYDROCHLORIDE 20 MG/ML
INJECTION, SOLUTION EPIDURAL; INFILTRATION; INTRACAUDAL; PERINEURAL AS NEEDED
Status: DISCONTINUED | OUTPATIENT
Start: 2023-12-14 | End: 2023-12-14 | Stop reason: SURG

## 2023-12-14 RX ORDER — SODIUM CHLORIDE, SODIUM LACTATE, POTASSIUM CHLORIDE, CALCIUM CHLORIDE 600; 310; 30; 20 MG/100ML; MG/100ML; MG/100ML; MG/100ML
9 INJECTION, SOLUTION INTRAVENOUS CONTINUOUS PRN
Status: DISCONTINUED | OUTPATIENT
Start: 2023-12-14 | End: 2023-12-14 | Stop reason: HOSPADM

## 2023-12-14 RX ORDER — DEXMEDETOMIDINE HYDROCHLORIDE 100 UG/ML
INJECTION, SOLUTION INTRAVENOUS AS NEEDED
Status: DISCONTINUED | OUTPATIENT
Start: 2023-12-14 | End: 2023-12-14 | Stop reason: SURG

## 2023-12-14 RX ORDER — CEFAZOLIN SODIUM IN 0.9 % NACL 3 G/100 ML
3 INTRAVENOUS SOLUTION, PIGGYBACK (ML) INTRAVENOUS ONCE
Status: DISCONTINUED | OUTPATIENT
Start: 2023-12-14 | End: 2023-12-14

## 2023-12-14 RX ORDER — MEPERIDINE HYDROCHLORIDE 25 MG/ML
12.5 INJECTION INTRAMUSCULAR; INTRAVENOUS; SUBCUTANEOUS
Status: DISCONTINUED | OUTPATIENT
Start: 2023-12-14 | End: 2023-12-14 | Stop reason: HOSPADM

## 2023-12-14 RX ORDER — HYDROCODONE BITARTRATE AND ACETAMINOPHEN 7.5; 325 MG/1; MG/1
1 TABLET ORAL EVERY 4 HOURS PRN
Qty: 12 TABLET | Refills: 0 | Status: SHIPPED | OUTPATIENT
Start: 2023-12-14

## 2023-12-14 RX ORDER — PROPOFOL 10 MG/ML
VIAL (ML) INTRAVENOUS AS NEEDED
Status: DISCONTINUED | OUTPATIENT
Start: 2023-12-14 | End: 2023-12-14 | Stop reason: SURG

## 2023-12-14 RX ORDER — PROMETHAZINE HYDROCHLORIDE 12.5 MG/1
25 TABLET ORAL ONCE AS NEEDED
Status: DISCONTINUED | OUTPATIENT
Start: 2023-12-14 | End: 2023-12-14 | Stop reason: HOSPADM

## 2023-12-14 RX ORDER — FENTANYL CITRATE 50 UG/ML
INJECTION, SOLUTION INTRAMUSCULAR; INTRAVENOUS AS NEEDED
Status: DISCONTINUED | OUTPATIENT
Start: 2023-12-14 | End: 2023-12-14 | Stop reason: SURG

## 2023-12-14 RX ORDER — ONDANSETRON 2 MG/ML
INJECTION INTRAMUSCULAR; INTRAVENOUS AS NEEDED
Status: DISCONTINUED | OUTPATIENT
Start: 2023-12-14 | End: 2023-12-14 | Stop reason: SURG

## 2023-12-14 RX ADMIN — DEXAMETHASONE SODIUM PHOSPHATE 4 MG: 4 INJECTION, SOLUTION INTRAMUSCULAR; INTRAVENOUS at 07:30

## 2023-12-14 RX ADMIN — FENTANYL CITRATE 50 MCG: 50 INJECTION, SOLUTION INTRAMUSCULAR; INTRAVENOUS at 07:35

## 2023-12-14 RX ADMIN — PROPOFOL 150 MG: 10 INJECTION, EMULSION INTRAVENOUS at 07:24

## 2023-12-14 RX ADMIN — SODIUM CHLORIDE, POTASSIUM CHLORIDE, SODIUM LACTATE AND CALCIUM CHLORIDE 9 ML/HR: 600; 310; 30; 20 INJECTION, SOLUTION INTRAVENOUS at 07:15

## 2023-12-14 RX ADMIN — Medication 100 MCG: at 07:45

## 2023-12-14 RX ADMIN — CEFAZOLIN SODIUM 2 G: 2 INJECTION, SOLUTION INTRAVENOUS at 07:30

## 2023-12-14 RX ADMIN — MIDAZOLAM HYDROCHLORIDE 2 MG: 1 INJECTION, SOLUTION INTRAMUSCULAR; INTRAVENOUS at 07:15

## 2023-12-14 RX ADMIN — LIDOCAINE HYDROCHLORIDE 40 MG: 20 INJECTION, SOLUTION EPIDURAL; INFILTRATION; INTRACAUDAL; PERINEURAL at 07:24

## 2023-12-14 RX ADMIN — DEXMEDETOMIDINE 20 MCG: 100 INJECTION, SOLUTION, CONCENTRATE INTRAVENOUS at 07:37

## 2023-12-14 RX ADMIN — ONDANSETRON 4 MG: 2 INJECTION INTRAMUSCULAR; INTRAVENOUS at 07:30

## 2023-12-14 RX ADMIN — KETOROLAC TROMETHAMINE 15 MG: 30 INJECTION, SOLUTION INTRAMUSCULAR; INTRAVENOUS at 07:47

## 2023-12-14 RX ADMIN — FENTANYL CITRATE 50 MCG: 50 INJECTION, SOLUTION INTRAMUSCULAR; INTRAVENOUS at 07:24

## 2023-12-14 RX ADMIN — ACETAMINOPHEN 1000 MG: 500 TABLET ORAL at 07:13

## 2023-12-14 NOTE — DISCHARGE INSTRUCTIONS
DISCHARGE INSTRUCTIONS  CARPAL TUNNEL RELEASE      I have read and received the above instructions.   DISCHARGE INSTRUCTIONS  CARPAL TUNNEL RELEASE      For your surgery you had:  General anesthesia (you may have a sore throat for the first 24 hours)  IV sedation  Local anesthesia  Monitored anesthesia care  Regional Anesthesia    You have received an anesthesia medication today that can cause hormonal forms of birth control to be ineffective. You should use a different form of birth control (to prevent pregnancy) for 7 days.   You may experience dizziness, drowsiness, or lightheadedness for several hours following surgery.  Do not stay alone today or tonight.  Limit your activity for 24 hours.  Resume your diet slowly.    You should not drive or operate machinery, drink alcohol, or sign legally binding documents for 24 hours or while you are taking pain medication.    Use ice to affected area for 48-72 hours. Apply 20 minutes on - 20 minutes off. Do NOT apply directly to skin.  Exercise fingers frequently by making a full fist and fully straightening the fingers. This will help prevent swelling and stiffness.  Do NOT do any heavy lifting, pulling or strenuous activities using the affected hand. [x] Keep splint clean and dry.  [x] Do NOT submerge in water.  [x] Keep incision area clean and dry.  [x] You may shower     .  NOTIFY YOUR DOCTOR IF YOU EXPERIENCE ANY OF THE FOLLOWING:  Temperature greater than 101 degrees Fahrenheit  Shaking Chills  Redness or excessive drainage from incision  Nausea, vomiting and/or pain that is not controlled by prescribed medications  Increase in bleeding or bleeding that is excessive  Unable to urinate in 6 hours after surgery  If unable to reach your doctor, please go to the closest Emergency Room  Last dose of pain medication was given at: tylenol at 0710am May start pain meds after 1120   .  You should see   for follow-up care   on   .  Phone number:      SPECIAL  INSTRUCTIONS:               I have read and received the above instructions.

## 2023-12-14 NOTE — OP NOTE
CARPAL TUNNEL RELEASE  Procedure Report    Patient Name:  Sailaja Mercer  YOB: 1985    Date of Surgery:  12/14/2023       Pre-op Diagnosis:   Carpal tunnel syndrome, bilateral [G56.03]       Post-Op Diagnosis Codes:     * Carpal tunnel syndrome, bilateral [G56.03]    Procedure/CPT® Codes:      Procedure(s):  Right CARPAL TUNNEL RELEASE    Staff:  Surgeon(s):  Eric Devine MD    Assistant: Beth Hart    Anesthesia: General    Estimated Blood Loss: 2 mL    Implants:    Nothing was implanted during the procedure    Specimen:          None      Complications: None    Description of Procedure:   RIGHT CARPAL TUNNEL RELEASE:  The right hand and upper extremity were prepped and draped satisfactorily using alcohol and ChloraPrep.      A standard incision was made just ulnar to the thenar crease to the thumb, approximately 1.5 cm length, carried down through subcutaneous tissue and fascia, carried down to the palmaris fascia with tenotomy scissors.  Using a 69 blade the transverse carpal ligament was released over the median nerve.  The median nerve had significant signs of compression.  A [place] elevator was used to protect the nerves during the release and after release had been completed, it was checked proximally and distally and under loupe magnification was satisfactory.  The nerve was inspected and was intact along with the motor branch.  The wound was copiously irrigated.  The skin was closed with horizontal mattress suture nylon and interrupted 3-0 horizontal mattress sutures and the incision was washed and dried and sterile dressings were applied.        Eric Devine MD     Date: 12/14/2023  Time: 07:50 EST

## 2023-12-14 NOTE — ANESTHESIA POSTPROCEDURE EVALUATION
Patient: Sailaja Mercer    Procedure Summary       Date: 12/14/23 Room / Location: [facility]  / [facility]    Anesthesia Start: 0720 Anesthesia Stop: 0755    Procedure: CARPAL TUNNEL RELEASE (Right: Wrist) Diagnosis:       Carpal tunnel syndrome, bilateral      (Carpal tunnel syndrome, bilateral [G56.03])    Surgeons: Eric Devine MD Provider: Monique Sharp MD    Anesthesia Type: general, MAC ASA Status: 2            Anesthesia Type: general, MAC    Vitals  Vitals Value Taken Time   BP 85/60 12/14/23 0829   Temp 36.2 °C (97.2 °F) 12/14/23 0826   Pulse 92 12/14/23 0831   Resp 10 12/14/23 0805   SpO2 100 % 12/14/23 0831   Vitals shown include unfiled device data.        Post Anesthesia Care and Evaluation    Patient location during evaluation: bedside  Patient participation: complete - patient participated  Level of consciousness: awake  Pain management: adequate    Airway patency: patent  PONV Status: none  Cardiovascular status: acceptable and stable  Respiratory status: acceptable  Hydration status: acceptable    Comments: An Anesthesiologist personally participated in the most demanding procedures (including induction and emergence if applicable) in the anesthesia plan, monitored the course of anesthesia administration at frequent intervals and remained physically present and available for immediate diagnosis and treatment of emergencies.

## 2023-12-14 NOTE — ANESTHESIA PREPROCEDURE EVALUATION
Anesthesia Evaluation     Patient summary reviewed and Nursing notes reviewed   no history of anesthetic complications:   NPO Solid Status: > 8 hours  NPO Liquid Status: > 2 hours           Airway   Mallampati: II  TM distance: >3 FB  Neck ROM: full  No difficulty expected  Dental      Pulmonary - negative pulmonary ROS and normal exam    breath sounds clear to auscultation  Cardiovascular - negative cardio ROS and normal exam  Exercise tolerance: good (4-7 METS)    Rhythm: regular  Rate: normal        Neuro/Psych  (+) numbness, psychiatric history Anxiety  GI/Hepatic/Renal/Endo - negative ROS     Musculoskeletal (-) negative ROS    Abdominal    Substance History - negative use     OB/GYN negative ob/gyn ROS         Other   blood dyscrasia,   history of cancer (CML)    ROS/Med Hx Other: PAT Nursing Notes unavailable.               Anesthesia Plan    ASA 2     general and MAC     (Patient understands anesthesia not responsible for dental damage.)  intravenous induction     Anesthetic plan, risks, benefits, and alternatives have been provided, discussed and informed consent has been obtained with: patient.    Use of blood products discussed with patient .    Plan discussed with CRNA.    CODE STATUS:

## 2023-12-14 NOTE — H&P
"H and p      Chief Complaint  Follow-up of the Left Hand and Follow-up of the Right Hand        Subjective   Sailaja Mercer presents to [facility] for follow up of bilateral hands. Her right hand is worse then the left.  She has tried bracing of bilateral hands.  She has numbness and tingling for awhile.  She is having increase difficulty with  and FMC tasks.  She had an injection 6/23/23 that gave some relief. She notes the left one is doing well. She has had a EMG. She has tried bracing that makes it feel more swollen.  She notes she has difficulty with sleeping.           Allergies   Allergen Reactions    Nickel Rash         Social History   Social History            Socioeconomic History    Marital status:    Tobacco Use    Smoking status: Never    Smokeless tobacco: Never   Vaping Use    Vaping Use: Never used   Substance and Sexual Activity    Alcohol use: Not Currently       Comment: Current some day    Drug use: Never    Sexual activity: Yes       Partners: Male       Birth control/protection: Pill, OCP            I reviewed the patient's chief complaint, history of present illness, review of systems, past medical history, surgical history, family history, social history, medications, and allergy list.      Review of Systems      Constitutional: Denies fevers, chills, weight loss  Cardiovascular: Denies chest pain, shortness of breath  Skin: Denies rashes, acute skin changes  Neurologic: Denies headache, loss of consciousness           Vital Signs:   Ht 152.4 cm (60\")   Wt 54 kg (119 lb)   BMI 23.24 kg/m²           Physical Exam  General: Alert. No acute distress     Ortho Exam          BILATERAL HANDS Positive Compression testing/ Positive Tinels. NegativeFinkelsteins. Negative Leung's testing. Negative CMC grind testing. Positive Phalens. Full ROM of the hand, fingers, elbow and wrist. Negative Triggering of the digit. Sensation grossly intact to light touch, " median, radial and ulnar nerve. Positive AIN, PIN and ulnar nerve motor function intact. Axillary nerve intact. Positive pulses.          Procedures           Imaging Results (Most Recent)         None                      Result Review   :       6/8/23  Nerve conduction studies abnormal shows electrophysiologic evidence for mild carpal tunnel syndrome. There is conduction block in the right wrist suggesting that it is worse than it is currently.         Assessment   Assessment and Plan      Diagnoses and all orders for this visit:     1. Carpal tunnel syndrome, bilateral (Primary)           Discussed the treatment plan with the patient.      Discussed the treatment options with the patient, operative vs non-operative.      The patient expressed understanding and wished to proceed with a right carpal tunnel release.      Discussed surgery., Risks/benefits discussed with patient including, but not limited to: infection, bleeding, neurovascular damage, malunion, nonunion, aesthetic deformity, need for further surgery, and death., Surgery pamphlet given., and Call or return if worsening symptoms.     Follow Up      2 weeks postoperatively       Eric Devine MD  12/14/23

## 2023-12-27 ENCOUNTER — OFFICE VISIT (OUTPATIENT)
Dept: ORTHOPEDIC SURGERY | Facility: CLINIC | Age: 38
End: 2023-12-27
Payer: COMMERCIAL

## 2023-12-27 VITALS
SYSTOLIC BLOOD PRESSURE: 123 MMHG | OXYGEN SATURATION: 99 % | HEART RATE: 88 BPM | WEIGHT: 119.93 LBS | BODY MASS INDEX: 23.55 KG/M2 | DIASTOLIC BLOOD PRESSURE: 89 MMHG | HEIGHT: 60 IN

## 2023-12-27 DIAGNOSIS — Z98.890 S/P CARPAL TUNNEL RELEASE: Primary | ICD-10-CM

## 2023-12-27 PROCEDURE — 99024 POSTOP FOLLOW-UP VISIT: CPT

## 2023-12-28 ENCOUNTER — LAB (OUTPATIENT)
Dept: LAB | Facility: HOSPITAL | Age: 38
End: 2023-12-28
Payer: COMMERCIAL

## 2023-12-28 DIAGNOSIS — C92.10 CML (CHRONIC MYELOCYTIC LEUKEMIA): ICD-10-CM

## 2023-12-28 LAB
ALBUMIN SERPL-MCNC: 4.4 G/DL (ref 3.5–5.2)
ALBUMIN/GLOB SERPL: 2.1 G/DL
ALP SERPL-CCNC: 48 U/L (ref 39–117)
ALT SERPL W P-5'-P-CCNC: 10 U/L (ref 1–33)
ANION GAP SERPL CALCULATED.3IONS-SCNC: 10.1 MMOL/L (ref 5–15)
AST SERPL-CCNC: 14 U/L (ref 1–32)
BASOPHILS # BLD AUTO: 0.01 10*3/MM3 (ref 0–0.2)
BASOPHILS NFR BLD AUTO: 0.2 % (ref 0–1.5)
BILIRUB SERPL-MCNC: 0.7 MG/DL (ref 0–1.2)
BUN SERPL-MCNC: 8 MG/DL (ref 6–20)
BUN/CREAT SERPL: 8.9 (ref 7–25)
CALCIUM SPEC-SCNC: 8.8 MG/DL (ref 8.6–10.5)
CHLORIDE SERPL-SCNC: 104 MMOL/L (ref 98–107)
CO2 SERPL-SCNC: 22.9 MMOL/L (ref 22–29)
CREAT SERPL-MCNC: 0.9 MG/DL (ref 0.57–1)
DEPRECATED RDW RBC AUTO: 41.5 FL (ref 37–54)
EGFRCR SERPLBLD CKD-EPI 2021: 84.6 ML/MIN/1.73
EOSINOPHIL # BLD AUTO: 0.04 10*3/MM3 (ref 0–0.4)
EOSINOPHIL NFR BLD AUTO: 1 % (ref 0.3–6.2)
ERYTHROCYTE [DISTWIDTH] IN BLOOD BY AUTOMATED COUNT: 12 % (ref 12.3–15.4)
GLOBULIN UR ELPH-MCNC: 2.1 GM/DL
GLUCOSE SERPL-MCNC: 117 MG/DL (ref 65–99)
HCT VFR BLD AUTO: 40.6 % (ref 34–46.6)
HGB BLD-MCNC: 13.3 G/DL (ref 12–15.9)
IMM GRANULOCYTES # BLD AUTO: 0.01 10*3/MM3 (ref 0–0.05)
IMM GRANULOCYTES NFR BLD AUTO: 0.2 % (ref 0–0.5)
LYMPHOCYTES # BLD AUTO: 1.75 10*3/MM3 (ref 0.7–3.1)
LYMPHOCYTES NFR BLD AUTO: 42.7 % (ref 19.6–45.3)
MCH RBC QN AUTO: 31.1 PG (ref 26.6–33)
MCHC RBC AUTO-ENTMCNC: 32.8 G/DL (ref 31.5–35.7)
MCV RBC AUTO: 94.9 FL (ref 79–97)
MONOCYTES # BLD AUTO: 0.2 10*3/MM3 (ref 0.1–0.9)
MONOCYTES NFR BLD AUTO: 4.9 % (ref 5–12)
NEUTROPHILS NFR BLD AUTO: 2.09 10*3/MM3 (ref 1.7–7)
NEUTROPHILS NFR BLD AUTO: 51 % (ref 42.7–76)
NRBC BLD AUTO-RTO: 0 /100 WBC (ref 0–0.2)
PLATELET # BLD AUTO: 264 10*3/MM3 (ref 140–450)
PMV BLD AUTO: 10 FL (ref 6–12)
POTASSIUM SERPL-SCNC: 3.9 MMOL/L (ref 3.5–5.2)
PROT SERPL-MCNC: 6.5 G/DL (ref 6–8.5)
RBC # BLD AUTO: 4.28 10*6/MM3 (ref 3.77–5.28)
SODIUM SERPL-SCNC: 137 MMOL/L (ref 136–145)
WBC NRBC COR # BLD AUTO: 4.1 10*3/MM3 (ref 3.4–10.8)

## 2023-12-28 PROCEDURE — 36415 COLL VENOUS BLD VENIPUNCTURE: CPT

## 2023-12-28 PROCEDURE — 85025 COMPLETE CBC W/AUTO DIFF WBC: CPT

## 2023-12-28 PROCEDURE — 80053 COMPREHEN METABOLIC PANEL: CPT

## 2024-01-03 ENCOUNTER — SPECIALTY PHARMACY (OUTPATIENT)
Dept: PHARMACY | Facility: HOSPITAL | Age: 39
End: 2024-01-03
Payer: COMMERCIAL

## 2024-01-04 NOTE — PROGRESS NOTES
"Chief Complaint  Pain and Follow-up of the Right Wrist and Suture / Staple Removal    Subjective      Sailaja Mercer presents to [facility] for 2-week follow-up of right carpal tunnel release with Dr. Devine on 12/14/2023.  Patient reports that her carpal tunnel symptoms have pretty much resolved.  Reports that she has had mild numbness that she relates to the splint being tight.  Reports she is having very little pain.  Overall she is doing very well.    Objective   Allergies   Allergen Reactions    Nickel Rash       Vital Signs:   /89   Pulse 88   Ht 152.4 cm (60\")   Wt 54.4 kg (119 lb 14.9 oz)   SpO2 99%   BMI 23.42 kg/m²       Physical Exam    Constitutional: Awake, alert. Well nourished appearance.    Integumentary: Warm, dry, intact. No obvious rashes.    HENT: Atraumatic, normocephalic.   Respiratory: Non labored respirations .   Cardiovascular: Intact peripheral pulses.    Psychiatric: Normal mood and affect. A&O X3    Ortho Exam  Right hand: Near full range of motion with wrist flexion, extension, supination pronation.  She is able to make a fist.  Thumb to finger opposition is intact.  Capillary refill time is brisk.  Neurovascular intact.  Sensation is intact.  Incision is well-approximated and healing appropriately.  There is no redness, drainage or tenderness.  No edema noted throughout the hand.    Imaging Results (Most Recent)       None                      Assessment and Plan   Problem List Items Addressed This Visit    None  Visit Diagnoses       S/P carpal tunnel release    -  Primary            Follow Up   Return if symptoms worsen or fail to improve.    Patient is a non-smoker, did not discuss options for smoking cessation.     Social History     Socioeconomic History    Marital status:    Tobacco Use    Smoking status: Never    Smokeless tobacco: Never   Vaping Use    Vaping Use: Never used   Substance and Sexual Activity    Alcohol use: Not " Currently     Comment: Current some day    Drug use: Never    Sexual activity: Defer     Partners: Male     Birth control/protection: Pill, OCP       Patient Instructions   Sutures removed in office.  Patient educated on incision care.  Please keep incision clean and dry.  Do not soak or submerge in water until incision is fully healed.  Do not apply creams or lotions over the incision. Continue icing and elevation as needed to help with pain and swelling.  Ice up to 3 or 4 times daily for no longer than 15 to 20 minutes at a time.    Patient advised on return to carpal tunnel brace(s) nightly and with activity. Avoid repetitive ROM of the hand or wrist.  No heavy lifting, pushing, or pulling until incision is fully healed.  Home exercises provided today.    Follow-up as needed for worsening symptoms or failure to improve. No imaging.  Please call with questions or concerns.    Patient was given instructions and counseling regarding her condition or for health maintenance advice. Please see specific information pulled into the AVS if appropriate.

## 2024-01-04 NOTE — PATIENT INSTRUCTIONS
Sutures removed in office.  Patient educated on incision care.  Please keep incision clean and dry.  Do not soak or submerge in water until incision is fully healed.  Do not apply creams or lotions over the incision. Continue icing and elevation as needed to help with pain and swelling.  Ice up to 3 or 4 times daily for no longer than 15 to 20 minutes at a time.    Patient advised on return to carpal tunnel brace(s) nightly and with activity. Avoid repetitive ROM of the hand or wrist.  No heavy lifting, pushing, or pulling until incision is fully healed.  Home exercises provided today.    Follow-up as needed for worsening symptoms or failure to improve. No imaging.  Please call with questions or concerns.

## 2024-01-07 LAB
INTERPRETATION: POSITIVE
LAB DIRECTOR NAME PROVIDER: ABNORMAL
LABORATORY COMMENT REPORT: ABNORMAL
REF LAB TEST METHOD: ABNORMAL
T(ABL1,BCR)B2A2/CONTROL BLD/T: ABNORMAL %
T(ABL1,BCR)B3A2/CONTROL BLD/T: 0.06 %
T(ABL1,BCR)E1A2/CONTROL BLD/T: ABNORMAL %

## 2024-01-08 ENCOUNTER — TELEPHONE (OUTPATIENT)
Dept: ONCOLOGY | Facility: HOSPITAL | Age: 39
End: 2024-01-08
Payer: COMMERCIAL

## 2024-01-08 ENCOUNTER — SPECIALTY PHARMACY (OUTPATIENT)
Dept: PHARMACY | Facility: HOSPITAL | Age: 39
End: 2024-01-08
Payer: COMMERCIAL

## 2024-01-08 NOTE — TELEPHONE ENCOUNTER
Caller: IRENA     Relationship to patient: SELF    Best call back number: [phone]    Patient is needing: TO REQUEST CALL BACK ABOUT BCR ABLE TEST TO DISCUSS RESULTS.

## 2024-01-11 ENCOUNTER — OFFICE VISIT (OUTPATIENT)
Dept: ONCOLOGY | Facility: HOSPITAL | Age: 39
End: 2024-01-11
Payer: COMMERCIAL

## 2024-01-11 VITALS
BODY MASS INDEX: 23.76 KG/M2 | TEMPERATURE: 98.2 F | OXYGEN SATURATION: 100 % | HEART RATE: 81 BPM | WEIGHT: 121.03 LBS | HEIGHT: 60 IN | DIASTOLIC BLOOD PRESSURE: 77 MMHG | RESPIRATION RATE: 16 BRPM | SYSTOLIC BLOOD PRESSURE: 117 MMHG

## 2024-01-11 DIAGNOSIS — Z30.09 BIRTH CONTROL COUNSELING: ICD-10-CM

## 2024-01-11 DIAGNOSIS — C92.10 CML (CHRONIC MYELOCYTIC LEUKEMIA): Primary | ICD-10-CM

## 2024-01-11 DIAGNOSIS — R11.0 NAUSEA: ICD-10-CM

## 2024-01-11 PROCEDURE — G0463 HOSPITAL OUTPT CLINIC VISIT: HCPCS | Performed by: INTERNAL MEDICINE

## 2024-01-11 RX ORDER — ONDANSETRON 4 MG/1
4 TABLET, FILM COATED ORAL EVERY 8 HOURS PRN
Qty: 30 TABLET | Refills: 3 | Status: SHIPPED | OUTPATIENT
Start: 2024-01-11

## 2024-01-11 RX ORDER — NORETHINDRONE ACETATE AND ETHINYL ESTRADIOL, ETHINYL ESTRADIOL AND FERROUS FUMARATE 1MG-10(24)
1 KIT ORAL DAILY
Qty: 84 TABLET | Refills: 0 | Status: SHIPPED | OUTPATIENT
Start: 2024-01-11

## 2024-01-11 NOTE — TELEPHONE ENCOUNTER
Approved refill request on lo loestrin fe. Pts last annual was 06/02. She is scheduled for a GYN FU on 01/15. Needs annual scheduled for afer 06/02.

## 2024-01-11 NOTE — PROGRESS NOTES
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  She is on Gleevec started in 2012.  She is compliant with the regimen.  She notes occasional nausea from her Gleevac but takes Zofran to good effect.  She denies any other side effects of the medication.  She denies fever, chills, weight loss, night sweats, adenopathy.  She notes good appetite and her weight is maintained.  She has good energy level.  She denies excessive bruising or bleeding.    Oncology/Hematology History Overview Note   CML-dx June 2012            Clinical Staging      CHRONIC PHASE CML            Treatments      Chemotherapy      Began Gleevec Summer 2012        Cancer (Resolved)   6/28/2021 Initial Diagnosis    Cancer (CMS/HCC)     Leukemia (Resolved)   6/28/2021 Initial Diagnosis    Leukemia (CMS/HCC)     CML (chronic myelocytic leukemia)   7/15/2021 Initial Diagnosis    CML (chronic myelocytic leukemia) (CMS/HCC)     7/15/2021 Cancer Staged    Staging form: Chronic Myeloid Leukemia (Cml, AJCC 8th Edition  - Clinical: Ph+, Additional clonal changes: Absent - Signed by Sachin Walton MD on 7/15/2021      Chemotherapy    Gleevec initiated 2012         Review of Systems   Constitutional:  Negative for appetite change, diaphoresis, fatigue, fever, unexpected weight gain and unexpected weight loss.   HENT:  Negative for hearing loss, mouth sores, sore throat, swollen glands, trouble swallowing and voice change.    Eyes:  Negative for blurred vision.   Respiratory:  Negative for cough, shortness of breath and wheezing.    Cardiovascular:  Negative for chest pain and palpitations.   Gastrointestinal:  Negative for abdominal pain, blood in stool, constipation, diarrhea, nausea and vomiting.   Endocrine: Negative for cold intolerance and heat intolerance.    Genitourinary:  Negative for difficulty urinating, dysuria, frequency, hematuria and urinary incontinence.   Musculoskeletal:  Negative for arthralgias, back pain and myalgias.   Skin:  Negative for rash, skin lesions and wound.   Neurological:  Negative for dizziness, seizures, weakness, numbness and headache.   Hematological:  Does not bruise/bleed easily.   Psychiatric/Behavioral:  Negative for depressed mood. The patient is not nervous/anxious.      Current Outpatient Medications on File Prior to Visit   Medication Sig Dispense Refill    cetirizine (zyrTEC) 10 MG tablet Take 1 tablet by mouth Daily.      imatinib (Gleevec) 400 MG chemo tablet Take 1 tablet by mouth once daily (Patient taking differently: Take 1 tablet by mouth Daily Before Supper.) 30 tablet 11    [DISCONTINUED] ondansetron (ZOFRAN) 4 MG tablet TAKE 2 TABLETS BY MOUTH EVERY 8 HOURS AS NEEDED FOR NAUSEA OR VOMITING 30 tablet 3    HYDROcodone-acetaminophen (Norco) 7.5-325 MG per tablet Take 1 tablet by mouth Every 4 (Four) Hours As Needed for Moderate Pain. (Patient not taking: Reported on 1/11/2024) 12 tablet 0    [DISCONTINUED] Norethin-Eth Estrad-Fe Biphas (Lo Loestrin Fe) 1 MG-10 MCG / 10 MCG tablet Take 1 tablet by mouth Daily. (Patient taking differently: Take 1 tablet by mouth Every Night.) 84 tablet 3     No current facility-administered medications on file prior to visit.       Allergies   Allergen Reactions    Nickel Rash     Past Medical History:   Diagnosis Date    Abnormal Pap smear of cervix     Anxiety     CML (chronic myelocytic leukemia)     2+ years ago, DAILY THERAPY WITH NO CURRENT ISSUES    CTS (carpal tunnel syndrome)     Seasonal allergies      Past Surgical History:   Procedure Laterality Date    APPENDECTOMY  06/30/2012    BREAST BIOPSY Bilateral     BREAST LUMPECTOMY Left 04/14/2023    Procedure: Excision of left breast mass;  Surgeon: Erma Quiros MD;  [place] [facility] [place] [facility] [place]  Service: General;  Laterality:  "Left;    CARPAL TUNNEL RELEASE Right 12/14/2023    Procedure: CARPAL TUNNEL RELEASE;  Surgeon: Eric Devine MD;  Location: [facility];  Service: Orthopedics;  Laterality: Right;    COLONOSCOPY  2015    ENDOSCOPY  2015     Social History     Socioeconomic History    Marital status:    Tobacco Use    Smoking status: Never    Smokeless tobacco: Never   Vaping Use    Vaping Use: Never used   Substance and Sexual Activity    Alcohol use: Not Currently     Comment: Current some day    Drug use: Never    Sexual activity: Defer     Partners: Male     Birth control/protection: Pill, OCP     Family History   Problem Relation Age of Onset    Breast cancer Mother 42    Cancer Mother     Breast cancer Maternal Grandmother 50    Ovarian cancer Maternal Grandmother     Lung cancer Paternal Grandmother     Uterine cancer Neg Hx     Prostate cancer Neg Hx     Colon cancer Neg Hx     Malig Hyperthermia Neg Hx        Objective   Physical Exam  Vitals reviewed. Exam conducted with a chaperone present.   Constitutional:       General: She is not in acute distress.     Appearance: Normal appearance.   Cardiovascular:      Rate and Rhythm: Normal rate and regular rhythm.      Heart sounds: Normal heart sounds. No murmur heard.     No gallop.   Pulmonary:      Effort: Pulmonary effort is normal.      Breath sounds: Normal breath sounds.   Abdominal:      General: Abdomen is flat. Bowel sounds are normal.      Palpations: Abdomen is soft.   Musculoskeletal:      Right lower leg: No edema.      Left lower leg: No edema.   Neurological:      Mental Status: She is alert.   Psychiatric:         Mood and Affect: Mood normal.         Behavior: Behavior normal.         Vitals:    01/11/24 1546   BP: 117/77   Pulse: 81   Resp: 16   Temp: 98.2 °F (36.8 °C)   SpO2: 100%   Weight: 54.9 kg (121 lb 0.5 oz)   Height: 152.4 cm (60\")   PainSc: 0-No pain     ECOG score: 0         PHQ-9 Total Score:                    Result Review :   The " "following data was reviewed by: Sachin Walton MD on 01/11/2024:  Lab Results   Component Value Date    HGB 13.3 12/28/2023    HCT 40.6 12/28/2023    MCV 94.9 12/28/2023     12/28/2023    WBC 4.10 12/28/2023    NEUTROABS 2.09 12/28/2023    LYMPHSABS 1.75 12/28/2023    MONOSABS 0.20 12/28/2023    EOSABS 0.04 12/28/2023    BASOSABS 0.01 12/28/2023     Lab Results   Component Value Date    GLUCOSE 117 (H) 12/28/2023    BUN 8 12/28/2023    CREATININE 0.90 12/28/2023     12/28/2023    K 3.9 12/28/2023     12/28/2023    CO2 22.9 12/28/2023    CALCIUM 8.8 12/28/2023    PROTEINTOT 6.5 12/28/2023    ALBUMIN 4.4 12/28/2023    BILITOT 0.7 12/28/2023    ALKPHOS 48 12/28/2023    AST 14 12/28/2023    ALT 10 12/28/2023     Lab Results   Component Value Date    FREET4 1.38 09/20/2023    TSH 1.040 09/20/2023     No results found for: \"IRON\", \"LABIRON\", \"TRANSFERRIN\", \"TIBC\"  Lab Results   Component Value Date     07/26/2019    MIANTHHH09 569 11/11/2022    FOLATE 12.40 11/11/2022     No results found for: \"PSA\", \"CEA\", \"AFP\", \"\", \"\"  PCR for BCR/ABL 0.0579          Assessment and Plan    Diagnoses and all orders for this visit:    1. CML (chronic myelocytic leukemia) [C92.10] (Primary)  Assessment & Plan:  Patient's PCR for BCR/ABL demonstrates major molecular response.  Normal CBC and CMP other than slight elevation in blood sugar but this was nonfasting specimen.  She reports occasional nausea from her medicine but Zofran is effective.  Refill of Zofran provided today.  We again discussed switching to a second generation TKI but patient feels like she is tolerating her Gleevec well and her lab work has been quite stable for many years.  She will continue Gleevac as prescribed.  I will see her back in 6 months for continued surveillance with lab work prior including PCR for BCR/ABL.    Orders:  -     CBC & Differential; Future  -     Comprehensive Metabolic Panel; Future  -     BCR-ABL1, CML / " ALL, PCR, Quant; Future    2. Nausea  -     ondansetron (ZOFRAN) 4 MG tablet; Take 1 tablet by mouth Every 8 (Eight) Hours As Needed for Nausea or Vomiting.  Dispense: 30 tablet; Refill: 3            Patient Follow Up: 6 m    Patient was given instructions and counseling regarding her condition or for health maintenance advice. Please see specific information pulled into the AVS if appropriate.     Sachin Walton MD    1/11/2024

## 2024-01-11 NOTE — ASSESSMENT & PLAN NOTE
Patient's PCR for BCR/ABL demonstrates major molecular response.  Normal CBC and CMP other than slight elevation in blood sugar but this was nonfasting specimen.  She reports occasional nausea from her medicine but Zofran is effective.  Refill of Zofran provided today.  We again discussed switching to a second generation TKI but patient feels like she is tolerating her Gleevec well and her lab work has been quite stable for many years.  She will continue Gleevac as prescribed.  I will see her back in 6 months for continued surveillance with lab work prior including PCR for BCR/ABL.

## 2024-01-12 ENCOUNTER — SPECIALTY PHARMACY (OUTPATIENT)
Dept: PHARMACY | Facility: HOSPITAL | Age: 39
End: 2024-01-12
Payer: COMMERCIAL

## 2024-05-15 ENCOUNTER — TELEPHONE (OUTPATIENT)
Dept: ONCOLOGY | Facility: HOSPITAL | Age: 39
End: 2024-05-15
Payer: COMMERCIAL

## 2024-05-15 NOTE — TELEPHONE ENCOUNTER
LEFT PT DETAILED VM TO CALL ME BACK TO RESCHEDULE APPTS. THE DATES REQUESTED ARE ON FRIDAYS AND DR. SHARP IS OUT OF THE OFFICE ON FRIDAY'S.

## 2024-05-15 NOTE — TELEPHONE ENCOUNTER
Caller: Sailaja Mercer    Relationship to patient: Self    Best call back number:     [phone]     Chief complaint: PT NEEDS DIFFERENT APPT DATE    Type of visit: FOLLOW UP    Requested date: 07/05, 07/12, 07/19 (MORNING APPT)    If rescheduling, when is the original appointment: 07/11/24

## 2024-06-01 DIAGNOSIS — Z30.09 BIRTH CONTROL COUNSELING: ICD-10-CM

## 2024-06-03 RX ORDER — NORETHINDRONE ACETATE AND ETHINYL ESTRADIOL, ETHINYL ESTRADIOL AND FERROUS FUMARATE 1MG-10(24)
1 KIT ORAL DAILY
Qty: 84 TABLET | Refills: 0 | Status: SHIPPED | OUTPATIENT
Start: 2024-06-03

## 2024-06-04 ENCOUNTER — TELEPHONE (OUTPATIENT)
Dept: OBSTETRICS AND GYNECOLOGY | Facility: CLINIC | Age: 39
End: 2024-06-04
Payer: COMMERCIAL

## 2024-06-04 NOTE — TELEPHONE ENCOUNTER
Patient called having trouble with insurance.  Pharmacy telling her it's too soon to fill..  Samples of Lo Loestrin Fe x 3 given to patient   unknown

## 2024-06-17 NOTE — PROGRESS NOTES
Chief Complaint   Patient presents with    Annual Exam     HPI:   39 y.o. . Presents for well woman exam.  Contraception: ocp- for menstrual regulation , last year experienced period of frequent menstruation, pelvic ultrasound demonstrated a questionable small uterine polpy. Bleeding has normalized  Menses:   q 3 months, lasts 3 days,   light tampon contains    Pain:  None  Last pap: normal IGP,rfx Aptima HPV All Pth (2022 14:49)  Complaints:   Family hx breast cancer, hx benign breast masses- fibrosis. Recent palpable right breast mass x 1 month, nontender, no skin changes, nipple discharge, or breast swelling, no breast trauma  Patient reports that she is not currently experiencing any symptoms of urinary incontinence.       Past Medical History:   Diagnosis Date    Abnormal Pap smear of cervix     Anxiety     CML (chronic myelocytic leukemia)     2+ years ago, DAILY THERAPY WITH NO CURRENT ISSUES    CTS (carpal tunnel syndrome)     Seasonal allergies       Past Surgical History:   Procedure Laterality Date    APPENDECTOMY  2012    BREAST BIOPSY Bilateral     BREAST LUMPECTOMY Left 2023    Procedure: Excision of left breast mass;  Surgeon: Erma Quiros MD;  Location: [facility];  Service: General;  Laterality: Left;    CARPAL TUNNEL RELEASE Right 2023    Procedure: CARPAL TUNNEL RELEASE;  Surgeon: Eric Devine MD;  Location: [facility];  Service: Orthopedics;  Laterality: Right;    COLONOSCOPY      ENDOSCOPY        Family History   Problem Relation Age of Onset    Breast cancer Mother 42    Cancer Mother     Breast cancer Maternal Grandmother 50    Ovarian cancer Maternal Grandmother     Lung cancer Paternal Grandmother     Uterine cancer Neg Hx     Prostate cancer Neg Hx     Colon cancer Neg Hx     Malig Hyperthermia Neg Hx      Allergies as of 2024 - Reviewed 2024   Allergen Reaction Noted    Nickel Rash 04/10/2023        PCP: does manage PMHx  "and preventative labs    /77   Pulse 91   Ht 152.4 cm (60\")   Wt 54.4 kg (120 lb)   LMP  (LMP Unknown)   BMI 23.44 kg/m²     PHYSICAL EXAM: Chaperone present   General- NAD, alert and oriented, appropriate  Psych- Normal mood, good memory  Neck- No masses, no thyroid enlargement  Lymphatic- No palpable neck, axillary, or groin nodes  CV- Regular rhythm, no murmurs  Resp- CTA to bases, no wheezes  Abdomen- Soft, non distended, non tender, no masses  Breast left-  Bilaterally symmetrical, no masses, non tender, no nipple discharge  Breast right- Bilaterally symmetrical, 11 o'clock, 2 cm from areolar border is a circumscribed 0.4 mm soft mass, non tender, no nipple discharge  External genitalia- Normal female, no lesions  Urethra/meatus- Normal, no masses, non tender, no prolapse  Bladder- Normal, no masses, non tender, no prolapse  Vagina- Normal, no atrophy, no lesions, no discharge, no prolapse  Cvx- Normal, no lesions, no discharge, No cervical motion tenderness  Uterus- Normal size, shape & consistency.  Non tender, mobile.  Adnexa- No mass, non tender  Anus/Rectum/Perineum- Not performed  Ext- No edema, no cyanosis    Skin- No lesions, no rashes, no acanthosis nigricans       ASSESSMENT and PLAN:    Diagnoses and all orders for this visit:    1. Well woman exam (Primary)    2. At high risk for breast cancer  -     MRI Breast Bilateral Diagnostic W WO Contrast; Future    3. Uterine polyp  -     US Non-ob Transvaginal; Future    4. Breast mass in female  -     Mammo Diagnostic Digital Tomosynthesis Bilateral With CAD; Future  -     US Breast Right Limited; Future        Preventative:   BREAST HEALTH- Monthly self breast exam importance and how to reviewed. MMG and/or MRI (prn) reviewed per society guidelines and her individual history. Screening Imaging: Updated today  CERVICAL CANCER Screening- Reviewed current ASCCP guidelines for screening w and wo cotest HPV, age specific.  Screen: Already up to " date  COLON CANCER Screening- Reviewed current medical society guidelines and options.  Screen:  Not medically needed  SEXUAL HEALTH: Declines STD screening  BONE HEALTH- Reviewed current medical society guidelines and options for testing, calcium and vit D intake.  Weight bearing exercise.  DEXA: Not medically needed  VACCINATIONS Recommended: COVID and booster PRN, Flu annually  Importance discussed, risk being unvaccinated reviewed.  Questions answered  Genetic testing: Previously screened increased risk  Smoking status- NON SMOKER  Follow up PCP/Specialist PMHx and Labs  TRACK MENSES, RTO if <q21d, >7d long, heavy or painful.    All BIRTH CONTROL options R/B/A/SE/E of each reviewed in detail.  OCP/hormone use risk THROMBOEMBOLIC RISK reviewed.     SAFE SEX/condoms importance reviewed.    Considering surgical management for permanent sterilization and endometrial ablation due to family hx of breast cancer and recommendation to discontinue use of hormone contraceptives due to hx increased risk for breast cancer   She understands the importance of having any ordered tests to be performed in a timely fashion.  The risks of not performing them include, but are not limited to, advanced cancer stages, bone loss from osteoporosis and/or subsequent increase in morbidity and/or mortality.  She is encouraged to review her results online and/or contact or office if she has questions.     Follow Up:  Return in about 1 year (around 6/18/2025) for will call with results of ultrasound and breast imaging.        Alysha Lxu, APRN  06/18/2024

## 2024-06-18 ENCOUNTER — OFFICE VISIT (OUTPATIENT)
Dept: OBSTETRICS AND GYNECOLOGY | Facility: CLINIC | Age: 39
End: 2024-06-18
Payer: COMMERCIAL

## 2024-06-18 VITALS
DIASTOLIC BLOOD PRESSURE: 77 MMHG | HEIGHT: 60 IN | SYSTOLIC BLOOD PRESSURE: 120 MMHG | BODY MASS INDEX: 23.56 KG/M2 | WEIGHT: 120 LBS | HEART RATE: 91 BPM

## 2024-06-18 DIAGNOSIS — N84.0 UTERINE POLYP: ICD-10-CM

## 2024-06-18 DIAGNOSIS — Z01.419 WELL WOMAN EXAM: Primary | ICD-10-CM

## 2024-06-18 DIAGNOSIS — N63.0 BREAST MASS IN FEMALE: ICD-10-CM

## 2024-06-18 DIAGNOSIS — Z91.89 AT HIGH RISK FOR BREAST CANCER: ICD-10-CM

## 2024-06-18 PROCEDURE — 99213 OFFICE O/P EST LOW 20 MIN: CPT | Performed by: NURSE PRACTITIONER

## 2024-06-18 PROCEDURE — 99395 PREV VISIT EST AGE 18-39: CPT | Performed by: NURSE PRACTITIONER

## 2024-06-18 PROCEDURE — 99459 PELVIC EXAMINATION: CPT | Performed by: NURSE PRACTITIONER

## 2024-06-18 RX ORDER — AMOXICILLIN AND CLAVULANATE POTASSIUM 875; 125 MG/1; MG/1
TABLET, FILM COATED ORAL
COMMUNITY
Start: 2024-06-07 | End: 2024-06-23

## 2024-06-18 RX ORDER — METHYLPREDNISOLONE 4 MG/1
TABLET ORAL
COMMUNITY
Start: 2024-06-07 | End: 2024-06-23

## 2024-06-23 ENCOUNTER — HOSPITAL ENCOUNTER (EMERGENCY)
Facility: HOSPITAL | Age: 39
Discharge: HOME | End: 2024-06-23
Attending: EMERGENCY MEDICINE | Admitting: EMERGENCY MEDICINE
Payer: COMMERCIAL

## 2024-06-23 ENCOUNTER — APPOINTMENT (OUTPATIENT)
Dept: CT IMAGING | Facility: HOSPITAL | Age: 39
End: 2024-06-23
Payer: COMMERCIAL

## 2024-06-23 VITALS
HEIGHT: 60 IN | WEIGHT: 121.25 LBS | RESPIRATION RATE: 16 BRPM | SYSTOLIC BLOOD PRESSURE: 149 MMHG | HEART RATE: 117 BPM | OXYGEN SATURATION: 100 % | BODY MASS INDEX: 23.81 KG/M2 | TEMPERATURE: 98.2 F | DIASTOLIC BLOOD PRESSURE: 89 MMHG

## 2024-06-23 DIAGNOSIS — J32.9 CHRONIC SINUSITIS, UNSPECIFIED LOCATION: Primary | ICD-10-CM

## 2024-06-23 LAB
FLUAV SUBTYP SPEC NAA+PROBE: NOT DETECTED
FLUBV RNA ISLT QL NAA+PROBE: NOT DETECTED
RSV RNA NPH QL NAA+NON-PROBE: NOT DETECTED
SARS-COV-2 RNA RESP QL NAA+PROBE: NOT DETECTED

## 2024-06-23 PROCEDURE — 25010000002 KETOROLAC TROMETHAMINE PER 15 MG

## 2024-06-23 PROCEDURE — 87637 SARSCOV2&INF A&B&RSV AMP PRB: CPT | Performed by: EMERGENCY MEDICINE

## 2024-06-23 PROCEDURE — 70486 CT MAXILLOFACIAL W/O DYE: CPT

## 2024-06-23 PROCEDURE — 96372 THER/PROPH/DIAG INJ SC/IM: CPT

## 2024-06-23 PROCEDURE — 99284 EMERGENCY DEPT VISIT MOD MDM: CPT

## 2024-06-23 RX ORDER — KETOROLAC TROMETHAMINE 30 MG/ML
30 INJECTION, SOLUTION INTRAMUSCULAR; INTRAVENOUS ONCE
Status: COMPLETED | OUTPATIENT
Start: 2024-06-23 | End: 2024-06-23

## 2024-06-23 RX ORDER — PREDNISONE 20 MG/1
TABLET ORAL
COMMUNITY
Start: 2024-06-14

## 2024-06-23 RX ADMIN — KETOROLAC TROMETHAMINE 30 MG: 30 INJECTION, SOLUTION INTRAMUSCULAR; INTRAVENOUS at 17:41

## 2024-06-23 NOTE — ED PROVIDER NOTES
"Time: 5:18 PM EDT  Date of encounter:  6/23/2024  Independent Historian/Clinical History and Information was obtained by:   Patient    History is limited by: N/A    Chief Complaint   Patient presents with    Sinusitis         History of Present Illness:  Patient is a 39 y.o. year old female who presents to the emergency department for evaluation of sinus pressure.  Patient reports she has been treated by her her PCP for chronic sinusitis.  She is currently taking Augmentin and steroids.  Recently she states that she has had an increase in pain and pressure with associated dizziness and dry cough.  These are all the symptoms that she has been experiencin but they seem to be getting worse.  Patient denies fever or chills, she states she thinks her vision is \"weird\", it is not blurred but she is having difficulty focusing.    Patient Care Team  Primary Care Provider: Yamila Dubois APRN    Past Medical History:     Allergies   Allergen Reactions    Nickel Rash     Past Medical History:   Diagnosis Date    Abnormal Pap smear of cervix     Anxiety     CML (chronic myelocytic leukemia)     2+ years ago, DAILY THERAPY WITH NO CURRENT ISSUES    CTS (carpal tunnel syndrome)     Seasonal allergies      Past Surgical History:   Procedure Laterality Date    APPENDECTOMY  06/30/2012    BREAST BIOPSY Bilateral     BREAST LUMPECTOMY Left 04/14/2023    Procedure: Excision of left breast mass;  Surgeon: Erma Quiros MD;  Location: [facility] [place] [facility];  Service: General;  Laterality: Left;    CARPAL TUNNEL RELEASE Right 12/14/2023    Procedure: CARPAL TUNNEL RELEASE;  Surgeon: Eric Devine MD;  Location: [facility];  Service: Orthopedics;  Laterality: Right;    COLONOSCOPY  2015    ENDOSCOPY  2015     Family History   Problem Relation Age of Onset    Breast cancer Mother 42    Cancer Mother     Breast cancer Maternal Grandmother 50    Ovarian cancer Maternal Grandmother     Lung cancer Paternal Grandmother     Uterine " cancer Neg Hx     Prostate cancer Neg Hx     Colon cancer Neg Hx     Malig Hyperthermia Neg Hx        Home Medications:  Prior to Admission medications    Medication Sig Start Date End Date Taking? Authorizing Provider   predniSONE (DELTASONE) 20 MG tablet TAKE 1 TABLET BY MOUTH TWICE DAILY FOR 5 DAYS 1 TABLET EVERY DAY FOR 5 DAYS ONE-HALF TABLET EVERY DAY FOR 5 DAYS 6/14/24  Yes Dolly Leone MD   cetirizine (zyrTEC) 10 MG tablet Take 1 tablet by mouth Daily.    Dolly Leone MD   imatinib (Gleevec) 400 MG chemo tablet Take 1 tablet by mouth once daily  Patient taking differently: Take 1 tablet by mouth Daily Before Supper. 10/27/23   Sachin Walton MD   Lo Loestrin Fe 1 MG-10 MCG / 10 MCG tablet TAKE 1 TABLET BY MOUTH DAILY 6/3/24   Alysha Lux APRN   ondansetron (ZOFRAN) 4 MG tablet Take 1 tablet by mouth Every 8 (Eight) Hours As Needed for Nausea or Vomiting. 1/11/24   Sachin Walton MD   amoxicillin-clavulanate (AUGMENTIN) 875-125 MG per tablet TAKE 1 TABLET BY MOUTH TWICE DAILY WITH FOOD FOR 10 DAYS 6/7/24 6/23/24  Dolly Leone MD   methylPREDNISolone (MEDROL) 4 MG dose pack follow package directions 6/7/24 6/23/24  Dolly Leone MD        Social History:   Social History     Tobacco Use    Smoking status: Never    Smokeless tobacco: Never   Vaping Use    Vaping status: Never Used   Substance Use Topics    Alcohol use: Not Currently     Comment: Current some day    Drug use: Never         Review of Systems:  Review of Systems   Constitutional: Negative.    HENT:  Positive for sinus pressure and sinus pain.    Eyes: Negative.    Respiratory:  Positive for cough.    Cardiovascular: Negative.    Gastrointestinal: Negative.    Endocrine: Negative.    Genitourinary: Negative.    Musculoskeletal: Negative.    Skin: Negative.    Allergic/Immunologic: Negative.    Neurological:  Positive for dizziness and headaches.   Hematological: Negative.    Psychiatric/Behavioral:  "Negative.          Physical Exam:  /89   Pulse 117   Temp 98.2 °F (36.8 °C) (Oral)   Resp 16   Ht 152.4 cm (60\")   Wt 55 kg (121 lb 4.1 oz)   LMP  (LMP Unknown)   SpO2 100%   BMI 23.68 kg/m²         Physical Exam  Vitals and nursing note reviewed.   Constitutional:       General: She is not in acute distress.     Appearance: Normal appearance. She is not toxic-appearing.   HENT:      Head: Normocephalic and atraumatic.      Jaw: There is normal jaw occlusion.      Comments: Pain and tenderness to palpation of frontal and maxillary sinuses.     Right Ear: Tympanic membrane, ear canal and external ear normal.      Left Ear: Tympanic membrane, ear canal and external ear normal.      Nose: Nose normal.      Mouth/Throat:      Mouth: Mucous membranes are moist.   Eyes:      General: Lids are normal.      Extraocular Movements: Extraocular movements intact.      Conjunctiva/sclera: Conjunctivae normal.      Pupils: Pupils are equal, round, and reactive to light.   Cardiovascular:      Rate and Rhythm: Normal rate and regular rhythm.      Pulses: Normal pulses.      Heart sounds: Normal heart sounds.   Pulmonary:      Effort: Pulmonary effort is normal. No respiratory distress.      Breath sounds: Normal breath sounds. No wheezing or rhonchi.   Abdominal:      General: Abdomen is flat.      Palpations: Abdomen is soft.      Tenderness: There is no abdominal tenderness. There is no guarding or rebound.   Musculoskeletal:         General: Normal range of motion.      Cervical back: Normal range of motion and neck supple.      Right lower leg: No edema.      Left lower leg: No edema.   Skin:     General: Skin is warm and dry.   Neurological:      Mental Status: She is alert and oriented to person, place, and time. Mental status is at baseline.   Psychiatric:         Mood and Affect: Mood normal.                    Procedures:  Procedures      Medical Decision Making:      Comorbidities that affect care:    CML, " seasonal allergies, anxiety    External Notes reviewed:    Previous Clinic Note: Patient was last seen on 6/18/2024 by MYRON Kamara for a well woman visit      The following orders were placed and all results were independently analyzed by me:  Orders Placed This Encounter   Procedures    COVID-19, FLU A/B, RSV PCR 1 HR TAT - Swab, Nasopharynx    CT Sinus Without Contrast       Medications Given in the Emergency Department:  Medications   ketorolac (TORADOL) injection 30 mg (30 mg Intramuscular Given 6/23/24 1741)        ED Course:    The patient was initially evaluated in the triage area where orders were placed. The patient was later dispositioned by MYRON Chen.      The patient was advised to stay for completion of workup which includes but is not limited to communication of labs and radiological results, reassessment and plan. The patient was advised that leaving prior to disposition by a provider could result in critical findings that are not communicated to the patient.          Labs:    Lab Results (last 24 hours)       Procedure Component Value Units Date/Time    COVID-19, FLU A/B, RSV PCR 1 HR TAT - Swab, Nasopharynx [[id number]]  (Normal) Collected: 06/23/24 1600    Specimen: Swab from Nasopharynx Updated: 06/23/24 1703     COVID19 Not Detected     Influenza A PCR Not Detected     Influenza B PCR Not Detected     RSV, PCR Not Detected    Narrative:      Fact sheet for providers: https://www.fda.gov/media/473973/download    Fact sheet for patients: https://www.fda.gov/media/280880/download    Test performed by PCR.             Imaging:    CT Sinus Without Contrast    Result Date: 6/23/2024  CT SINUS WO CONTRAST Date of Exam: 6/23/2024 5:27 PM EDT Indication: Chronic sinusitis with increased pressure; failed outpatient therapy; hx of CML. Comparison: CT scan of the head 4/19/2022 Technique: Axial CT images were obtained from the inferior aspect of the mandible through the frontal sinuses  without contrast administration.  Reconstructed coronal and sagittal images were also obtained. Automated exposure control and iterative construction methods were used. Findings: 1.5 cm x 1 cm polyp in the left maxillary antrum is of doubtful clinical significance. The maxillary antra are well aerated. No air-fluid levels are seen. The frontal sinuses and ethmoid air cells are well aerated. The right sphenoid sinus is small, and contains foamy material consistent with chronic sinusitis. This is unchanged. The left sphenoid sinus is well aerated. The middle ears and mastoid air cells are well aerated. No intracranial abnormality is seen.     Impression: CT scan of the paranasal sinuses demonstrating no significant change in comparison to 4/19/2022. Electronically Signed: Dwayne Kelley MD  6/23/2024 5:54 PM EDT  Workstation ID: [id number]       Differential Diagnosis and Discussion:      Facial Pain/Swelling: Differential diagnosis includes but is not limited to temporal arteritis, intracranial tumors, neuralgias, dental disease, ocular disease, TMJ syndrome, salivary gland disorders, sinusitis, cluster headaches, migraines, and psychogenic.    All labs were reviewed and interpreted by me.  CT scan radiology impression was interpreted by me.    MDM  Number of Diagnoses or Management Options  Chronic sinusitis, unspecified location  Diagnosis management comments: Patient presents to the emergency department with complaints of chronic sinusitis.  Patient states she has been treated by her family physician but pain and symptoms have worsened.  She is currently taking Augmentin and prednisone as prescribed from her physician.  No further antibiotic treatment was warranted with workup.  Workup did not reveal any acute findings.  Viral swabs were negative.  Vital signs were stable.  Patient was afebrile while in the emergency department.  Patient is encouraged to follow back up with primary care provider to continue plan for  chronic management of her sinusitis.  Patient is agreeable to plan for discharge and verbalizes instructions.       Amount and/or Complexity of Data Reviewed  Clinical lab tests: reviewed        Patient Care Considerations:    SEPSIS was considered but is NOT present in the emergency department as SIRS criteria is not present. ANTIBIOTICS: I considered prescribing antibiotics as an outpatient however no bacterial focus of infection was found.      Consultants/Shared Management Plan:    None    Social Determinants of Health:    Patient is independent, reliable, and has access to care.       Disposition and Care Coordination:    Discharged: The patient is suitable and stable for discharge with no need for consideration of admission.    I have explained the patient´s condition, diagnoses and treatment plan based on the information available to me at this time. I have answered questions and addressed any concerns. The patient has a good  understanding of the patient´s diagnosis, condition, and treatment plan as can be expected at this point. The vital signs have been stable. The patient´s condition is stable and appropriate for discharge from the emergency department.      The patient will pursue further outpatient evaluation with the primary care physician or other designated or consulting physician as outlined in the discharge instructions. They are agreeable to this plan of care and follow-up instructions have been explained in detail. The patient has received these instructions in written format and has expressed an understanding of the discharge instructions. The patient is aware that any significant change in condition or worsening of symptoms should prompt an immediate return to this or the closest emergency department or call to 911.    Final diagnoses:   Chronic sinusitis, unspecified location        ED Disposition       ED Disposition   Discharge    Condition   Stable    Comment   --               This medical  record created using voice recognition software.             Susan Galvez, APRN  06/23/24 4719

## 2024-06-26 NOTE — PROGRESS NOTES
GYN Visit    Chief Complaint   Patient presents with    Consult     Discuss sterilization and ablation       HPI:   39 y.o. using lo Loestrin here to discuss possible sterilization and ablation.  The patient states that she has been diagnosed with chronic myelocytic leukemia and she is on a chemotherapy medication.  She will continue to take this medication for the remainder of her life.  Patient states that time she is not interested in having anymore children.  She states that she has a very strong family history of breast cancer in her mother and grandmother.  She has undergone genetic testing and this has been negative.  The patient states her mother did have genetic testing many years ago when she was first diagnosed with her cancer.  The patient states that there is also ovarian and uterine cancer in her family.  Patient is concerned about the use of estrogen products with her family history of breast cancer.      History: PMHx, Meds, Allergies, PSHx, Social Hx, and POBHx all reviewed and updated.    PHYSICAL EXAM:  /75   Pulse 88   Wt 53.1 kg (117 lb)   LMP 2024 (Approximate) Comment: menses q 3 months  Breastfeeding No   BMI 22.85 kg/m²   General- NAD, alert and oriented, appropriate  Psych- Normal mood, good memory        ASSESSMENT AND PLAN:  Diagnoses and all orders for this visit:    1. Family history of breast cancer (Primary)    2. CML (chronic myelocytic leukemia)    Discussed with patient the risk associated with use of estrogen containing products.  With her family history of informed her that since her mother is still alive they might consider having genetic testing redone as I am sure they testing today test more genes that and I was done many years ago to determine if indeed they do have a genetic predisposition to development of breast cancer.  I recommended to the patient that due to her age I would not recommend endometrial ablation as it will not prevent her from  bleeding until she develops menopause and if she has a failure of the procedure she is left with needing hysterectomy.  I also told the patient that with the endometrial ablation we do distort the appearance of the uterus and that she would not be a candidate for having repeat ablation performed and we may miss early endometrial cancer.  I do agree that she should consider possibly using another form of birth control that does not contain estrogen due to possible increasing her risk for development of breast cancer and I would think using a progesterone agent such as with a IUD might be of better recommendation.  The IUDs can stay in place for 8 years and then that would take her to age 47 and she probably would need 1 more additional IUD which would take her into menopause.  The IUD would also probably help her to develop hypomenorrhea or amenorrhea.  The IUD would provide her with both cycle control as well as birth control.  The patient was interested in trying the Mirena IUD so we will going to pre-CERT her insurance for the IUD and she will come back for placement.  The patient was given opportunity ask questions and her questions were answered to her satisfaction.        Follow Up:  Return for [facility] precert for mirena iud and bhcg prior to insertion.          Ernestine Mg,   06/27/2024    [facility]  [facility] [place] [address]  Dept: [phone]  Dept Fax: [phone]  Loc: [phone]  Loc Fax: [phone]

## 2024-06-27 ENCOUNTER — TELEPHONE (OUTPATIENT)
Dept: OBSTETRICS AND GYNECOLOGY | Facility: CLINIC | Age: 39
End: 2024-06-27
Payer: COMMERCIAL

## 2024-06-27 ENCOUNTER — OFFICE VISIT (OUTPATIENT)
Dept: OBSTETRICS AND GYNECOLOGY | Facility: CLINIC | Age: 39
End: 2024-06-27
Payer: COMMERCIAL

## 2024-06-27 VITALS
BODY MASS INDEX: 22.85 KG/M2 | SYSTOLIC BLOOD PRESSURE: 115 MMHG | DIASTOLIC BLOOD PRESSURE: 75 MMHG | HEART RATE: 88 BPM | WEIGHT: 117 LBS

## 2024-06-27 DIAGNOSIS — C92.10 CML (CHRONIC MYELOCYTIC LEUKEMIA): ICD-10-CM

## 2024-06-27 DIAGNOSIS — Z80.3 FAMILY HISTORY OF BREAST CANCER: Primary | ICD-10-CM

## 2024-06-27 PROCEDURE — 99213 OFFICE O/P EST LOW 20 MIN: CPT | Performed by: OBSTETRICS & GYNECOLOGY

## 2024-06-27 RX ORDER — PREDNISOLONE ACETATE 10 MG/ML
SUSPENSION/ DROPS OPHTHALMIC
COMMUNITY
Start: 2024-06-25

## 2024-06-27 RX ORDER — MECLIZINE HYDROCHLORIDE 25 MG/1
TABLET ORAL
COMMUNITY
Start: 2024-06-07

## 2024-06-27 NOTE — TELEPHONE ENCOUNTER
[id number]: pt will call to schedule Mirena insert, no need to check benes. Covered 100% our stock, beta day before.

## 2024-07-04 NOTE — DISCHARGE INSTRUCTIONS
Home to rest.  Take medications as previously prescribed.  You may take Tylenol and ibuprofen for pain relief.    Call your primary care provider to schedule an appointment for follow-up and reevaluation.    If symptoms worsen, change presentation, or you develop new symptoms please return to the ED for further evaluation.   History/Exam/Medical Decision Making

## 2024-07-08 ENCOUNTER — LAB (OUTPATIENT)
Dept: LAB | Facility: HOSPITAL | Age: 39
End: 2024-07-08
Payer: COMMERCIAL

## 2024-07-08 DIAGNOSIS — C92.10 CML (CHRONIC MYELOCYTIC LEUKEMIA): ICD-10-CM

## 2024-07-08 LAB
ALBUMIN SERPL-MCNC: 4.2 G/DL (ref 3.5–5.2)
ALBUMIN/GLOB SERPL: 2.2 G/DL
ALP SERPL-CCNC: 49 U/L (ref 39–117)
ALT SERPL W P-5'-P-CCNC: 12 U/L (ref 1–33)
ANION GAP SERPL CALCULATED.3IONS-SCNC: 10.1 MMOL/L (ref 5–15)
AST SERPL-CCNC: 15 U/L (ref 1–32)
BASOPHILS # BLD AUTO: 0.01 10*3/MM3 (ref 0–0.2)
BASOPHILS NFR BLD AUTO: 0.1 % (ref 0–1.5)
BILIRUB SERPL-MCNC: 0.6 MG/DL (ref 0–1.2)
BUN SERPL-MCNC: 14 MG/DL (ref 6–20)
BUN/CREAT SERPL: 16.7 (ref 7–25)
CALCIUM SPEC-SCNC: 8.8 MG/DL (ref 8.6–10.5)
CHLORIDE SERPL-SCNC: 103 MMOL/L (ref 98–107)
CO2 SERPL-SCNC: 25.9 MMOL/L (ref 22–29)
CREAT SERPL-MCNC: 0.84 MG/DL (ref 0.57–1)
DEPRECATED RDW RBC AUTO: 45.7 FL (ref 37–54)
EGFRCR SERPLBLD CKD-EPI 2021: 91.3 ML/MIN/1.73
EOSINOPHIL # BLD AUTO: 0.04 10*3/MM3 (ref 0–0.4)
EOSINOPHIL NFR BLD AUTO: 0.5 % (ref 0.3–6.2)
ERYTHROCYTE [DISTWIDTH] IN BLOOD BY AUTOMATED COUNT: 12.7 % (ref 12.3–15.4)
GLOBULIN UR ELPH-MCNC: 1.9 GM/DL
GLUCOSE SERPL-MCNC: 76 MG/DL (ref 65–99)
HCT VFR BLD AUTO: 39.7 % (ref 34–46.6)
HGB BLD-MCNC: 13.7 G/DL (ref 12–15.9)
IMM GRANULOCYTES # BLD AUTO: 0.03 10*3/MM3 (ref 0–0.05)
IMM GRANULOCYTES NFR BLD AUTO: 0.3 % (ref 0–0.5)
LYMPHOCYTES # BLD AUTO: 2.9 10*3/MM3 (ref 0.7–3.1)
LYMPHOCYTES NFR BLD AUTO: 33.1 % (ref 19.6–45.3)
MCH RBC QN AUTO: 34.3 PG (ref 26.6–33)
MCHC RBC AUTO-ENTMCNC: 34.5 G/DL (ref 31.5–35.7)
MCV RBC AUTO: 99.3 FL (ref 79–97)
MONOCYTES # BLD AUTO: 0.53 10*3/MM3 (ref 0.1–0.9)
MONOCYTES NFR BLD AUTO: 6.1 % (ref 5–12)
NEUTROPHILS NFR BLD AUTO: 5.24 10*3/MM3 (ref 1.7–7)
NEUTROPHILS NFR BLD AUTO: 59.9 % (ref 42.7–76)
NRBC BLD AUTO-RTO: 0 /100 WBC (ref 0–0.2)
PLATELET # BLD AUTO: 273 10*3/MM3 (ref 140–450)
PMV BLD AUTO: 9.4 FL (ref 6–12)
POTASSIUM SERPL-SCNC: 3.7 MMOL/L (ref 3.5–5.2)
PROT SERPL-MCNC: 6.1 G/DL (ref 6–8.5)
RBC # BLD AUTO: 4 10*6/MM3 (ref 3.77–5.28)
SODIUM SERPL-SCNC: 139 MMOL/L (ref 136–145)
WBC NRBC COR # BLD AUTO: 8.75 10*3/MM3 (ref 3.4–10.8)

## 2024-07-08 PROCEDURE — 36415 COLL VENOUS BLD VENIPUNCTURE: CPT

## 2024-07-08 PROCEDURE — 85025 COMPLETE CBC W/AUTO DIFF WBC: CPT

## 2024-07-08 PROCEDURE — 80053 COMPREHEN METABOLIC PANEL: CPT

## 2024-07-12 LAB
INTERPRETATION: POSITIVE
LAB DIRECTOR NAME PROVIDER: ABNORMAL
LABORATORY COMMENT REPORT: ABNORMAL
REF LAB TEST METHOD: ABNORMAL
T(ABL1,BCR)B2A2/CONTROL BLD/T: ABNORMAL %
T(ABL1,BCR)B3A2/CONTROL BLD/T: 0.04 %
T(ABL1,BCR)E1A2/CONTROL BLD/T: ABNORMAL %

## 2024-07-17 ENCOUNTER — HOSPITAL ENCOUNTER (OUTPATIENT)
Dept: ULTRASOUND IMAGING | Facility: HOSPITAL | Age: 39
Discharge: HOME | End: 2024-07-17
Payer: COMMERCIAL

## 2024-07-17 ENCOUNTER — HOSPITAL ENCOUNTER (OUTPATIENT)
Dept: MRI IMAGING | Facility: HOSPITAL | Age: 39
Discharge: HOME | End: 2024-07-17
Payer: COMMERCIAL

## 2024-07-17 DIAGNOSIS — N84.0 UTERINE POLYP: ICD-10-CM

## 2024-07-17 DIAGNOSIS — Z91.89 AT HIGH RISK FOR BREAST CANCER: ICD-10-CM

## 2024-07-17 PROCEDURE — 77049 MRI BREAST C-+ W/CAD BI: CPT

## 2024-07-17 PROCEDURE — A9577 INJ MULTIHANCE: HCPCS | Performed by: NURSE PRACTITIONER

## 2024-07-17 PROCEDURE — 0 GADOBENATE DIMEGLUMINE 529 MG/ML SOLUTION: Performed by: NURSE PRACTITIONER

## 2024-07-17 PROCEDURE — 76830 TRANSVAGINAL US NON-OB: CPT

## 2024-07-17 RX ADMIN — GADOBENATE DIMEGLUMINE 15 ML: 529 INJECTION, SOLUTION INTRAVENOUS at 10:30

## 2024-07-18 ENCOUNTER — HOSPITAL ENCOUNTER (OUTPATIENT)
Dept: ULTRASOUND IMAGING | Facility: HOSPITAL | Age: 39
Discharge: HOME | End: 2024-07-18
Payer: COMMERCIAL

## 2024-07-18 ENCOUNTER — HOSPITAL ENCOUNTER (OUTPATIENT)
Dept: MAMMOGRAPHY | Facility: HOSPITAL | Age: 39
Discharge: HOME | End: 2024-07-18
Payer: COMMERCIAL

## 2024-07-18 DIAGNOSIS — N63.0 BREAST MASS IN FEMALE: ICD-10-CM

## 2024-07-18 PROCEDURE — 77066 DX MAMMO INCL CAD BI: CPT

## 2024-07-18 PROCEDURE — 76642 ULTRASOUND BREAST LIMITED: CPT

## 2024-07-18 PROCEDURE — G0279 TOMOSYNTHESIS, MAMMO: HCPCS

## 2024-07-29 ENCOUNTER — OFFICE VISIT (OUTPATIENT)
Dept: ORTHOPEDIC SURGERY | Facility: CLINIC | Age: 39
End: 2024-07-29
Payer: COMMERCIAL

## 2024-07-29 VITALS
OXYGEN SATURATION: 99 % | HEIGHT: 60 IN | SYSTOLIC BLOOD PRESSURE: 121 MMHG | BODY MASS INDEX: 23.56 KG/M2 | WEIGHT: 120 LBS | HEART RATE: 94 BPM | DIASTOLIC BLOOD PRESSURE: 81 MMHG

## 2024-07-29 DIAGNOSIS — M75.52 BURSITIS OF LEFT SHOULDER: ICD-10-CM

## 2024-07-29 DIAGNOSIS — M25.512 LEFT SHOULDER PAIN, UNSPECIFIED CHRONICITY: Primary | ICD-10-CM

## 2024-07-29 RX ADMIN — TRIAMCINOLONE ACETONIDE 40 MG: 40 INJECTION, SUSPENSION INTRA-ARTICULAR; INTRAMUSCULAR at 11:00

## 2024-07-29 RX ADMIN — LIDOCAINE HYDROCHLORIDE 5 ML: 10 INJECTION, SOLUTION INFILTRATION; PERINEURAL at 11:00

## 2024-07-29 NOTE — PROGRESS NOTES
"Chief Complaint  Initial Evaluation of the Left Shoulder     Subjective      Sailaja Mercer presents to [facility] for initial evaluation of the left shoulder. She has pain in the AC joint and pain in the upper arm.  She has had injections in the past that has helped.      Allergies   Allergen Reactions    Nickel Rash        Social History     Socioeconomic History    Marital status:    Tobacco Use    Smoking status: Never     Passive exposure: Never    Smokeless tobacco: Never   Vaping Use    Vaping status: Never Used   Substance and Sexual Activity    Alcohol use: Not Currently     Comment: Current some day    Drug use: Never    Sexual activity: Yes     Partners: Male     Birth control/protection: Pill, OCP        I reviewed the patient's chief complaint, history of present illness, review of systems, past medical history, surgical history, family history, social history, medications, and allergy list.     Review of Systems     Constitutional: Denies fevers, chills, weight loss  Cardiovascular: Denies chest pain, shortness of breath  Skin: Denies rashes, acute skin changes  Neurologic: Denies headache, loss of consciousness        Vital Signs:   /81   Pulse 94   Ht 152.4 cm (60\")   Wt 54.4 kg (120 lb)   SpO2 99%   BMI 23.44 kg/m²          Physical Exam  General: Alert. No acute distress    Ortho Exam        LEFT SHOULDER Full ROM of the shoulder Negative Cross body adduction. Supraspinatus strength 5/5. Infraspinatus Strength 5/5. Infrared subscap 5/5. Positive Michelle. Positive Neer. Negative Apprehension. Negative Lift off. (Negative Obriens. Sensation intact to light touch, median, radial, ulnar nerve. Positive AIN, PIN, ulnar nerve motor. Positive pulses. Positive Impingement signs. Good strength in triceps, biceps, deltoid, wrist extensors and wrist flexors. Tender to palpation to the AC joint and down the arm.        Large Joint Arthrocentesis: L subacromial " bursa  Date/Time: 7/29/2024 11:00 AM  Consent given by: patient  Site marked: site marked  Timeout: Immediately prior to procedure a time out was called to verify the correct patient, procedure, equipment, support staff and site/side marked as required   Supporting Documentation  Indications: pain   Procedure Details  Location: shoulder - L subacromial bursa  Needle gauge: 21 G.  Medications administered: 5 mL lidocaine 1 %; 40 mg triamcinolone acetonide 40 MG/ML  Patient tolerance: patient tolerated the procedure well with no immediate complications        This injection documentation was Scribed for Eric Devine MD by Luke Bridges.  07/29/24   11:00 EDT      Imaging Results (Most Recent)       Procedure Component Value Units Date/Time    XR Scapula Left [[id number]] Resulted: 07/29/24 1046     Updated: 07/29/24 1049             Result Review :      X-Ray Report:  Left scapula X-Ray  Indication: Evaluation of the left scapula  AP/Lateral view(s)  Findings: Mild AC arthrosis.   Prior studies available for comparison: no           Assessment and Plan     Diagnoses and all orders for this visit:    1. Left shoulder pain, unspecified chronicity (Primary)  -     XR Scapula Left    2. Bursitis of left shoulder    Other orders  -     Large Joint Arthrocentesis: L subacromial bursa        Discussed the treatment plan with the patient. I reviewed the X-rays that were obtained today with the patient.     Discussed the risks and benefits of conservative measures. The patient expressed understanding and wished to proceed with a left shoulder steroid injection.  She tolerated the injection well.       Call or return if worsening symptoms.    Follow Up     PRN      Patient was given instructions and counseling regarding her condition or for health maintenance advice. Please see specific information pulled into the AVS if appropriate.     Transcribed for Eric Devine MD by Felecia Calle MA  07/29/24   11:00 EDT    I have  personally performed the services described in this document as scribed by the above individual and it is both accurate and complete. Eric Devine MD 07/31/24

## 2024-07-31 RX ORDER — LIDOCAINE HYDROCHLORIDE 10 MG/ML
5 INJECTION, SOLUTION INFILTRATION; PERINEURAL
Status: COMPLETED | OUTPATIENT
Start: 2024-07-29 | End: 2024-07-29

## 2024-07-31 RX ORDER — TRIAMCINOLONE ACETONIDE 40 MG/ML
40 INJECTION, SUSPENSION INTRA-ARTICULAR; INTRAMUSCULAR
Status: COMPLETED | OUTPATIENT
Start: 2024-07-29 | End: 2024-07-29

## 2024-08-01 ENCOUNTER — TELEPHONE (OUTPATIENT)
Dept: ONCOLOGY | Facility: HOSPITAL | Age: 39
End: 2024-08-01
Payer: COMMERCIAL

## 2024-08-01 NOTE — TELEPHONE ENCOUNTER
Caller: Sailaja Mercer    Relationship to patient: Self    Best call back number: [phone]    Chief complaint: RESCHEDULE     Type of visit: FOLLOW UP    Requested date: LATE IN THE DAY OR FIRST APPT IN THE MORNING     If rescheduling, when is the original appointment: 7-24     Additional notes:PLEASE ADVISE

## 2024-08-05 DIAGNOSIS — R11.0 NAUSEA: ICD-10-CM

## 2024-08-06 RX ORDER — ONDANSETRON 4 MG/1
4 TABLET, FILM COATED ORAL EVERY 8 HOURS PRN
Qty: 30 TABLET | Refills: 3 | Status: SHIPPED | OUTPATIENT
Start: 2024-08-06

## 2024-08-20 DIAGNOSIS — Z30.09 BIRTH CONTROL COUNSELING: ICD-10-CM

## 2024-08-21 ENCOUNTER — TELEPHONE (OUTPATIENT)
Dept: OBSTETRICS AND GYNECOLOGY | Facility: CLINIC | Age: 39
End: 2024-08-21
Payer: COMMERCIAL

## 2024-08-21 RX ORDER — NORETHINDRONE ACETATE AND ETHINYL ESTRADIOL, ETHINYL ESTRADIOL AND FERROUS FUMARATE 1MG-10(24)
1 KIT ORAL DAILY
Qty: 84 TABLET | Refills: 0 | OUTPATIENT
Start: 2024-08-21

## 2024-08-21 NOTE — TELEPHONE ENCOUNTER
Denied refill on Lo Loestrin.  Per notes in Epic patient to get IUD.  Last seen 6/27/24.  No appointment scheduled

## 2024-08-24 DIAGNOSIS — Z30.09 BIRTH CONTROL COUNSELING: ICD-10-CM

## 2024-08-24 RX ORDER — NORETHINDRONE ACETATE AND ETHINYL ESTRADIOL, ETHINYL ESTRADIOL AND FERROUS FUMARATE 1MG-10(24)
1 KIT ORAL DAILY
Qty: 84 TABLET | Refills: 3 | Status: SHIPPED | OUTPATIENT
Start: 2024-08-24

## 2024-09-09 ENCOUNTER — OFFICE VISIT (OUTPATIENT)
Dept: ONCOLOGY | Facility: HOSPITAL | Age: 39
End: 2024-09-09
Payer: COMMERCIAL

## 2024-09-09 VITALS
BODY MASS INDEX: 24.46 KG/M2 | OXYGEN SATURATION: 100 % | WEIGHT: 124.6 LBS | TEMPERATURE: 97.5 F | HEART RATE: 86 BPM | RESPIRATION RATE: 20 BRPM | SYSTOLIC BLOOD PRESSURE: 117 MMHG | DIASTOLIC BLOOD PRESSURE: 89 MMHG | HEIGHT: 60 IN

## 2024-09-09 DIAGNOSIS — C92.10 CML (CHRONIC MYELOCYTIC LEUKEMIA): Primary | ICD-10-CM

## 2024-09-09 PROCEDURE — 99214 OFFICE O/P EST MOD 30 MIN: CPT | Performed by: INTERNAL MEDICINE

## 2024-09-09 PROCEDURE — G0463 HOSPITAL OUTPT CLINIC VISIT: HCPCS | Performed by: INTERNAL MEDICINE

## 2024-09-09 NOTE — PROGRESS NOTES
Chief Complaint  CML    Provider, No Known  Silvino, MYRON Pham          Sailaja Mercer presents to [facility] for Ongoing treatment of her chronic phase CML.  She is on Gleevec.  She is compliant with the regimen.  She notes occasional nausea but Zofran is effective when needed.  She denies recurrent infections, fever, chills, weight loss, night sweats or adenopathy.  She denies excessive bruising or bleeding.  She has good appetite.  She has good energy level.    Oncology/Hematology History Overview Note   CML-dx June 2012            Clinical Staging      CHRONIC PHASE CML            Treatments      Chemotherapy      Began Gleevec Summer 2012        Cancer (Resolved)   6/28/2021 Initial Diagnosis    Cancer (CMS/HCC)     Leukemia (Resolved)   6/28/2021 Initial Diagnosis    Leukemia (CMS/HCC)     CML (chronic myelocytic leukemia)   7/15/2021 Initial Diagnosis    CML (chronic myelocytic leukemia) (CMS/HCC)     7/15/2021 Cancer Staged    Staging form: Chronic Myeloid Leukemia (Cml, AJCC 8th Edition  - Clinical: Ph+, Additional clonal changes: Absent - Signed by Sachin Walton MD on 7/15/2021      Chemotherapy    Gleevec initiated 2012         Review of Systems  Current Outpatient Medications on File Prior to Visit   Medication Sig Dispense Refill    cetirizine (zyrTEC) 10 MG tablet Take 1 tablet by mouth Daily.      imatinib (Gleevec) 400 MG chemo tablet Take 1 tablet by mouth once daily (Patient taking differently: Take 1 tablet by mouth Daily Before Supper.) 30 tablet 11    Norethin-Eth Estrad-Fe Biphas (Lo Loestrin Fe) 1 MG-10 MCG / 10 MCG tablet Take 1 tablet by mouth Daily. 84 tablet 3    ondansetron (ZOFRAN) 4 MG tablet TAKE 1 TABLET BY MOUTH EVERY 8 HOURS AS NEEDED FOR NAUSEA OR VOMITING 30 tablet 3    [DISCONTINUED] amoxicillin-clavulanate (AUGMENTIN) 125-31.25 MG/5ML (4:1) suspension  (Patient not taking: Reported on 9/9/2024)      [DISCONTINUED]  meclizine (ANTIVERT) 25 MG tablet TAKE 1 TABLET BY MOUTH THREE TIMES DAILY AS NEEDED FOR VERTIGO (Patient not taking: Reported on 9/9/2024)      [DISCONTINUED] prednisoLONE acetate (PRED FORTE) 1 % ophthalmic suspension  (Patient not taking: Reported on 9/9/2024)      [DISCONTINUED] predniSONE (DELTASONE) 20 MG tablet  (Patient not taking: Reported on 9/9/2024)       No current facility-administered medications on file prior to visit.       Allergies   Allergen Reactions    Nickel Rash     Past Medical History:   Diagnosis Date    Abnormal Pap smear of cervix     Anxiety     CML (chronic myelocytic leukemia)     2+ years ago, DAILY THERAPY WITH NO CURRENT ISSUES    CTS (carpal tunnel syndrome)     Seasonal allergies      Past Surgical History:   Procedure Laterality Date    APPENDECTOMY  06/30/2012    BREAST BIOPSY Bilateral     BREAST LUMPECTOMY Left 04/14/2023    Procedure: Excision of left breast mass;  Surgeon: Erma Quiros MD;  Location: [facility];  Service: General;  Laterality: Left;    CARPAL TUNNEL RELEASE Right 12/14/2023    Procedure: CARPAL TUNNEL RELEASE;  Surgeon: Eric Devine MD;  Location: [facility];  Service: Orthopedics;  Laterality: Right;    COLONOSCOPY  2015    ENDOSCOPY  2015     Social History     Socioeconomic History    Marital status:    Tobacco Use    Smoking status: Never     Passive exposure: Never    Smokeless tobacco: Never   Vaping Use    Vaping status: Never Used   Substance and Sexual Activity    Alcohol use: Not Currently     Comment: Current some day    Drug use: Never    Sexual activity: Yes     Partners: Male     Birth control/protection: Pill, OCP     Family History   Problem Relation Age of Onset    Breast cancer Mother 42    Lung cancer Paternal Grandmother     Breast cancer Maternal Grandmother 50    Ovarian cancer Maternal Grandmother     Uterine cancer Neg Hx     Prostate cancer Neg Hx     Colon cancer Neg Hx     Malig Hyperthermia Neg Hx      "Deep vein thrombosis Neg Hx     Pulmonary embolism Neg Hx        Objective   Physical Exam  Vitals reviewed. Exam conducted with a chaperone present.   Cardiovascular:      Rate and Rhythm: Normal rate and regular rhythm.      Heart sounds: Normal heart sounds. No murmur heard.     No gallop.   Pulmonary:      Effort: Pulmonary effort is normal.      Breath sounds: Normal breath sounds.   Abdominal:      General: Bowel sounds are normal.   Lymphadenopathy:      Cervical: No cervical adenopathy.   Psychiatric:         Mood and Affect: Mood normal.         Behavior: Behavior normal.         Vitals:    09/09/24 0849   BP: 117/89   Pulse: 86   Resp: 20   Temp: 97.5 °F (36.4 °C)   TempSrc: Temporal   SpO2: 100%   Weight: 56.5 kg (124 lb 9.6 oz)   Height: 152.4 cm (60\")   PainSc: 0-No pain     ECOG score: 0         PHQ-9 Total Score:                    Result Review :   The following data was reviewed by: Sachin Walton MD on 09/09/2024:  Lab Results   Component Value Date    HGB 13.7 07/08/2024    HCT 39.7 07/08/2024    MCV 99.3 (H) 07/08/2024     07/08/2024    WBC 8.75 07/08/2024    NEUTROABS 5.24 07/08/2024    LYMPHSABS 2.90 07/08/2024    MONOSABS 0.53 07/08/2024    EOSABS 0.04 07/08/2024    BASOSABS 0.01 07/08/2024     Lab Results   Component Value Date    GLUCOSE 76 07/08/2024    BUN 14 07/08/2024    CREATININE 0.84 07/08/2024     07/08/2024    K 3.7 07/08/2024     07/08/2024    CO2 25.9 07/08/2024    CALCIUM 8.8 07/08/2024    PROTEINTOT 6.1 07/08/2024    ALBUMIN 4.2 07/08/2024    BILITOT 0.6 07/08/2024    ALKPHOS 49 07/08/2024    AST 15 07/08/2024    ALT 12 07/08/2024     Lab Results   Component Value Date    FREET4 1.38 09/20/2023    TSH 1.040 09/20/2023     No results found for: \"IRON\", \"LABIRON\", \"TRANSFERRIN\", \"TIBC\"  Lab Results   Component Value Date     07/26/2019    ZJCJQCKP78 569 11/11/2022    FOLATE 12.40 11/11/2022     No results found for: \"PSA\", \"CEA\", \"AFP\", \"\", " "\"\"  PCR for BCR/ABL positive at 0.04          Assessment and Plan    Diagnoses and all orders for this visit:    1. CML (chronic myelocytic leukemia) (Primary)  Assessment & Plan:  Patient is on Gleevec since 2012.  Mild nausea occasionally with the regimen but otherwise tolerating well.  Zofran is effective when needed.  PCR for BCR/ABL still shows a major molecular response and 0.04.  Her CMP and CBC are normal.  Continue with current therapy.  I will see her back in 6 months for ongoing treatment lab work prior including CBC, CMP, PCR for BCR/ABL    Orders:  -     CBC & Differential; Future  -     Comprehensive Metabolic Panel; Future  -     BCR-ABL1, CML / ALL, PCR, Quant; Future            Patient Follow Up: 6 months    Patient was given instructions and counseling regarding her condition or for health maintenance advice. Please see specific information pulled into the AVS if appropriate.     Sachin Walton MD    9/9/2024            "

## 2024-09-09 NOTE — ASSESSMENT & PLAN NOTE
Patient is on Gleevec since 2012.  Mild nausea occasionally with the regimen but otherwise tolerating well.  Zofran is effective when needed.  PCR for BCR/ABL still shows a major molecular response and 0.04.  Her CMP and CBC are normal.  Continue with current therapy.  I will see her back in 6 months for ongoing treatment lab work prior including CBC, CMP, PCR for BCR/ABL

## 2024-09-10 ENCOUNTER — SPECIALTY PHARMACY (OUTPATIENT)
Dept: PHARMACY | Facility: HOSPITAL | Age: 39
End: 2024-09-10
Payer: COMMERCIAL

## 2024-10-29 ENCOUNTER — SPECIALTY PHARMACY (OUTPATIENT)
Dept: PHARMACY | Facility: HOSPITAL | Age: 39
End: 2024-10-29
Payer: COMMERCIAL

## 2024-10-29 DIAGNOSIS — C92.10 CML (CHRONIC MYELOCYTIC LEUKEMIA): ICD-10-CM

## 2024-10-29 RX ORDER — IMATINIB MESYLATE 400 MG/1
400 TABLET, FILM COATED ORAL DAILY
Qty: 30 TABLET | Refills: 11 | Status: SHIPPED | OUTPATIENT
Start: 2024-10-29

## 2024-10-29 NOTE — TELEPHONE ENCOUNTER
Caller: [facility] - [place], [place] - [address] [place] [address] - [phone] PH - [phone] FX    Relationship: Pharmacy    Best call back number: [phone]    Requested Prescriptions:   Requested Prescriptions     Pending Prescriptions Disp Refills    imatinib (GLEEVEC) 400 MG chemo tablet 30 tablet 11     Sig: Take 1 tablet by mouth Daily.        Pharmacy where request should be sent: [facility] - [place] [phone] [phone] FX     Last office visit with prescribing clinician: 9/9/2024   Last telemedicine visit with prescribing clinician: Visit date not found   Next office visit with prescribing clinician: 3/13/2025     Does the patient have less than a 3 day supply:  [] Yes  [] No    Would you like a call back once the refill request has been completed: [] Yes [x] No    If the office needs to give you a call back, can they leave a voicemail: [] Yes [x] No    Pat Watts Rep   10/29/24 10:15 EDT

## 2024-10-29 NOTE — PROGRESS NOTES
Re: Refills of Oral Specialty Medication - Gleevec (imatinib)    Drug-Drug Interactions: The current medication list was reviewed and there are no relevant drug-drug interactions with the specialty medication.  Medication Allergies: The patient has no relevant allergies as it relates to their oral specialty medication  Review of Labs/Dose Adjustments: NO DOSE CHANGE - I reviewed the most recent note and labs and the patient will continue without any dose changes.  I sent refills as described below.    Drug: Gleevec (imatinib)  Strength: 400 mg  Directions: Take 1 tablet by mouth daily  Quantity: 30  Refills: 11  Pharmacy prescription sent to: [facility]      Syeda Grande, PharmD, BCPS  Oncology Clinical Pharmacist  10/29/2024  14:10 EDT

## 2024-12-09 ENCOUNTER — PATIENT ROUNDING (BHMG ONLY) (OUTPATIENT)
Dept: URGENT CARE | Facility: CLINIC | Age: 39
End: 2024-12-09
Payer: COMMERCIAL

## 2024-12-09 NOTE — ED NOTES
Thank you for letting us care for you in your recent visit to our urgent care center. We would love to hear about your experience with us. Was this the first time you have visited our location?    We’re always looking for ways to make our patients’ experiences even better. Do you have any recommendations on ways we may improve?     I appreciate you taking the time to respond. Please be on the lookout for a survey about your recent visit from [employer] via text or email. We would greatly appreciate if you could fill that out and turn it back in. We want your voice to be heard and we value your feedback.   Thank you for choosing [facility] for your healthcare needs.

## 2025-01-04 NOTE — TELEPHONE ENCOUNTER
"Provider: DAYANNA JOHNSON    Caller: SELF    Relationship to Patient: SELF    Pharmacy: [facility]    Phone Number: [phone]    Reason for Call: PT ATTEMPTED TO GET HER BIRTH CONTROL LOESTRIN FROM RX BUT WAS DENIED AS \"REFILL NOT APPRORIATE\" THERE WAS A NOTE ABOUT AN IUD BUT SHE DECIDED AGAINST AN IUD. CAN SOMEONE APPROVED THIS REFILL?    When was the patient last seen: 06-18-24    " home

## 2025-01-10 ENCOUNTER — SPECIALTY PHARMACY (OUTPATIENT)
Dept: PHARMACY | Facility: HOSPITAL | Age: 40
End: 2025-01-10
Payer: COMMERCIAL

## 2025-01-30 ENCOUNTER — OFFICE VISIT (OUTPATIENT)
Dept: OTOLARYNGOLOGY | Facility: CLINIC | Age: 40
End: 2025-01-30
Payer: COMMERCIAL

## 2025-01-30 VITALS
SYSTOLIC BLOOD PRESSURE: 115 MMHG | WEIGHT: 125 LBS | TEMPERATURE: 97.2 F | BODY MASS INDEX: 24.54 KG/M2 | HEART RATE: 82 BPM | HEIGHT: 60 IN | DIASTOLIC BLOOD PRESSURE: 81 MMHG

## 2025-01-30 DIAGNOSIS — J34.3 HYPERTROPHY OF INFERIOR NASAL TURBINATE: ICD-10-CM

## 2025-01-30 DIAGNOSIS — H61.21 HEARING LOSS DUE TO CERUMEN IMPACTION, RIGHT: Primary | ICD-10-CM

## 2025-01-30 DIAGNOSIS — G89.29 CHRONIC HEADACHE WITHOUT NEW FEATURES: ICD-10-CM

## 2025-01-30 DIAGNOSIS — R51.9 CHRONIC HEADACHE WITHOUT NEW FEATURES: ICD-10-CM

## 2025-01-30 DIAGNOSIS — C92.10 CML (CHRONIC MYELOCYTIC LEUKEMIA): ICD-10-CM

## 2025-01-30 DIAGNOSIS — R51.9 CHRONIC FACIAL PAIN: ICD-10-CM

## 2025-01-30 DIAGNOSIS — J32.9 CHRONIC SINUSITIS, UNSPECIFIED LOCATION: ICD-10-CM

## 2025-01-30 DIAGNOSIS — J33.8 MAXILLARY SINUS POLYP: ICD-10-CM

## 2025-01-30 DIAGNOSIS — G89.29 CHRONIC FACIAL PAIN: ICD-10-CM

## 2025-01-30 DIAGNOSIS — J30.9 ALLERGIC RHINITIS, UNSPECIFIED SEASONALITY, UNSPECIFIED TRIGGER: ICD-10-CM

## 2025-01-30 PROCEDURE — 99203 OFFICE O/P NEW LOW 30 MIN: CPT | Performed by: OTOLARYNGOLOGY

## 2025-01-30 PROCEDURE — 69210 REMOVE IMPACTED EAR WAX UNI: CPT | Performed by: OTOLARYNGOLOGY

## 2025-01-30 RX ORDER — MONTELUKAST SODIUM 10 MG/1
10 TABLET ORAL DAILY
Qty: 30 TABLET | Refills: 3 | Status: SHIPPED | OUTPATIENT
Start: 2025-01-30

## 2025-01-30 RX ORDER — FLUTICASONE PROPIONATE 50 MCG
2 SPRAY, SUSPENSION (ML) NASAL DAILY
Qty: 16 G | Refills: 6 | Status: SHIPPED | OUTPATIENT
Start: 2025-01-30

## 2025-01-30 NOTE — PATIENT INSTRUCTIONS
1.  Patient's vertigo has resolved however if it should recur we may have to consider looking into whether CML has some contribution to it.  2.  Patient's chronic sinusitis which she was diagnosed with but however CT scan does not seem to show any evidence of it.  Her left maxillary sinus polyp probably is an incidental finding but with no significant symptoms that it elicits.  The right sphenoid sinus which is little bit smaller compared to the left but it does have mucus within the sinus however sphenoid sinus ostium is wide open.  Therefore I do not see this causing any major problems.  But certainly we can revisit this at a later time if she continues to have issues.  3.  I will see patient in 6 months to recheck and see how she is doing.

## 2025-01-30 NOTE — PROGRESS NOTES
PATIENT INFORMATION    Anticipatory guidance:  Drugs, ETOH, and tobacco  Importance of regular dental care  Importance of regular exercise  Importance of varied diet  Testicular self-exam    Follow-Up  - Return in 1 year for your yearly well visit.    13-17 years old Health and Safety Tips - The following hyperlinks are available to access via MyChart    Bright Futures 11-14 Years Old Parent Education  SPI Lasers Futures 11-14 Years Old Child Education    Futuros Brillantes 11 a 14 años de edad (Educación para los padres) - Español  Futuros Brillantes 11 a 14 años de edad (Educación para los niños) - Español    Bright Futures 15-17 Years Old Parent Education  [place] Futures 15-17 Years Old Child Education    Futuros Brillantes 15 a 17 años de edad (Educación para los padres) - Español  Futuros Brillantes 15 a 17 años de edad (Educación para los niños) - Español    Additional Educational Resources:  For additional resources regarding your symptoms, diagnosis, or further health information, please visit the Discover a Healthier You section on [email or web address].com/ or the Online Health Resources section in [facility].   Patient Name: Sailaja Mercer   Visit Date: 01/30/2025   Patient ID: [id number]  Provider: Frantz Santiago MD    Sex: female  Location: [facility]   YOB: 1985  Location Address: [address] [place] [address], [address] [place] [address],?[place]?[id number]    Primary Care Provider Yamila Dubois APRN  Location Phone: [phone]    Referring Provider: No ref. provider found        Chief Complaint  Establish Care and Sinus Problem    History of Present Illness  Sailaja Mercer is a 39 y.o. female who presents to [facility] for Establish Care and Sinus Problem.   Patient was seen by Dung Reid on 4/16/2021 at [facility] clinic for follow-up on audio and VNG which patient was still having vertigo.  Patient also has history of CML treated by Dr. Walton.  VNG shows no nystagmus positional testing was normal and [place] had normal reaction on each direction but borderline caloric weakness on the right and right directional preponderance possibly consistent with mild vestibular dysfunction on the right side.  Patient is now here with complaints of sinus infections.  Audiogram was normal with type a tympanograms.  Patient was started on home Epley with daily exercises.  Since then patient has been doing quite well with no recurrent vertigo.  However she has had recurrent sinus infection where she has been treated with 6 months of antibiotics and steroid.  She actually ended up in the emergency room and had a CT scan of sinuses obtained on 6/23/2024 which was fairly normal except for maybe mucus in the right sphenoid but its ostium is open and she on the left maxillary sinus superiorly and laterally has a small what appears to be a polyp.  Otherwise sinus mucosa is unremarkable there is no evidence to suggest chronic sinusitis.  Patient was referred to ENT 6 months ago but recently few months ago she started developing symptoms again and was worried that she might end up  with this terrible pain in the frontal area and facial area.  Currently patient reports that she has significant pressure pain in the frontal area between the eyes.  Patient also had some swollen lymph nodes in the right neck area and pain radiating up to the right ear.    Past Medical History:   Diagnosis Date    Abnormal Pap smear of cervix     Allergic rhinitis Summer 2024    1st Chronic Sinus Infection    Anxiety     CML (chronic myelocytic leukemia)     2+ years ago, DAILY THERAPY WITH NO CURRENT ISSUES    CTS (carpal tunnel syndrome)     Headache     When I have a chronic sinus infections    Seasonal allergies     Sinusitis     When I have a chronic sinus infections       Past Surgical History:   Procedure Laterality Date    APPENDECTOMY  06/30/2012    BREAST BIOPSY Bilateral     BREAST LUMPECTOMY Left 04/14/2023    Procedure: Excision of left breast mass;  Surgeon: Erma Quiros MD;  [place] [facility];  Service: General;  Laterality: Left;    CARPAL TUNNEL RELEASE Right 12/14/2023    Procedure: CARPAL TUNNEL RELEASE;  Surgeon: Eric Devine MD;  [facility] [facility];  Service: Orthopedics;  Laterality: Right;    COLONOSCOPY  2015    ENDOSCOPY  2015         Current Outpatient Medications:     imatinib (GLEEVEC) 400 MG chemo tablet, Take 1 tablet by mouth Daily., Disp: 30 tablet, Rfl: 11    Norethin-Eth Estrad-Fe Biphas (Lo Loestrin Fe) 1 MG-10 MCG / 10 MCG tablet, Take 1 tablet by mouth Daily., Disp: 84 tablet, Rfl: 3    ondansetron (ZOFRAN) 4 MG tablet, TAKE 1 TABLET BY MOUTH EVERY 8 HOURS AS NEEDED FOR NAUSEA OR VOMITING, Disp: 30 tablet, Rfl: 3    azithromycin (Zithromax Z-Farzad) 250 MG tablet, Take 2 tablets by mouth on day 1, then 1 tablet daily on days 2-5 (Patient not taking: Reported on 1/30/2025), Disp: 6 tablet, Rfl: 0    cetirizine (zyrTEC) 10 MG tablet, Take 1 tablet by mouth Daily. (Patient not taking: Reported on 1/30/2025), Disp: , Rfl:      Allergies   Allergen Reactions     "Nickel Rash       Social History     Tobacco Use    Smoking status: Never     Passive exposure: Never    Smokeless tobacco: Never   Vaping Use    Vaping status: Never Used   Substance Use Topics    Alcohol use: Not Currently     Comment: Current some day    Drug use: Never        Objective     Vital Signs:   Vitals:    01/30/25 1609   BP: 115/81   Pulse: 82   Temp: 97.2 °F (36.2 °C)   Weight: 56.7 kg (125 lb)   Height: 152.4 cm (60\")       Tobacco Use: Low Risk  (1/30/2025)    Patient History     Smoking Tobacco Use: Never     Smokeless Tobacco Use: Never     Passive Exposure: Never         Physical Exam    Constitutional   Appearance  well developed, well-nourished, alert and in no acute distress, voice clear and strong    Head   Inspection  no deformities or lesions      Face   Inspection  no facial lesions; House-Brackmann I/VI bilaterally   Palpation  no TMJ crepitus nor  muscle tenderness bilaterally     Eyes/Vision   Visual Fields  extraocular movements are intact, no spontaneous or gaze-induced nystagmus  Conjunctivae  clear   Sclerae  clear   Pupils and Irises  pupils equal, round, and reactive to light.   Nystagmus  not present     Ears, Nose, Mouth and Throat  Ears  External Ears  appearance within normal limits, no lesions present   Otoscopic Examination  Right ear canal with cerumen impaction  Left TM and EACs clear.  Hearing  intact to conversational voice both ears   Tunning fork testing    Rinne:  Weir:    Nose  External Nose  appearance normal   Intranasal Exam  mucosa within normal limits, vestibules normal, no intranasal lesions present, septum midline, sinuses non tender to percussion   Modified Avery Test:    Oral Cavity  Oral Mucosa  oral mucosa normal without pallor or cyanosis   Lips  lip appearance normal   Teeth  normal dentition for age   Gums  gums pink, non-swollen, no bleeding present   Tongue  tongue appearance normal; normal mobility   Palate  hard palate normal, soft " "palate appearance normal with symmetric mobility     Throat  Oropharynx  no inflammation or lesions present, tonsils within normal limits   Hypopharynx  appearance within normal limits   Larynx  voice normal     Neck  Inspection/Palpation  normal appearance, no masses or tenderness, trachea midline; thyroid size normal, nontender, no nodules or masses present on palpation     Lymphatic  Neck  no lymphadenopathy present   Supraclavicular Nodes  no lymphadenopathy present   Preauricular Nodes  no lymphadenopathy present     Respiratory  Respiratory Effort  breathing unlabored   Inspection of Chest  normal appearance, no retractions     Musculoskeletal   Cervical back: Normal range of motion and neck supple.      Skin and Subcutaneous Tissue  General Inspection  Regarding face and neck - there are no rashes present, no lesions present, and no areas of discoloration     Neurologic  Cranial Nerves  cranial nerves II-XII are grossly intact bilaterally   Gait and Station  normal gait, able to stand without diffculty    Psychiatric  Judgement and Insight  judgment and insight intact   Mood and Affect  mood normal, affect appropriate       RESULTS REVIEWED    I have reviewed the following information:   [x]  Previous Internal Note  []  Previous External Note:   [x]  Ordered Tests & Results:      Pathology: No results found for: \"MICRO\"    TSH   Date Value Ref Range Status   09/20/2023 1.040 0.270 - 4.200 uIU/mL Final     Free T4   Date Value Ref Range Status   09/20/2023 1.38 0.93 - 1.70 ng/dL Final     Calcium   Date Value Ref Range Status   07/08/2024 8.8 8.6 - 10.5 mg/dL Final     25 Hydroxy, Vitamin D   Date Value Ref Range Status   11/11/2022 55.3 30.0 - 100.0 ng/ml Final       No Images in the past 120 days found..    I have discussed the interpretation of the above results with the patient.    Ear Cerumen Removal    Date/Time: 1/30/2025 4:55 PM    Performed by: Frantz Santiago MD  Authorized by: Frantz Santiago MD  "   Anesthesia:  Local Anesthetic: none  Location details: right ear  Patient tolerance: patient tolerated the procedure well with no immediate complications  Comments: Under microscope right ear cerumen impaction was cleaned with using mostly suction, alligator forceps and curette.  Right TM is otherwise unremarkable.  Left TM and EAC is clear.  Procedure type: instrumentation, suction, alligator forceps, curette   Sedation:  Patient sedated: no                  Assessment and Plan   Diagnoses and all orders for this visit:    1. Hearing loss due to cerumen impaction, right (Primary)  -     Ear Cerumen Removal    2. CML (chronic myelocytic leukemia)    3. Chronic sinusitis, unspecified location    4. Chronic facial pain    5. Chronic headache without new features    6. Maxillary sinus polyp        (H61.21) Hearing loss due to cerumen impaction, right - Plan: Ear Cerumen Removal    (C92.10) CML (chronic myelocytic leukemia)    (J32.9) Chronic sinusitis, unspecified location    (R51.9,  G89.29) Chronic facial pain    (R51.9,  G89.29) Chronic headache without new features    (J33.8) Maxillary sinus polyp     Sailaja Mercer  reports that she has never smoked. She has never been exposed to tobacco smoke. She has never used smokeless tobacco.         Plan:  Patient Instructions   1.  Patient's vertigo has resolved however if it should recur we may have to consider looking into whether CML has some contribution to it.  2.  Patient's chronic sinusitis which she was diagnosed with but however CT scan does not seem to show any evidence of it.  Her left maxillary sinus polyp probably is an incidental finding but with no significant symptoms that it elicits.  The right sphenoid sinus which is little bit smaller compared to the left but it does have mucus within the sinus however sphenoid sinus ostium is wide open.  Therefore I do not see this causing any major problems.  But certainly we can revisit this at a later time if she  continues to have issues.  3.  I will see patient in 6 months to recheck and see how she is doing.        Follow Up   Return in about 6 months (around 7/30/2025), or Follow-up 6 months for exam.  Patient was given instructions and counseling regarding her condition or for health maintenance advice. Please see specific information pulled into the AVS if appropriate.

## 2025-02-05 DIAGNOSIS — R11.0 NAUSEA: ICD-10-CM

## 2025-02-06 RX ORDER — ONDANSETRON 4 MG/1
4 TABLET, FILM COATED ORAL EVERY 8 HOURS PRN
Qty: 30 TABLET | Refills: 3 | Status: SHIPPED | OUTPATIENT
Start: 2025-02-06

## 2025-02-06 RX ORDER — NORETHINDRONE ACETATE AND ETHINYL ESTRADIOL, ETHINYL ESTRADIOL AND FERROUS FUMARATE 1MG-10(24)
1 KIT ORAL DAILY
Qty: 84 TABLET | Refills: 3 | OUTPATIENT
Start: 2025-02-06

## 2025-02-06 NOTE — TELEPHONE ENCOUNTER
RTC in 6 months for annual testing The pt is requesting refills of Lo Loestrin Fe.  She was last seen on 06/27/2024 and doesn't have an upcoming visit scheduled yet.  The Rx was last sent to the pharmacy 08/24/2024 #84 with 3 additional refills and is still current.  I have sent the pt a [employer] message to call her pharmacy to get her medication filled and ready for her.

## 2025-02-17 ENCOUNTER — LAB (OUTPATIENT)
Dept: LAB | Facility: HOSPITAL | Age: 40
End: 2025-02-17
Payer: COMMERCIAL

## 2025-02-17 DIAGNOSIS — C92.10 CML (CHRONIC MYELOCYTIC LEUKEMIA): ICD-10-CM

## 2025-02-17 LAB
ALBUMIN SERPL-MCNC: 4.4 G/DL (ref 3.5–5.2)
ALBUMIN/GLOB SERPL: 1.9 G/DL
ALP SERPL-CCNC: 49 U/L (ref 39–117)
ALT SERPL W P-5'-P-CCNC: 11 U/L (ref 1–33)
ANION GAP SERPL CALCULATED.3IONS-SCNC: 9.2 MMOL/L (ref 5–15)
AST SERPL-CCNC: 14 U/L (ref 1–32)
BASOPHILS # BLD AUTO: 0.01 10*3/MM3 (ref 0–0.2)
BASOPHILS NFR BLD AUTO: 0.2 % (ref 0–1.5)
BILIRUB SERPL-MCNC: 0.6 MG/DL (ref 0–1.2)
BUN SERPL-MCNC: 16 MG/DL (ref 6–20)
BUN/CREAT SERPL: 17 (ref 7–25)
CALCIUM SPEC-SCNC: 9.3 MG/DL (ref 8.6–10.5)
CHLORIDE SERPL-SCNC: 102 MMOL/L (ref 98–107)
CO2 SERPL-SCNC: 24.8 MMOL/L (ref 22–29)
CREAT SERPL-MCNC: 0.94 MG/DL (ref 0.57–1)
DEPRECATED RDW RBC AUTO: 44.7 FL (ref 37–54)
EGFRCR SERPLBLD CKD-EPI 2021: 79.8 ML/MIN/1.73
EOSINOPHIL # BLD AUTO: 0.03 10*3/MM3 (ref 0–0.4)
EOSINOPHIL NFR BLD AUTO: 0.6 % (ref 0.3–6.2)
ERYTHROCYTE [DISTWIDTH] IN BLOOD BY AUTOMATED COUNT: 11.9 % (ref 12.3–15.4)
GLOBULIN UR ELPH-MCNC: 2.3 GM/DL
GLUCOSE SERPL-MCNC: 101 MG/DL (ref 65–99)
HCT VFR BLD AUTO: 41.6 % (ref 34–46.6)
HGB BLD-MCNC: 13.6 G/DL (ref 12–15.9)
IMM GRANULOCYTES # BLD AUTO: 0.01 10*3/MM3 (ref 0–0.05)
IMM GRANULOCYTES NFR BLD AUTO: 0.2 % (ref 0–0.5)
LYMPHOCYTES # BLD AUTO: 1.61 10*3/MM3 (ref 0.7–3.1)
LYMPHOCYTES NFR BLD AUTO: 31.7 % (ref 19.6–45.3)
MCH RBC QN AUTO: 32.9 PG (ref 26.6–33)
MCHC RBC AUTO-ENTMCNC: 32.7 G/DL (ref 31.5–35.7)
MCV RBC AUTO: 100.5 FL (ref 79–97)
MONOCYTES # BLD AUTO: 0.36 10*3/MM3 (ref 0.1–0.9)
MONOCYTES NFR BLD AUTO: 7.1 % (ref 5–12)
NEUTROPHILS NFR BLD AUTO: 3.06 10*3/MM3 (ref 1.7–7)
NEUTROPHILS NFR BLD AUTO: 60.2 % (ref 42.7–76)
NRBC BLD AUTO-RTO: 0 /100 WBC (ref 0–0.2)
PLATELET # BLD AUTO: 239 10*3/MM3 (ref 140–450)
PMV BLD AUTO: 10.5 FL (ref 6–12)
POTASSIUM SERPL-SCNC: 3.9 MMOL/L (ref 3.5–5.2)
PROT SERPL-MCNC: 6.7 G/DL (ref 6–8.5)
RBC # BLD AUTO: 4.14 10*6/MM3 (ref 3.77–5.28)
SODIUM SERPL-SCNC: 136 MMOL/L (ref 136–145)
WBC NRBC COR # BLD AUTO: 5.08 10*3/MM3 (ref 3.4–10.8)

## 2025-02-17 PROCEDURE — 80053 COMPREHEN METABOLIC PANEL: CPT

## 2025-02-17 PROCEDURE — 85025 COMPLETE CBC W/AUTO DIFF WBC: CPT

## 2025-02-17 PROCEDURE — 36415 COLL VENOUS BLD VENIPUNCTURE: CPT

## 2025-02-24 ENCOUNTER — TELEPHONE (OUTPATIENT)
Dept: ONCOLOGY | Facility: HOSPITAL | Age: 40
End: 2025-02-24

## 2025-02-24 NOTE — TELEPHONE ENCOUNTER
Caller: Sailaja Mercer    Relationship: Self    Best call back number: [phone]    What is the best time to reach you: ANYTIME    Who are you requesting to speak with (clinical staff, provider,  specific staff member):     What was the call regarding: PLEASE CALL PATIENT TO R/S HER 3/13 APPT

## 2025-02-26 LAB
LAB DIRECTOR NAME PROVIDER: NORMAL
LABORATORY COMMENT REPORT: NORMAL
OBSERVATION IMP: NEGATIVE
REF LAB TEST METHOD: NORMAL
T(ABL1,BCR)B2A2/CONTROL BLD/T: NORMAL %
T(ABL1,BCR)B3A2/CONTROL BLD/T: NORMAL %
T(ABL1,BCR)E1A2/CONTROL BLD/T: NORMAL %
T(ABL1,BCR)E1A2/CONTROL BLD/T: NORMAL %

## 2025-03-18 RX ORDER — NORETHINDRONE ACETATE AND ETHINYL ESTRADIOL, ETHINYL ESTRADIOL AND FERROUS FUMARATE 1MG-10(24)
1 KIT ORAL DAILY
Qty: 84 TABLET | Refills: 1 | Status: SHIPPED | OUTPATIENT
Start: 2025-03-18

## 2025-03-18 NOTE — TELEPHONE ENCOUNTER
Pedro'd refill on Lo LoEstrin Fe #84 with 1 refill. Patient will need to schedule annual visit prior to further refills.

## 2025-04-03 ENCOUNTER — OFFICE VISIT (OUTPATIENT)
Dept: ONCOLOGY | Facility: HOSPITAL | Age: 40
End: 2025-04-03
Payer: COMMERCIAL

## 2025-04-03 ENCOUNTER — SPECIALTY PHARMACY (OUTPATIENT)
Dept: PHARMACY | Facility: HOSPITAL | Age: 40
End: 2025-04-03
Payer: COMMERCIAL

## 2025-04-03 VITALS
DIASTOLIC BLOOD PRESSURE: 71 MMHG | SYSTOLIC BLOOD PRESSURE: 113 MMHG | HEART RATE: 80 BPM | BODY MASS INDEX: 23.5 KG/M2 | HEIGHT: 60 IN | OXYGEN SATURATION: 100 % | WEIGHT: 119.71 LBS | TEMPERATURE: 98.6 F | RESPIRATION RATE: 16 BRPM

## 2025-04-03 DIAGNOSIS — C92.10 CML (CHRONIC MYELOCYTIC LEUKEMIA): Primary | ICD-10-CM

## 2025-04-03 PROCEDURE — G0463 HOSPITAL OUTPT CLINIC VISIT: HCPCS | Performed by: INTERNAL MEDICINE

## 2025-04-03 NOTE — PROGRESS NOTES
Chief Complaint  CML (chronic myelocytic leukemia) (/)    Yamila Dubois, Yamila Case, MYRON    Subjective          Sailaja Mercer presents to [facility] for ongoing treatment of her chronic phase CML.  She is on Gleevec.  She is compliant with the regimen.  She notes a little bit of nausea but Zofran is helpful when needed.  She denies recurrent infections, fever, chills, weight loss, night sweats or adenopathy.  She notes good energy level.  She denies excessive bruising or bleeding.    Oncology/Hematology History Overview Note   CML-dx June 2012            Clinical Staging      CHRONIC PHASE CML            Treatments      Chemotherapy      Began Gleevec Summer 2012        Cancer (Resolved)   6/28/2021 Initial Diagnosis    Cancer (CMS/HCC)     Leukemia (Resolved)   6/28/2021 Initial Diagnosis    Leukemia (CMS/HCC)     CML (chronic myelocytic leukemia)   7/15/2021 Initial Diagnosis    CML (chronic myelocytic leukemia) (CMS/HCC)     7/15/2021 Cancer Staged    Staging form: Chronic Myeloid Leukemia (Cml, AJCC 8th Edition  - Clinical: Ph+, Additional clonal changes: Absent - Signed by Sachin Walton MD on 7/15/2021      Chemotherapy    Gleevec initiated 2012           Current Outpatient Medications on File Prior to Visit   Medication Sig Dispense Refill    fluticasone (FLONASE) 50 MCG/ACT nasal spray Administer 2 sprays into the nostril(s) as directed by provider Daily. Administer 2 sprays in each nostril for each dose. 16 g 6    imatinib (GLEEVEC) 400 MG chemo tablet Take 1 tablet by mouth Daily. 30 tablet 11    montelukast (SINGULAIR) 10 MG tablet Take 1 tablet by mouth Daily. 30 tablet 3    Norethin-Eth Estrad-Fe Biphas (Lo Loestrin Fe) 1 MG-10 MCG / 10 MCG tablet TAKE 1 TABLET BY MOUTH DAILY 84 tablet 1    ondansetron (ZOFRAN) 4 MG tablet Take 1 tablet by mouth Every 8 (Eight) Hours As Needed for Nausea or Vomiting. 30 tablet 3    cetirizine (zyrTEC) 10 MG tablet  Take 1 tablet by mouth Daily. (Patient not taking: Reported on 1/30/2025)      [DISCONTINUED] azithromycin (Zithromax Z-Farzad) 250 MG tablet Take 2 tablets by mouth on day 1, then 1 tablet daily on days 2-5 (Patient not taking: Reported on 1/30/2025) 6 tablet 0     No current facility-administered medications on file prior to visit.       Allergies   Allergen Reactions    Nickel Rash     Past Medical History:   Diagnosis Date    Abnormal Pap smear of cervix     Allergic rhinitis Summer 2024    1st Chronic Sinus Infection    Anxiety     CML (chronic myelocytic leukemia)     2+ years ago, DAILY THERAPY WITH NO CURRENT ISSUES    CTS (carpal tunnel syndrome)     Headache     When I have a chronic sinus infections    Seasonal allergies     Sinusitis     When I have a chronic sinus infections     Past Surgical History:   Procedure Laterality Date    APPENDECTOMY  06/30/2012    BREAST BIOPSY Bilateral     BREAST LUMPECTOMY Left 04/14/2023    Procedure: Excision of left breast mass;  Surgeon: Erma Quiros MD;  Location: [facility] OR [facility] [place];  Service: General;  Laterality: Left;    CARPAL TUNNEL RELEASE Right 12/14/2023    Procedure: CARPAL TUNNEL RELEASE;  Surgeon: Eric Devine MD;  Location: [facility] OR [facility] [place];  Service: Orthopedics;  Laterality: Right;    COLONOSCOPY  2015    ENDOSCOPY  2015     Social History     Socioeconomic History    Marital status:    Tobacco Use    Smoking status: Never     Passive exposure: Never    Smokeless tobacco: Never   Vaping Use    Vaping status: Never Used   Substance and Sexual Activity    Alcohol use: Not Currently     Comment: Current some day    Drug use: Never    Sexual activity: Yes     Partners: Male     Birth control/protection: Pill, OCP     Family History   Problem Relation Age of Onset    Breast cancer Mother 42    Cancer Mother     Lung cancer Paternal Grandmother     Breast cancer Maternal Grandmother 50    Ovarian cancer Maternal Grandmother     Uterine cancer Neg Hx   "   Prostate cancer Neg Hx     Colon cancer Neg Hx     Malig Hyperthermia Neg Hx     Deep vein thrombosis Neg Hx     Pulmonary embolism Neg Hx        Objective   Physical Exam  Vitals reviewed. Exam conducted with a chaperone present.   Cardiovascular:      Rate and Rhythm: Normal rate and regular rhythm.      Heart sounds: Normal heart sounds. No murmur heard.     No gallop.   Pulmonary:      Effort: Pulmonary effort is normal.      Breath sounds: Normal breath sounds.   Abdominal:      General: Bowel sounds are normal.   Lymphadenopathy:      Cervical: No cervical adenopathy.   Psychiatric:         Mood and Affect: Mood normal.         Vitals:    04/03/25 1048   BP: 113/71   Pulse: 80   Resp: 16   Temp: 98.6 °F (37 °C)   TempSrc: Temporal   SpO2: 100%   Weight: 54.3 kg (119 lb 11.4 oz)   Height: 152.4 cm (60\")   PainSc: 0-No pain     ECOG score: 0         PHQ-9 Total Score:                    Result Review :   The following data was reviewed by: Sahcin Walton MD on 04/03/2025:  Lab Results   Component Value Date    HGB 13.6 02/17/2025    HCT 41.6 02/17/2025    .5 (H) 02/17/2025     02/17/2025    WBC 5.08 02/17/2025    NEUTROABS 3.06 02/17/2025    LYMPHSABS 1.61 02/17/2025    MONOSABS 0.36 02/17/2025    EOSABS 0.03 02/17/2025    BASOSABS 0.01 02/17/2025     Lab Results   Component Value Date    GLUCOSE 101 (H) 02/17/2025    BUN 16 02/17/2025    CREATININE 0.94 02/17/2025     02/17/2025    K 3.9 02/17/2025     02/17/2025    CO2 24.8 02/17/2025    CALCIUM 9.3 02/17/2025    PROTEINTOT 6.7 02/17/2025    ALBUMIN 4.4 02/17/2025    BILITOT 0.6 02/17/2025    ALKPHOS 49 02/17/2025    AST 14 02/17/2025    ALT 11 02/17/2025     Lab Results   Component Value Date    FREET4 1.38 09/20/2023    TSH 1.040 09/20/2023     No results found for: \"IRON\", \"LABIRON\", \"TRANSFERRIN\", \"TIBC\"  Lab Results   Component Value Date     07/26/2019    OPWBSSST54 569 11/11/2022    FOLATE 12.40 11/11/2022     No " "results found for: \"PSA\", \"CEA\", \"AFP\", \"\", \"\"  PCR for BCR/ABL undetectable          Assessment and Plan    Diagnoses and all orders for this visit:    1. CML (chronic myelocytic leukemia) (Primary)  Assessment & Plan:  Chronic phase.  Patient is on Gleevec.  She notes some mild nausea with the gleevec but zofran helps when needed.  CBC is normal.  PCR for BCR/ABL is undetectable-excellent results.  Continue current therapy.  I will see her back in 6 months for ongoing treatment with lab work prior including CBC, CMP, PCR for BCR/ABL.    Orders:  -     CBC & Differential; Future  -     Comprehensive Metabolic Panel; Future  -     BCR-ABL1, CML / ALL, PCR, Quant; Future            Patient Follow Up: 6 months    Patient was given instructions and counseling regarding her condition or for health maintenance advice. Please see specific information pulled into the AVS if appropriate.     Sachin Walton MD    4/3/2025            "

## 2025-04-03 NOTE — PROGRESS NOTES
Specialty Pharmacy Patient Management Program  Chart Review/Lab Monitoring     Sailaja Mercer is a 40 y.o. female with CML (Chronic Myeloid Leukemia) who presented for lab check and Oncology provider appointment. [facility] reviewed labs and providers note to assess continued therapy appropriateness of Gleevec (imatinib)    Oncology Interval History:  Chelsea Walton  Diagnosis: Chronic Phase CML - June 2012   Biomarkers: t(9;22)    Current Tx: Gleevec (imatinib) 400mg daily (7/2012-now, hx of DR for swelling, insomnia, fatigue, but then increased back to 400mg later on, held for a pregnancy late 2015-early 2016)  Most Recent Imaging: None   Previous Tx: None    Fills at: [facility]    4/3/25: Patient reported some nausea with imatinib, but is otherwise tolerating great. No changes.     Labs:  Lab Results   Component Value Date     02/17/2025    K 3.9 02/17/2025    CO2 24.8 02/17/2025     02/17/2025    BUN 16 02/17/2025    GLUCOSE 101 (H) 02/17/2025    CALCIUM 9.3 02/17/2025    CREATININE 0.94 02/17/2025    EGFRIFNONA 73 12/28/2021    BCR 17.0 02/17/2025    ANIONGAP 9.2 02/17/2025    AST 14 02/17/2025    ALT 11 02/17/2025    BILITOT 0.6 02/17/2025    ALKPHOS 49 02/17/2025     Lab Results   Component Value Date    WBC 5.08 02/17/2025    RBC 4.14 02/17/2025    HGB 13.6 02/17/2025    HCT 41.6 02/17/2025     02/17/2025    NEUTROABS 3.06 02/17/2025    LYMPHSABS 1.61 02/17/2025    MONOSABS 0.36 02/17/2025    EOSABS 0.03 02/17/2025    BASOSABS 0.01 02/17/2025     BCR-ABL:   06/29/21 10:06 01/02/23 09:52 12/28/23 10:20 07/08/24 08:15 02/17/25 09:55   Interpretation Positive Positive Positive ! Positive ! Negative     PLAN  Specialty pharmacy will continue to follow patient. Continue with current regimen as planned.     Lalitha Huff, PharmD  Oncology Clinical Specialty Pharmacist   4/3/2025 14:06 EDT

## 2025-04-03 NOTE — ASSESSMENT & PLAN NOTE
Chronic phase.  Patient is on Gleevec.  She notes some mild nausea with the gleevec but zofran helps when needed.  CBC is normal.  PCR for BCR/ABL is undetectable-excellent results.  Continue current therapy.  I will see her back in 6 months for ongoing treatment with lab work prior including CBC, CMP, PCR for BCR/ABL.

## 2025-06-02 NOTE — PROGRESS NOTES
Chief Complaint   Patient presents with    Annual Exam          HPI:   40 y.o. . Presents for well woman exam. Contraception:  Birth control pill  Menses: improved on OCP, occur  q 2 months, lasts 2-3 days, changes regular tampon q 4 hrs on heaviest days.   Pain:  None  Last pap: normal IGP,rfx Aptima HPV All Pth (2022 14:49)    Patient reports that she is not currently experiencing any symptoms of urinary incontinence.   SCANNED - LABS (2020)     She has a strong family history of breast cancer in maternal GM, maternal Aunt and mother, hx ovarian cancer in aunt. She desires permanent sterilization and non hormonal management of irregular bleeding patterns that she experienced when off of hormone contraception. She would like consultation for ablation.     Past Medical History:   Diagnosis Date    Abnormal Pap smear of cervix     Allergic rhinitis Summer 2024    1st Chronic Sinus Infection    Anxiety     CML (chronic myelocytic leukemia)     2+ years ago, DAILY THERAPY WITH NO CURRENT ISSUES    CTS (carpal tunnel syndrome)     Headache     When I have a chronic sinus infections    Seasonal allergies     Sinusitis     When I have a chronic sinus infections      Past Surgical History:   Procedure Laterality Date    APPENDECTOMY  2012    BREAST BIOPSY Bilateral     BREAST LUMPECTOMY Left 2023    Procedure: Excision of left breast mass;  Surgeon: Erma Quiros MD;  Location: [facility] OR [facility] [place];  Service: General;  Laterality: Left;    CARPAL TUNNEL RELEASE Right 2023    Procedure: CARPAL TUNNEL RELEASE;  Surgeon: Eric Devine MD;  Location: [facility] OR [facility] [place];  Service: Orthopedics;  Laterality: Right;    COLONOSCOPY      ENDOSCOPY        Family History   Problem Relation Age of Onset    Breast cancer Mother 42    Lung cancer Paternal Grandmother     Breast cancer Maternal Grandmother 50    Ovarian cancer Maternal Grandmother     Breast cancer Maternal Aunt 65    Uterine  cancer Neg Hx     Prostate cancer Neg Hx     Colon cancer Neg Hx     Malig Hyperthermia Neg Hx     Deep vein thrombosis Neg Hx     Pulmonary embolism Neg Hx      Allergies as of 06/20/2025 - Reviewed 06/20/2025   Allergen Reaction Noted    Nickel Rash 04/10/2023        PCP: does manage PMHx and preventative labs    BP 99/74   Pulse 78   Wt 53.1 kg (117 lb)   LMP 05/26/2025 (Approximate)   BMI 22.85 kg/m²   Physical Exam   Chaperone present   General- NAD, alert and oriented, appropriate  Psych- Normal mood, good memory  Neck- No masses, no thyroid enlargement  Lymphatic- No palpable neck, axillary, or groin nodes  CV- Regular rhythm, no murmurs  Resp- CTA to bases, no wheezes  Abdomen- Soft, non distended, non tender, no masses  Breast left-  Bilaterally symmetrical, no masses, non tender, no nipple discharge  Breast right- Bilaterally symmetrical, no masses, non tender, no nipple discharge  External genitalia- Normal female, no lesions, no atrophy  Urethra/meatus- Normal, no masses, non tender, no prolapse  Bladder- Normal, no masses, non tender, no prolapse  Vagina- Normal, no atrophy, no lesions, no discharge, no prolapse  Cvx- Normal, no lesions, no discharge, No cervical motion tenderness  Uterus- Normal size, shape & consistency.  Non tender, mobile.  Adnexa- No mass, non tender  Anus/Rectum/Perineum- Not performed  Ext- No edema, no cyanosis    Skin- No lesions, no rashes, no acanthosis nigricans    ASSESSMENT and PLAN:    Diagnoses and all orders for this visit:    1. Well woman exam (Primary)  -     IgP, Aptima HPV    2. Encounter for surveillance of contraceptive pills  -     Norethin-Eth Estrad-Fe Biphas (Lo Loestrin Fe) 1 MG-10 MCG / 10 MCG tablet; Take 1 tablet by mouth Daily.  Dispense: 84 tablet; Refill: 4    3. At high risk for breast cancer  -     MRI Breast Bilateral Diagnostic W WO Contrast; Future        Preventative:   BREAST HEALTH- Monthly self breast exam importance and how to reviewed.  MMG and/or MRI (prn) reviewed per society guidelines and her individual history. Screening Imaging: Updated today, supplemental breast cancer screening due to high risk breast cancer- fam hx  CERVICAL CANCER Screening- Reviewed current ASCCP guidelines for screening w and wo cotest HPV, age specific.  Screen: Updated today  COLON CANCER Screening- Reviewed current medical society guidelines and options.  Screen:  Not medically needed  BONE HEALTH- Reviewed current medical society guidelines and options for testing, calcium and vit D intake.  Weight bearing exercise.  DEXA: Not medically needed  VACCINATIONS Recommended: COVID and booster PRN, Flu annually  Importance discussed, risk being unvaccinated reviewed.  Questions answered  Previously screened increased risk  Smoking status- NON SMOKER  Follow up PCP/Specialist PMHx and Labs  TRACK MENSES, RTO if <q21d, >7d long, heavy or painful.    All BIRTH CONTROL options R/B/A/SE/E of each reviewed in detail.  CHC/hormone use risk THROMBOEMBOLIC RISK reviewed.     SAFE SEX/condoms importance reviewed.    Pt desires permanent sterilization/endometrial ablation, discussed case with Dr. Doty, schedule with Dr. Doty for consultation, was counseled regarding need for EB prior to ablation, to discuss with Dr. Doty   She understands the importance of having any ordered tests to be performed in a timely fashion.  The risks of not performing them include, but are not limited to, advanced cancer stages, bone loss from osteoporosis and/or subsequent increase in morbidity and/or mortality.  She is encouraged to review her results online and/or contact or office if she has questions.       Follow Up:  Return for with Dr. Doty discuss permanent sterilization +/- endometrial ablation.        Alysha Lux, APRN  06/20/2025

## 2025-06-18 ENCOUNTER — TRANSCRIBE ORDERS (OUTPATIENT)
Dept: ADMINISTRATIVE | Facility: HOSPITAL | Age: 40
End: 2025-06-18
Payer: COMMERCIAL

## 2025-06-18 DIAGNOSIS — Z12.31 SCREENING MAMMOGRAM FOR BREAST CANCER: Primary | ICD-10-CM

## 2025-06-20 ENCOUNTER — OFFICE VISIT (OUTPATIENT)
Dept: OBSTETRICS AND GYNECOLOGY | Age: 40
End: 2025-06-20
Payer: COMMERCIAL

## 2025-06-20 VITALS
BODY MASS INDEX: 22.85 KG/M2 | SYSTOLIC BLOOD PRESSURE: 99 MMHG | HEART RATE: 78 BPM | DIASTOLIC BLOOD PRESSURE: 74 MMHG | WEIGHT: 117 LBS

## 2025-06-20 DIAGNOSIS — Z01.419 WELL WOMAN EXAM: Primary | ICD-10-CM

## 2025-06-20 DIAGNOSIS — Z91.89 AT HIGH RISK FOR BREAST CANCER: ICD-10-CM

## 2025-06-20 DIAGNOSIS — Z30.41 ENCOUNTER FOR SURVEILLANCE OF CONTRACEPTIVE PILLS: ICD-10-CM

## 2025-06-20 PROCEDURE — 87624 HPV HI-RISK TYP POOLED RSLT: CPT | Performed by: NURSE PRACTITIONER

## 2025-06-20 PROCEDURE — G0123 SCREEN CERV/VAG THIN LAYER: HCPCS | Performed by: NURSE PRACTITIONER

## 2025-06-20 RX ORDER — NORETHINDRONE ACETATE AND ETHINYL ESTRADIOL, ETHINYL ESTRADIOL AND FERROUS FUMARATE 1MG-10(24)
1 KIT ORAL DAILY
Qty: 84 TABLET | Refills: 4 | Status: SHIPPED | OUTPATIENT
Start: 2025-06-20

## 2025-06-25 LAB
CYTOLOGIST CVX/VAG CYTO: NORMAL
CYTOLOGY CVX/VAG DOC CYTO: NORMAL
CYTOLOGY CVX/VAG DOC THIN PREP: NORMAL
DX ICD CODE: NORMAL
HPV I/H RISK 4 DNA CVX QL PROBE+SIG AMP: NEGATIVE
Lab: NORMAL
OTHER STN SPEC: NORMAL
SERVICE CMNT-IMP: NORMAL
STAT OF ADQ CVX/VAG CYTO-IMP: NORMAL

## 2025-06-27 DIAGNOSIS — J30.9 ALLERGIC RHINITIS, UNSPECIFIED SEASONALITY, UNSPECIFIED TRIGGER: ICD-10-CM

## 2025-06-27 DIAGNOSIS — J34.3 HYPERTROPHY OF INFERIOR NASAL TURBINATE: ICD-10-CM

## 2025-06-27 RX ORDER — MONTELUKAST SODIUM 10 MG/1
10 TABLET ORAL DAILY
Qty: 30 TABLET | Refills: 3 | Status: SHIPPED | OUTPATIENT
Start: 2025-06-27

## 2025-07-20 DIAGNOSIS — R11.0 NAUSEA: ICD-10-CM

## 2025-07-21 RX ORDER — ONDANSETRON 4 MG/1
4 TABLET, FILM COATED ORAL EVERY 8 HOURS PRN
Qty: 30 TABLET | Refills: 3 | Status: SHIPPED | OUTPATIENT
Start: 2025-07-21

## 2025-07-22 ENCOUNTER — HOSPITAL ENCOUNTER (OUTPATIENT)
Dept: MAMMOGRAPHY | Facility: HOSPITAL | Age: 40
Discharge: HOME | End: 2025-07-22
Admitting: NURSE PRACTITIONER
Payer: COMMERCIAL

## 2025-07-22 DIAGNOSIS — Z12.31 SCREENING MAMMOGRAM FOR BREAST CANCER: ICD-10-CM

## 2025-07-22 PROCEDURE — 77067 SCR MAMMO BI INCL CAD: CPT

## 2025-07-22 PROCEDURE — 77063 BREAST TOMOSYNTHESIS BI: CPT

## 2025-07-27 DIAGNOSIS — J30.9 ALLERGIC RHINITIS, UNSPECIFIED SEASONALITY, UNSPECIFIED TRIGGER: ICD-10-CM

## 2025-07-27 DIAGNOSIS — J34.3 HYPERTROPHY OF INFERIOR NASAL TURBINATE: ICD-10-CM

## 2025-07-28 RX ORDER — FLUTICASONE PROPIONATE 50 MCG
2 SPRAY, SUSPENSION (ML) NASAL DAILY
Qty: 48 G | Refills: 11 | Status: SHIPPED | OUTPATIENT
Start: 2025-07-28

## 2025-08-07 ENCOUNTER — TRANSCRIBE ORDERS (OUTPATIENT)
Dept: ADMINISTRATIVE | Facility: HOSPITAL | Age: 40
End: 2025-08-07
Payer: COMMERCIAL

## 2025-08-07 ENCOUNTER — LAB (OUTPATIENT)
Dept: LAB | Facility: HOSPITAL | Age: 40
End: 2025-08-07
Payer: COMMERCIAL

## 2025-08-07 DIAGNOSIS — Z79.899 ENCOUNTER FOR LONG-TERM (CURRENT) USE OF MEDICATIONS: ICD-10-CM

## 2025-08-07 DIAGNOSIS — C92.11 CHRONIC MYELOID LEUKEMIA, BCR/ABL-POSITIVE, IN REMISSION: ICD-10-CM

## 2025-08-07 DIAGNOSIS — R00.2 PALPITATIONS: Primary | ICD-10-CM

## 2025-08-07 DIAGNOSIS — Z00.00 ROUTINE MEDICAL EXAM: Primary | ICD-10-CM

## 2025-08-07 DIAGNOSIS — R00.2 PALPITATION: ICD-10-CM

## 2025-08-07 DIAGNOSIS — Z00.00 ROUTINE MEDICAL EXAM: ICD-10-CM

## 2025-08-07 LAB
25(OH)D3 SERPL-MCNC: 54 NG/ML (ref 30–100)
ALBUMIN SERPL-MCNC: 4.4 G/DL (ref 3.5–5.2)
ALBUMIN/GLOB SERPL: 1.7 G/DL
ALP SERPL-CCNC: 48 U/L (ref 39–117)
ALT SERPL W P-5'-P-CCNC: 10 U/L (ref 1–33)
ANION GAP SERPL CALCULATED.3IONS-SCNC: 11.3 MMOL/L (ref 5–15)
AST SERPL-CCNC: 18 U/L (ref 1–32)
BASOPHILS # BLD AUTO: 0.02 10*3/MM3 (ref 0–0.2)
BASOPHILS NFR BLD AUTO: 0.2 % (ref 0–1.5)
BILIRUB SERPL-MCNC: 0.5 MG/DL (ref 0–1.2)
BUN SERPL-MCNC: 10 MG/DL (ref 6–20)
BUN/CREAT SERPL: 11.9 (ref 7–25)
CALCIUM SPEC-SCNC: 9.5 MG/DL (ref 8.6–10.5)
CHLORIDE SERPL-SCNC: 105 MMOL/L (ref 98–107)
CHOLEST SERPL-MCNC: 165 MG/DL (ref 0–200)
CO2 SERPL-SCNC: 22.7 MMOL/L (ref 22–29)
CREAT SERPL-MCNC: 0.84 MG/DL (ref 0.57–1)
DEPRECATED RDW RBC AUTO: 43.3 FL (ref 37–54)
EGFRCR SERPLBLD CKD-EPI 2021: 90.8 ML/MIN/1.73
EOSINOPHIL # BLD AUTO: 0.04 10*3/MM3 (ref 0–0.4)
EOSINOPHIL NFR BLD AUTO: 0.5 % (ref 0.3–6.2)
ERYTHROCYTE [DISTWIDTH] IN BLOOD BY AUTOMATED COUNT: 12.1 % (ref 12.3–15.4)
FERRITIN SERPL-MCNC: 145 NG/ML (ref 13–150)
FOLATE SERPL-MCNC: 15.6 NG/ML (ref 4.78–24.2)
GLOBULIN UR ELPH-MCNC: 2.6 GM/DL
GLUCOSE SERPL-MCNC: 81 MG/DL (ref 65–99)
HBA1C MFR BLD: 4.7 % (ref 4.8–5.6)
HCT VFR BLD AUTO: 39.2 % (ref 34–46.6)
HDLC SERPL-MCNC: 48 MG/DL (ref 40–60)
HGB BLD-MCNC: 13.6 G/DL (ref 12–15.9)
IMM GRANULOCYTES # BLD AUTO: 0.02 10*3/MM3 (ref 0–0.05)
IMM GRANULOCYTES NFR BLD AUTO: 0.2 % (ref 0–0.5)
IRON 24H UR-MRATE: 90 MCG/DL (ref 37–145)
IRON SATN MFR SERPL: 22 % (ref 20–50)
LDLC SERPL CALC-MCNC: 104 MG/DL (ref 0–100)
LDLC/HDLC SERPL: 2.16 {RATIO}
LYMPHOCYTES # BLD AUTO: 1.52 10*3/MM3 (ref 0.7–3.1)
LYMPHOCYTES NFR BLD AUTO: 18.6 % (ref 19.6–45.3)
MCH RBC QN AUTO: 34 PG (ref 26.6–33)
MCHC RBC AUTO-ENTMCNC: 34.7 G/DL (ref 31.5–35.7)
MCV RBC AUTO: 98 FL (ref 79–97)
MONOCYTES # BLD AUTO: 0.52 10*3/MM3 (ref 0.1–0.9)
MONOCYTES NFR BLD AUTO: 6.3 % (ref 5–12)
NEUTROPHILS NFR BLD AUTO: 6.07 10*3/MM3 (ref 1.7–7)
NEUTROPHILS NFR BLD AUTO: 74.2 % (ref 42.7–76)
NRBC BLD AUTO-RTO: 0 /100 WBC (ref 0–0.2)
PLATELET # BLD AUTO: 259 10*3/MM3 (ref 140–450)
PMV BLD AUTO: 10.5 FL (ref 6–12)
POTASSIUM SERPL-SCNC: 3.6 MMOL/L (ref 3.5–5.2)
PROT SERPL-MCNC: 7 G/DL (ref 6–8.5)
RBC # BLD AUTO: 4 10*6/MM3 (ref 3.77–5.28)
SODIUM SERPL-SCNC: 139 MMOL/L (ref 136–145)
T4 FREE SERPL-MCNC: 1.68 NG/DL (ref 0.92–1.68)
TIBC SERPL-MCNC: 401 MCG/DL (ref 298–536)
TRANSFERRIN SERPL-MCNC: 269 MG/DL (ref 200–360)
TRIGL SERPL-MCNC: 67 MG/DL (ref 0–150)
TSH SERPL DL<=0.05 MIU/L-ACNC: 1.03 UIU/ML (ref 0.27–4.2)
VIT B12 BLD-MCNC: 515 PG/ML (ref 211–946)
VLDLC SERPL-MCNC: 13 MG/DL (ref 5–40)
WBC NRBC COR # BLD AUTO: 8.19 10*3/MM3 (ref 3.4–10.8)

## 2025-08-07 PROCEDURE — 84466 ASSAY OF TRANSFERRIN: CPT

## 2025-08-07 PROCEDURE — 82607 VITAMIN B-12: CPT

## 2025-08-07 PROCEDURE — 80061 LIPID PANEL: CPT

## 2025-08-07 PROCEDURE — 84439 ASSAY OF FREE THYROXINE: CPT

## 2025-08-07 PROCEDURE — 83036 HEMOGLOBIN GLYCOSYLATED A1C: CPT

## 2025-08-07 PROCEDURE — 80050 GENERAL HEALTH PANEL: CPT

## 2025-08-07 PROCEDURE — 83540 ASSAY OF IRON: CPT

## 2025-08-07 PROCEDURE — 36415 COLL VENOUS BLD VENIPUNCTURE: CPT

## 2025-08-07 PROCEDURE — 82746 ASSAY OF FOLIC ACID SERUM: CPT

## 2025-08-07 PROCEDURE — 82306 VITAMIN D 25 HYDROXY: CPT

## 2025-08-07 PROCEDURE — 82728 ASSAY OF FERRITIN: CPT

## (undated) DEVICE — DRSNG SURG AQUACEL AG/ADVNTGE 9X15CM 3.5X6IN

## (undated) DEVICE — SUT ETHLN 4/0 FS2 18IN 662H

## (undated) DEVICE — STERILE POLYISOPRENE POWDER-FREE SURGICAL GLOVES: Brand: PROTEXIS

## (undated) DEVICE — GAUZE,SPONGE,4"X4",16PLY,STRL,LF,10/TRAY: Brand: MEDLINE

## (undated) DEVICE — NDL HYPO ECLPS SFTY 22G 1 1/2IN

## (undated) DEVICE — BLD OPTH BEAVER/MINIBLADE STR 180DEG DBL/BVL SS

## (undated) DEVICE — BNDG ESMARK 4IN 12FT LF STRL BLU

## (undated) DEVICE — ADHS LIQ MASTISOL 2/3ML

## (undated) DEVICE — GOWN,REINFORCE,POLY,SIRUS,BREATH SLV,XLG: Brand: MEDLINE

## (undated) DEVICE — GLV SURG SENSICARE SLT PF LF 7 STRL

## (undated) DEVICE — GLV SURG SENSICARE W/ALOE PF LF 7 STRL

## (undated) DEVICE — STERILE POLYISOPRENE POWDER-FREE SURGICAL GLOVES WITH EMOLLIENT COATING: Brand: PROTEXIS

## (undated) DEVICE — INTENDED FOR TISSUE SEPARATION, AND OTHER PROCEDURES THAT REQUIRE A SHARP SURGICAL BLADE TO PUNCTURE OR CUT.: Brand: BARD-PARKER ® CARBON RIB-BACK BLADES

## (undated) DEVICE — PENCL E/S SMOKEEVAC W/TELESCP CANN

## (undated) DEVICE — DISPOSABLE TOURNIQUET CUFF SINGLE BLADDER, SINGLE PORT AND QUICK CONNECT CONNECTOR: Brand: COLOR CUFF

## (undated) DEVICE — GLV SURG SENSICARE PI ORTHO SZ8 LF STRL

## (undated) DEVICE — BNDG ELAS ECON W/CLIP 4IN 5YD LF STRL

## (undated) DEVICE — SLV SCD KN/LEN ADJ EXPRSS BLENDED MD 1P/U

## (undated) DEVICE — STRIP CLS WND SUTURESTRIP/PLS 0.5X4IN TP1103

## (undated) DEVICE — EXTREMITY-LF: Brand: MEDLINE INDUSTRIES, INC.

## (undated) DEVICE — ELECTRD BLD EDGE COAT 3IN

## (undated) DEVICE — DRSNG GZ PETROLTM XEROFORM CURAD 1X8IN STRL

## (undated) DEVICE — CONTAINER,SPECIMEN,O.R.STRL,4.5OZ: Brand: MEDLINE

## (undated) DEVICE — UNDERCAST PADDING: Brand: DEROYAL

## (undated) DEVICE — MAJOR-LF: Brand: MEDLINE INDUSTRIES, INC.

## (undated) DEVICE — GLV SURG ULTRAFREE MAX LTX PF 8

## (undated) DEVICE — GOWN,REINFRCE,POLY,SIRUS,BREATH SLV,XXLG: Brand: MEDLINE